# Patient Record
Sex: FEMALE | Race: WHITE | NOT HISPANIC OR LATINO | Employment: OTHER | URBAN - METROPOLITAN AREA
[De-identification: names, ages, dates, MRNs, and addresses within clinical notes are randomized per-mention and may not be internally consistent; named-entity substitution may affect disease eponyms.]

---

## 2017-05-24 PROBLEM — D49.2 NEOPLASM OF UNSPECIFIED BEHAVIOR OF BONE, SOFT TISSUE, AND SKIN: Status: RESOLVED | Noted: 2017-05-10 | Resolved: 2017-05-24

## 2017-07-25 DIAGNOSIS — Z01.812 PRE-OPERATIVE LABORATORY EXAMINATION: ICD-10-CM

## 2017-07-25 DIAGNOSIS — Z01.810 PRE-OPERATIVE CARDIOVASCULAR EXAMINATION: ICD-10-CM

## 2017-07-25 LAB
ANION GAP SERPL CALC-SCNC: 8 MMOL/L (ref 0–11.9)
APPEARANCE UR: CLEAR
BACTERIA #/AREA URNS HPF: NEGATIVE /HPF
BASOPHILS # BLD AUTO: 0.7 % (ref 0–1.8)
BASOPHILS # BLD: 0.05 K/UL (ref 0–0.12)
BILIRUB UR QL STRIP.AUTO: NEGATIVE
BUN SERPL-MCNC: 14 MG/DL (ref 8–22)
CALCIUM SERPL-MCNC: 9.5 MG/DL (ref 8.5–10.5)
CHLORIDE SERPL-SCNC: 101 MMOL/L (ref 96–112)
CO2 SERPL-SCNC: 28 MMOL/L (ref 20–33)
COLOR UR: ABNORMAL
CREAT SERPL-MCNC: 1.03 MG/DL (ref 0.5–1.4)
CULTURE IF INDICATED INDCX: YES UA CULTURE
EKG IMPRESSION: NORMAL
EOSINOPHIL # BLD AUTO: 0.1 K/UL (ref 0–0.51)
EOSINOPHIL NFR BLD: 1.4 % (ref 0–6.9)
EPI CELLS #/AREA URNS HPF: NEGATIVE /HPF
ERYTHROCYTE [DISTWIDTH] IN BLOOD BY AUTOMATED COUNT: 49.3 FL (ref 35.9–50)
EST. AVERAGE GLUCOSE BLD GHB EST-MCNC: 171 MG/DL
GFR SERPL CREATININE-BSD FRML MDRD: 52 ML/MIN/1.73 M 2
GLUCOSE SERPL-MCNC: 189 MG/DL (ref 65–99)
GLUCOSE UR STRIP.AUTO-MCNC: NEGATIVE MG/DL
HBA1C MFR BLD: 7.6 % (ref 0–5.6)
HCT VFR BLD AUTO: 43.6 % (ref 37–47)
HGB BLD-MCNC: 13.7 G/DL (ref 12–16)
HIV 1+2 AB+HIV1 P24 AG SERPL QL IA: NON REACTIVE
HYALINE CASTS #/AREA URNS LPF: NORMAL /LPF
IMM GRANULOCYTES # BLD AUTO: 0.01 K/UL (ref 0–0.11)
IMM GRANULOCYTES NFR BLD AUTO: 0.1 % (ref 0–0.9)
KETONES UR STRIP.AUTO-MCNC: NEGATIVE MG/DL
LEUKOCYTE ESTERASE UR QL STRIP.AUTO: ABNORMAL
LYMPHOCYTES # BLD AUTO: 1.32 K/UL (ref 1–4.8)
LYMPHOCYTES NFR BLD: 18.7 % (ref 22–41)
MCH RBC QN AUTO: 27.6 PG (ref 27–33)
MCHC RBC AUTO-ENTMCNC: 31.4 G/DL (ref 33.6–35)
MCV RBC AUTO: 87.7 FL (ref 81.4–97.8)
MICRO URNS: ABNORMAL
MONOCYTES # BLD AUTO: 0.46 K/UL (ref 0–0.85)
MONOCYTES NFR BLD AUTO: 6.5 % (ref 0–13.4)
NEUTROPHILS # BLD AUTO: 5.13 K/UL (ref 2–7.15)
NEUTROPHILS NFR BLD: 72.6 % (ref 44–72)
NITRITE UR QL STRIP.AUTO: NEGATIVE
NRBC # BLD AUTO: 0 K/UL
NRBC BLD AUTO-RTO: 0 /100 WBC
PH UR STRIP.AUTO: 7 [PH]
PLATELET # BLD AUTO: 309 K/UL (ref 164–446)
PMV BLD AUTO: 10.3 FL (ref 9–12.9)
POTASSIUM SERPL-SCNC: 4.3 MMOL/L (ref 3.6–5.5)
PROT UR QL STRIP: NEGATIVE MG/DL
RBC # BLD AUTO: 4.97 M/UL (ref 4.2–5.4)
RBC # URNS HPF: NORMAL /HPF
RBC UR QL AUTO: NEGATIVE
SCCMEC + MECA PNL NOSE NAA+PROBE: NEGATIVE
SCCMEC + MECA PNL NOSE NAA+PROBE: POSITIVE
SODIUM SERPL-SCNC: 137 MMOL/L (ref 135–145)
SP GR UR STRIP.AUTO: 1.01
UROBILINOGEN UR STRIP.AUTO-MCNC: 0.2 MG/DL
WBC # BLD AUTO: 7.1 K/UL (ref 4.8–10.8)
WBC #/AREA URNS HPF: NORMAL /HPF

## 2017-07-25 PROCEDURE — 83036 HEMOGLOBIN GLYCOSYLATED A1C: CPT

## 2017-07-25 PROCEDURE — 85025 COMPLETE CBC W/AUTO DIFF WBC: CPT

## 2017-07-25 PROCEDURE — 87389 HIV-1 AG W/HIV-1&-2 AB AG IA: CPT

## 2017-07-25 PROCEDURE — 87086 URINE CULTURE/COLONY COUNT: CPT

## 2017-07-25 PROCEDURE — 81001 URINALYSIS AUTO W/SCOPE: CPT

## 2017-07-25 PROCEDURE — 87640 STAPH A DNA AMP PROBE: CPT

## 2017-07-25 PROCEDURE — 87641 MR-STAPH DNA AMP PROBE: CPT

## 2017-07-25 PROCEDURE — 93005 ELECTROCARDIOGRAM TRACING: CPT

## 2017-07-25 PROCEDURE — 93010 ELECTROCARDIOGRAM REPORT: CPT | Performed by: INTERNAL MEDICINE

## 2017-07-25 PROCEDURE — 36415 COLL VENOUS BLD VENIPUNCTURE: CPT

## 2017-07-25 PROCEDURE — 80048 BASIC METABOLIC PNL TOTAL CA: CPT

## 2017-07-25 RX ORDER — LOSARTAN POTASSIUM 100 MG/1
100 TABLET ORAL DAILY
COMMUNITY

## 2017-07-25 RX ORDER — LEVOTHYROXINE SODIUM 0.03 MG/1
25 TABLET ORAL
COMMUNITY

## 2017-07-25 RX ORDER — AMLODIPINE BESYLATE 10 MG/1
10 TABLET ORAL DAILY
COMMUNITY

## 2017-07-25 RX ORDER — OXYCODONE HCL 10 MG/1
10 TABLET, FILM COATED, EXTENDED RELEASE ORAL 4 TIMES DAILY PRN
COMMUNITY

## 2017-07-25 RX ORDER — SIMVASTATIN 20 MG
20 TABLET ORAL NIGHTLY
COMMUNITY

## 2017-07-25 RX ORDER — CHLORAL HYDRATE 500 MG
1000 CAPSULE ORAL DAILY
Status: ON HOLD | COMMUNITY
End: 2017-07-27

## 2017-07-27 ENCOUNTER — APPOINTMENT (OUTPATIENT)
Dept: RADIOLOGY | Facility: MEDICAL CENTER | Age: 77
DRG: 468 | End: 2017-07-27
Attending: ORTHOPAEDIC SURGERY
Payer: MEDICARE

## 2017-07-27 ENCOUNTER — HOSPITAL ENCOUNTER (INPATIENT)
Facility: MEDICAL CENTER | Age: 77
LOS: 1 days | DRG: 468 | End: 2017-07-28
Attending: ORTHOPAEDIC SURGERY | Admitting: ORTHOPAEDIC SURGERY
Payer: MEDICARE

## 2017-07-27 PROBLEM — Z96.652 PRESENCE OF LEFT ARTIFICIAL KNEE JOINT: Status: ACTIVE | Noted: 2017-07-27

## 2017-07-27 LAB
BACTERIA UR CULT: NORMAL
GLUCOSE BLD-MCNC: 117 MG/DL (ref 65–99)
GLUCOSE BLD-MCNC: 195 MG/DL (ref 65–99)
GLUCOSE BLD-MCNC: 72 MG/DL (ref 65–99)
SIGNIFICANT IND 70042: NORMAL
SITE SITE: NORMAL
SOURCE SOURCE: NORMAL

## 2017-07-27 PROCEDURE — 82962 GLUCOSE BLOOD TEST: CPT

## 2017-07-27 PROCEDURE — 700111 HCHG RX REV CODE 636 W/ 250 OVERRIDE (IP)

## 2017-07-27 PROCEDURE — 700102 HCHG RX REV CODE 250 W/ 637 OVERRIDE(OP)

## 2017-07-27 PROCEDURE — 160036 HCHG PACU - EA ADDL 30 MINS PHASE I: Performed by: ORTHOPAEDIC SURGERY

## 2017-07-27 PROCEDURE — 700111 HCHG RX REV CODE 636 W/ 250 OVERRIDE (IP): Performed by: ORTHOPAEDIC SURGERY

## 2017-07-27 PROCEDURE — 700112 HCHG RX REV CODE 229: Performed by: ORTHOPAEDIC SURGERY

## 2017-07-27 PROCEDURE — 500002 HCHG ADHESIVE, DERMABOND: Performed by: ORTHOPAEDIC SURGERY

## 2017-07-27 PROCEDURE — 700101 HCHG RX REV CODE 250: Performed by: ORTHOPAEDIC SURGERY

## 2017-07-27 PROCEDURE — 73560 X-RAY EXAM OF KNEE 1 OR 2: CPT | Mod: LT

## 2017-07-27 PROCEDURE — 501486 HCHG STRYKER IRRIG SET HC W/TUBING: Performed by: ORTHOPAEDIC SURGERY

## 2017-07-27 PROCEDURE — A9270 NON-COVERED ITEM OR SERVICE: HCPCS | Performed by: ORTHOPAEDIC SURGERY

## 2017-07-27 PROCEDURE — A9270 NON-COVERED ITEM OR SERVICE: HCPCS

## 2017-07-27 PROCEDURE — 0QBF0ZZ EXCISION OF LEFT PATELLA, OPEN APPROACH: ICD-10-PCS | Performed by: ORTHOPAEDIC SURGERY

## 2017-07-27 PROCEDURE — 160022 HCHG BLOCK: Performed by: ORTHOPAEDIC SURGERY

## 2017-07-27 PROCEDURE — 700105 HCHG RX REV CODE 258: Performed by: ORTHOPAEDIC SURGERY

## 2017-07-27 PROCEDURE — 160002 HCHG RECOVERY MINUTES (STAT): Performed by: ORTHOPAEDIC SURGERY

## 2017-07-27 PROCEDURE — 0SPD0JZ REMOVAL OF SYNTHETIC SUBSTITUTE FROM LEFT KNEE JOINT, OPEN APPROACH: ICD-10-PCS | Performed by: ORTHOPAEDIC SURGERY

## 2017-07-27 PROCEDURE — 501838 HCHG SUTURE GENERAL: Performed by: ORTHOPAEDIC SURGERY

## 2017-07-27 PROCEDURE — 160035 HCHG PACU - 1ST 60 MINS PHASE I: Performed by: ORTHOPAEDIC SURGERY

## 2017-07-27 PROCEDURE — 500096 HCHG BONE CEMENT, SIMPLEX ANTIBIOTIC: Performed by: ORTHOPAEDIC SURGERY

## 2017-07-27 PROCEDURE — 700105 HCHG RX REV CODE 258

## 2017-07-27 PROCEDURE — 160009 HCHG ANES TIME/MIN: Performed by: ORTHOPAEDIC SURGERY

## 2017-07-27 PROCEDURE — 700102 HCHG RX REV CODE 250 W/ 637 OVERRIDE(OP): Performed by: ORTHOPAEDIC SURGERY

## 2017-07-27 PROCEDURE — 160048 HCHG OR STATISTICAL LEVEL 1-5: Performed by: ORTHOPAEDIC SURGERY

## 2017-07-27 PROCEDURE — 501480 HCHG STOCKINETTE: Performed by: ORTHOPAEDIC SURGERY

## 2017-07-27 PROCEDURE — 500088 HCHG BLADE, SAGITTAL: Performed by: ORTHOPAEDIC SURGERY

## 2017-07-27 PROCEDURE — 700101 HCHG RX REV CODE 250

## 2017-07-27 PROCEDURE — 502000 HCHG MISC OR IMPLANTS RC 0278: Performed by: ORTHOPAEDIC SURGERY

## 2017-07-27 PROCEDURE — 500054 HCHG BANDAGE, ELASTIC 6: Performed by: ORTHOPAEDIC SURGERY

## 2017-07-27 PROCEDURE — 500811 HCHG LENS/HOOD FOR SPACESUIT: Performed by: ORTHOPAEDIC SURGERY

## 2017-07-27 PROCEDURE — 0SRD0J9 REPLACEMENT OF LEFT KNEE JOINT WITH SYNTHETIC SUBSTITUTE, CEMENTED, OPEN APPROACH: ICD-10-PCS | Performed by: ORTHOPAEDIC SURGERY

## 2017-07-27 PROCEDURE — 502240 HCHG MISC OR SUPPLY RC 0272: Performed by: ORTHOPAEDIC SURGERY

## 2017-07-27 PROCEDURE — 502579 HCHG PACK, TOTAL KNEE: Performed by: ORTHOPAEDIC SURGERY

## 2017-07-27 PROCEDURE — 770006 HCHG ROOM/CARE - MED/SURG/GYN SEMI*

## 2017-07-27 PROCEDURE — 160041 HCHG SURGERY MINUTES - EA ADDL 1 MIN LEVEL 4: Performed by: ORTHOPAEDIC SURGERY

## 2017-07-27 PROCEDURE — 501487 HCHG STRYKER TIP: Performed by: ORTHOPAEDIC SURGERY

## 2017-07-27 PROCEDURE — 160029 HCHG SURGERY MINUTES - 1ST 30 MINS LEVEL 4: Performed by: ORTHOPAEDIC SURGERY

## 2017-07-27 PROCEDURE — 500093 HCHG BONE CEMENT MIXER: Performed by: ORTHOPAEDIC SURGERY

## 2017-07-27 DEVICE — IMPLANTABLE DEVICE: Type: IMPLANTABLE DEVICE | Status: FUNCTIONAL

## 2017-07-27 DEVICE — IMPLANT GII OVAL RESURFACING PAT 29MM (1EA): Type: IMPLANTABLE DEVICE | Status: FUNCTIONAL

## 2017-07-27 DEVICE — BONE CEMENT SIMPLEX ANTIBIO - (10/PK): Type: IMPLANTABLE DEVICE | Status: FUNCTIONAL

## 2017-07-27 RX ORDER — FUROSEMIDE 40 MG/1
40 TABLET ORAL
Status: DISCONTINUED | OUTPATIENT
Start: 2017-07-27 | End: 2017-07-28 | Stop reason: HOSPADM

## 2017-07-27 RX ORDER — SCOLOPAMINE TRANSDERMAL SYSTEM 1 MG/1
1 PATCH, EXTENDED RELEASE TRANSDERMAL
Status: DISCONTINUED | OUTPATIENT
Start: 2017-07-27 | End: 2017-07-28 | Stop reason: HOSPADM

## 2017-07-27 RX ORDER — OXYCODONE HYDROCHLORIDE 10 MG/1
10-15 TABLET ORAL
Status: DISCONTINUED | OUTPATIENT
Start: 2017-07-27 | End: 2017-07-27

## 2017-07-27 RX ORDER — INSULIN GLARGINE 100 [IU]/ML
58 INJECTION, SOLUTION SUBCUTANEOUS EVERY EVENING
COMMUNITY

## 2017-07-27 RX ORDER — ESOMEPRAZOLE MAGNESIUM 40 MG/1
40 CAPSULE, DELAYED RELEASE ORAL DAILY
Status: DISCONTINUED | OUTPATIENT
Start: 2017-07-27 | End: 2017-07-27

## 2017-07-27 RX ORDER — AMOXICILLIN 250 MG
1 CAPSULE ORAL NIGHTLY
Status: DISCONTINUED | OUTPATIENT
Start: 2017-07-27 | End: 2017-07-28 | Stop reason: HOSPADM

## 2017-07-27 RX ORDER — ACETAMINOPHEN 500 MG
1000 TABLET ORAL EVERY 6 HOURS
Status: DISCONTINUED | OUTPATIENT
Start: 2017-07-27 | End: 2017-07-28 | Stop reason: HOSPADM

## 2017-07-27 RX ORDER — ONDANSETRON 2 MG/ML
4 INJECTION INTRAMUSCULAR; INTRAVENOUS EVERY 4 HOURS PRN
Status: DISCONTINUED | OUTPATIENT
Start: 2017-07-27 | End: 2017-07-28 | Stop reason: HOSPADM

## 2017-07-27 RX ORDER — LOSARTAN POTASSIUM 50 MG/1
100 TABLET ORAL DAILY
Status: DISCONTINUED | OUTPATIENT
Start: 2017-07-27 | End: 2017-07-28 | Stop reason: HOSPADM

## 2017-07-27 RX ORDER — DEXAMETHASONE SODIUM PHOSPHATE 4 MG/ML
4 INJECTION, SOLUTION INTRA-ARTICULAR; INTRALESIONAL; INTRAMUSCULAR; INTRAVENOUS; SOFT TISSUE
Status: DISCONTINUED | OUTPATIENT
Start: 2017-07-27 | End: 2017-07-28 | Stop reason: HOSPADM

## 2017-07-27 RX ORDER — OXYBUTYNIN CHLORIDE 5 MG/1
5 TABLET, EXTENDED RELEASE ORAL EVERY EVENING
Status: DISCONTINUED | OUTPATIENT
Start: 2017-07-28 | End: 2017-07-28 | Stop reason: HOSPADM

## 2017-07-27 RX ORDER — GABAPENTIN 100 MG/1
100 CAPSULE ORAL 4 TIMES DAILY
Status: DISCONTINUED | OUTPATIENT
Start: 2017-07-27 | End: 2017-07-28 | Stop reason: HOSPADM

## 2017-07-27 RX ORDER — GUAIFENESIN 600 MG/1
600 TABLET, EXTENDED RELEASE ORAL 2 TIMES DAILY
Status: DISCONTINUED | OUTPATIENT
Start: 2017-07-27 | End: 2017-07-28 | Stop reason: HOSPADM

## 2017-07-27 RX ORDER — CELECOXIB 200 MG/1
200 CAPSULE ORAL DAILY
Status: DISCONTINUED | OUTPATIENT
Start: 2017-07-29 | End: 2017-07-28 | Stop reason: HOSPADM

## 2017-07-27 RX ORDER — AMOXICILLIN 250 MG
1 CAPSULE ORAL
Status: DISCONTINUED | OUTPATIENT
Start: 2017-07-27 | End: 2017-07-28 | Stop reason: HOSPADM

## 2017-07-27 RX ORDER — BISACODYL 10 MG
10 SUPPOSITORY, RECTAL RECTAL
Status: DISCONTINUED | OUTPATIENT
Start: 2017-07-27 | End: 2017-07-28 | Stop reason: HOSPADM

## 2017-07-27 RX ORDER — INSULIN GLARGINE 100 [IU]/ML
58 INJECTION, SOLUTION SUBCUTANEOUS EVERY EVENING
Status: DISCONTINUED | OUTPATIENT
Start: 2017-07-27 | End: 2017-07-28 | Stop reason: HOSPADM

## 2017-07-27 RX ORDER — OXYCODONE HCL 5 MG/5 ML
SOLUTION, ORAL ORAL
Status: COMPLETED
Start: 2017-07-27 | End: 2017-07-27

## 2017-07-27 RX ORDER — HALOPERIDOL 5 MG/ML
1 INJECTION INTRAMUSCULAR EVERY 6 HOURS PRN
Status: DISCONTINUED | OUTPATIENT
Start: 2017-07-27 | End: 2017-07-28 | Stop reason: HOSPADM

## 2017-07-27 RX ORDER — POLYETHYLENE GLYCOL 3350 17 G/17G
1 POWDER, FOR SOLUTION ORAL 2 TIMES DAILY PRN
Status: DISCONTINUED | OUTPATIENT
Start: 2017-07-27 | End: 2017-07-28 | Stop reason: HOSPADM

## 2017-07-27 RX ORDER — GABAPENTIN 100 MG/1
100 CAPSULE ORAL 4 TIMES DAILY
COMMUNITY

## 2017-07-27 RX ORDER — CEFAZOLIN SODIUM 2 G/100ML
2 INJECTION, SOLUTION INTRAVENOUS EVERY 8 HOURS
Status: COMPLETED | OUTPATIENT
Start: 2017-07-27 | End: 2017-07-28

## 2017-07-27 RX ORDER — OXYCODONE HCL 10 MG/1
TABLET, FILM COATED, EXTENDED RELEASE ORAL
Status: DISPENSED
Start: 2017-07-27 | End: 2017-07-28

## 2017-07-27 RX ORDER — SODIUM CHLORIDE 9 MG/ML
INJECTION, SOLUTION INTRAVENOUS
Status: COMPLETED
Start: 2017-07-27 | End: 2017-07-27

## 2017-07-27 RX ORDER — OXYCODONE HCL 10 MG/1
10 TABLET, FILM COATED, EXTENDED RELEASE ORAL 4 TIMES DAILY PRN
Status: DISCONTINUED | OUTPATIENT
Start: 2017-07-27 | End: 2017-07-28 | Stop reason: HOSPADM

## 2017-07-27 RX ORDER — ACETAMINOPHEN 500 MG
TABLET ORAL
Status: DISPENSED
Start: 2017-07-27 | End: 2017-07-28

## 2017-07-27 RX ORDER — SODIUM CHLORIDE 9 MG/ML
INJECTION, SOLUTION INTRAVENOUS CONTINUOUS
Status: DISCONTINUED | OUTPATIENT
Start: 2017-07-27 | End: 2017-07-28 | Stop reason: HOSPADM

## 2017-07-27 RX ORDER — TAMSULOSIN HYDROCHLORIDE 0.4 MG/1
0.4 CAPSULE ORAL
Status: DISCONTINUED | OUTPATIENT
Start: 2017-07-27 | End: 2017-07-28 | Stop reason: HOSPADM

## 2017-07-27 RX ORDER — OXYCODONE HYDROCHLORIDE 5 MG/1
5 TABLET ORAL
Status: DISCONTINUED | OUTPATIENT
Start: 2017-07-27 | End: 2017-07-28 | Stop reason: HOSPADM

## 2017-07-27 RX ORDER — ACETAMINOPHEN 650 MG
TABLET, EXTENDED RELEASE ORAL
Status: DISCONTINUED | OUTPATIENT
Start: 2017-07-27 | End: 2017-07-27 | Stop reason: HOSPADM

## 2017-07-27 RX ORDER — HYDROMORPHONE HYDROCHLORIDE 2 MG/ML
INJECTION, SOLUTION INTRAMUSCULAR; INTRAVENOUS; SUBCUTANEOUS
Status: COMPLETED
Start: 2017-07-27 | End: 2017-07-27

## 2017-07-27 RX ORDER — KETOROLAC TROMETHAMINE 30 MG/ML
15 INJECTION, SOLUTION INTRAMUSCULAR; INTRAVENOUS EVERY 8 HOURS
Status: DISCONTINUED | OUTPATIENT
Start: 2017-07-27 | End: 2017-07-28 | Stop reason: HOSPADM

## 2017-07-27 RX ORDER — GUAIFENESIN 600 MG/1
600 TABLET, EXTENDED RELEASE ORAL 2 TIMES DAILY
COMMUNITY

## 2017-07-27 RX ORDER — SIMVASTATIN 20 MG
20 TABLET ORAL NIGHTLY
Status: DISCONTINUED | OUTPATIENT
Start: 2017-07-27 | End: 2017-07-28 | Stop reason: HOSPADM

## 2017-07-27 RX ORDER — CEFAZOLIN SODIUM 2 G/100ML
2 INJECTION, SOLUTION INTRAVENOUS
Status: DISCONTINUED | OUTPATIENT
Start: 2017-07-27 | End: 2017-07-28 | Stop reason: HOSPADM

## 2017-07-27 RX ORDER — LIDOCAINE HYDROCHLORIDE 10 MG/ML
0.5 INJECTION, SOLUTION INFILTRATION; PERINEURAL
Status: COMPLETED | OUTPATIENT
Start: 2017-07-27 | End: 2017-07-27

## 2017-07-27 RX ORDER — DOCUSATE SODIUM 100 MG/1
100 CAPSULE, LIQUID FILLED ORAL 2 TIMES DAILY
Status: DISCONTINUED | OUTPATIENT
Start: 2017-07-27 | End: 2017-07-28 | Stop reason: HOSPADM

## 2017-07-27 RX ORDER — LORAZEPAM 2 MG/ML
INJECTION INTRAMUSCULAR
Status: COMPLETED
Start: 2017-07-27 | End: 2017-07-27

## 2017-07-27 RX ORDER — MAGNESIUM HYDROXIDE 1200 MG/15ML
LIQUID ORAL
Status: DISCONTINUED | OUTPATIENT
Start: 2017-07-27 | End: 2017-07-27 | Stop reason: HOSPADM

## 2017-07-27 RX ORDER — GABAPENTIN 300 MG/1
CAPSULE ORAL
Status: DISPENSED
Start: 2017-07-27 | End: 2017-07-28

## 2017-07-27 RX ORDER — EPINEPHRINE 1 MG/ML(1)
AMPUL (ML) INJECTION
Status: DISCONTINUED | OUTPATIENT
Start: 2017-07-27 | End: 2017-07-27 | Stop reason: HOSPADM

## 2017-07-27 RX ORDER — TRAMADOL HYDROCHLORIDE 50 MG/1
50 TABLET ORAL EVERY 4 HOURS PRN
Status: DISCONTINUED | OUTPATIENT
Start: 2017-07-27 | End: 2017-07-28 | Stop reason: HOSPADM

## 2017-07-27 RX ORDER — AMLODIPINE BESYLATE 10 MG/1
10 TABLET ORAL DAILY
Status: DISCONTINUED | OUTPATIENT
Start: 2017-07-27 | End: 2017-07-28 | Stop reason: HOSPADM

## 2017-07-27 RX ORDER — OXYCODONE HYDROCHLORIDE 5 MG/1
15 TABLET ORAL
Status: DISCONTINUED | OUTPATIENT
Start: 2017-07-27 | End: 2017-07-28 | Stop reason: HOSPADM

## 2017-07-27 RX ORDER — DIPHENHYDRAMINE HCL 25 MG
25 TABLET ORAL EVERY 6 HOURS PRN
Status: DISCONTINUED | OUTPATIENT
Start: 2017-07-27 | End: 2017-07-28 | Stop reason: HOSPADM

## 2017-07-27 RX ORDER — SODIUM CHLORIDE 9 MG/ML
INJECTION, SOLUTION INTRAVENOUS CONTINUOUS
Status: DISCONTINUED | OUTPATIENT
Start: 2017-07-27 | End: 2017-07-27

## 2017-07-27 RX ORDER — OXYCODONE HYDROCHLORIDE 10 MG/1
10 TABLET ORAL
Status: DISCONTINUED | OUTPATIENT
Start: 2017-07-27 | End: 2017-07-28 | Stop reason: HOSPADM

## 2017-07-27 RX ORDER — DIPHENHYDRAMINE HYDROCHLORIDE 50 MG/ML
25 INJECTION INTRAMUSCULAR; INTRAVENOUS EVERY 6 HOURS PRN
Status: DISCONTINUED | OUTPATIENT
Start: 2017-07-27 | End: 2017-07-28 | Stop reason: HOSPADM

## 2017-07-27 RX ORDER — SOLIFENACIN SUCCINATE 5 MG/1
5 TABLET, FILM COATED ORAL DAILY
Status: DISCONTINUED | OUTPATIENT
Start: 2017-07-27 | End: 2017-07-27

## 2017-07-27 RX ORDER — LEVOTHYROXINE SODIUM 0.03 MG/1
25 TABLET ORAL
Status: DISCONTINUED | OUTPATIENT
Start: 2017-07-28 | End: 2017-07-28 | Stop reason: HOSPADM

## 2017-07-27 RX ORDER — LIDOCAINE HYDROCHLORIDE 10 MG/ML
INJECTION, SOLUTION INFILTRATION; PERINEURAL
Status: COMPLETED
Start: 2017-07-27 | End: 2017-07-27

## 2017-07-27 RX ORDER — OMEPRAZOLE 20 MG/1
20 CAPSULE, DELAYED RELEASE ORAL DAILY
Status: DISCONTINUED | OUTPATIENT
Start: 2017-07-28 | End: 2017-07-28 | Stop reason: HOSPADM

## 2017-07-27 RX ORDER — ENEMA 19; 7 G/133ML; G/133ML
1 ENEMA RECTAL
Status: DISCONTINUED | OUTPATIENT
Start: 2017-07-27 | End: 2017-07-28 | Stop reason: HOSPADM

## 2017-07-27 RX ORDER — ZOLPIDEM TARTRATE 5 MG/1
10 TABLET ORAL NIGHTLY PRN
Status: DISCONTINUED | OUTPATIENT
Start: 2017-07-27 | End: 2017-07-28 | Stop reason: HOSPADM

## 2017-07-27 RX ORDER — DEXTROSE MONOHYDRATE 25 G/50ML
25 INJECTION, SOLUTION INTRAVENOUS
Status: DISCONTINUED | OUTPATIENT
Start: 2017-07-27 | End: 2017-07-28 | Stop reason: HOSPADM

## 2017-07-27 RX ORDER — LIDOCAINE AND PRILOCAINE 25; 25 MG/G; MG/G
1 CREAM TOPICAL
Status: COMPLETED | OUTPATIENT
Start: 2017-07-27 | End: 2017-07-27

## 2017-07-27 RX ADMIN — SODIUM CHLORIDE: 9 INJECTION, SOLUTION INTRAVENOUS at 14:15

## 2017-07-27 RX ADMIN — FENTANYL CITRATE 50 MCG: 50 INJECTION, SOLUTION INTRAMUSCULAR; INTRAVENOUS at 18:40

## 2017-07-27 RX ADMIN — FENTANYL CITRATE 50 MCG: 50 INJECTION, SOLUTION INTRAMUSCULAR; INTRAVENOUS at 17:45

## 2017-07-27 RX ADMIN — OXYCODONE HYDROCHLORIDE 10 MG: 5 SOLUTION ORAL at 17:45

## 2017-07-27 RX ADMIN — LIDOCAINE HYDROCHLORIDE 0.5 ML: 10 INJECTION, SOLUTION INFILTRATION; PERINEURAL at 14:30

## 2017-07-27 RX ADMIN — GUAIFENESIN 600 MG: 600 TABLET, EXTENDED RELEASE ORAL at 21:59

## 2017-07-27 RX ADMIN — AMLODIPINE BESYLATE 10 MG: 10 TABLET ORAL at 21:59

## 2017-07-27 RX ADMIN — CEFAZOLIN SODIUM 2 G: 2 INJECTION, SOLUTION INTRAVENOUS at 22:01

## 2017-07-27 RX ADMIN — SODIUM CHLORIDE 500 ML: 9 INJECTION, SOLUTION INTRAVENOUS at 19:15

## 2017-07-27 RX ADMIN — OXYCODONE HYDROCHLORIDE 10 MG: 10 TABLET ORAL at 21:55

## 2017-07-27 RX ADMIN — LORAZEPAM 0.5 MG: 2 INJECTION INTRAMUSCULAR; INTRAVENOUS at 18:45

## 2017-07-27 RX ADMIN — SODIUM CHLORIDE 1000 MG: 9 INJECTION, SOLUTION INTRAVENOUS at 18:45

## 2017-07-27 RX ADMIN — ALBUTEROL SULFATE 2.5 MG: 2.5 SOLUTION RESPIRATORY (INHALATION) at 18:30

## 2017-07-27 RX ADMIN — LOSARTAN POTASSIUM 100 MG: 50 TABLET, FILM COATED ORAL at 21:59

## 2017-07-27 RX ADMIN — FENTANYL CITRATE 50 MCG: 50 INJECTION, SOLUTION INTRAMUSCULAR; INTRAVENOUS at 17:50

## 2017-07-27 RX ADMIN — SIMVASTATIN 20 MG: 20 TABLET, FILM COATED ORAL at 21:59

## 2017-07-27 RX ADMIN — ALBUTEROL SULFATE 2.5 MG: 2.5 SOLUTION RESPIRATORY (INHALATION) at 17:45

## 2017-07-27 RX ADMIN — HYDROMORPHONE HYDROCHLORIDE 0.5 MG: 2 INJECTION INTRAMUSCULAR; INTRAVENOUS; SUBCUTANEOUS at 18:30

## 2017-07-27 RX ADMIN — TAMSULOSIN HYDROCHLORIDE 0.4 MG: 0.4 CAPSULE ORAL at 21:59

## 2017-07-27 RX ADMIN — FENTANYL CITRATE 50 MCG: 50 INJECTION, SOLUTION INTRAMUSCULAR; INTRAVENOUS at 18:00

## 2017-07-27 RX ADMIN — ACETAMINOPHEN 1000 MG: 500 TABLET ORAL at 22:00

## 2017-07-27 RX ADMIN — SODIUM CHLORIDE: 9 INJECTION, SOLUTION INTRAVENOUS at 22:01

## 2017-07-27 RX ADMIN — KETOROLAC TROMETHAMINE 15 MG: 30 INJECTION, SOLUTION INTRAMUSCULAR at 22:00

## 2017-07-27 RX ADMIN — INSULIN GLARGINE 58 UNITS: 100 INJECTION, SOLUTION SUBCUTANEOUS at 22:24

## 2017-07-27 RX ADMIN — DOCUSATE SODIUM 100 MG: 100 CAPSULE ORAL at 21:59

## 2017-07-27 RX ADMIN — GABAPENTIN 100 MG: 100 CAPSULE ORAL at 21:59

## 2017-07-27 ASSESSMENT — LIFESTYLE VARIABLES
HOW MANY TIMES IN THE PAST YEAR HAVE YOU HAD 5 OR MORE DRINKS IN A DAY: 0
AVERAGE NUMBER OF DAYS PER WEEK YOU HAVE A DRINK CONTAINING ALCOHOL: 0
TOTAL SCORE: 0
EVER FELT BAD OR GUILTY ABOUT YOUR DRINKING: NO
ON A TYPICAL DAY WHEN YOU DRINK ALCOHOL HOW MANY DRINKS DO YOU HAVE: 1
TOTAL SCORE: 0
HAVE YOU EVER FELT YOU SHOULD CUT DOWN ON YOUR DRINKING: NO
ALCOHOL_USE: YES
TOTAL SCORE: 0
EVER HAD A DRINK FIRST THING IN THE MORNING TO STEADY YOUR NERVES TO GET RID OF A HANGOVER: NO
EVER_SMOKED: NEVER
CONSUMPTION TOTAL: NEGATIVE
HAVE PEOPLE ANNOYED YOU BY CRITICIZING YOUR DRINKING: NO

## 2017-07-27 ASSESSMENT — PAIN SCALES - GENERAL
PAINLEVEL_OUTOF10: 7
PAINLEVEL_OUTOF10: 6
PAINLEVEL_OUTOF10: 10
PAINLEVEL_OUTOF10: 8
PAINLEVEL_OUTOF10: 4
PAINLEVEL_OUTOF10: 7
PAINLEVEL_OUTOF10: 10
PAINLEVEL_OUTOF10: 5
PAINLEVEL_OUTOF10: 2

## 2017-07-27 ASSESSMENT — PATIENT HEALTH QUESTIONNAIRE - PHQ9
2. FEELING DOWN, DEPRESSED, IRRITABLE, OR HOPELESS: NOT AT ALL
SUM OF ALL RESPONSES TO PHQ QUESTIONS 1-9: 0
SUM OF ALL RESPONSES TO PHQ9 QUESTIONS 1 AND 2: 0
1. LITTLE INTEREST OR PLEASURE IN DOING THINGS: NOT AT ALL

## 2017-07-27 NOTE — IP AVS SNAPSHOT
" Home Care Instructions                                                                                                                  Name:Bobbi Rousseau  Medical Record Number:[id number]  CSN: [id number]    YOB: 1940   Age: 76 y.o.  Sex: female  HT:1.753 m (5' 9\") WT: 96.8 kg (213 lb 6.5 oz)          Admit Date: 7/27/2017     Discharge Date:   Today's Date: 7/28/2017  Attending Doctor:  Isidro Skinner M.D.                  Allergies:  Review of patient's allergies indicates no active allergies.            Discharge Instructions       Discharge Instructions    Discharged to home by car with relative. Discharged via wheelchair, hospital escort: Yes.  Special equipment needed: Not Applicable    Be sure to schedule a follow-up appointment with your primary care doctor or any specialists as instructed.     Discharge Plan:     *Follow up with Dr. Skinner at scheduled appointment  *Weight bearing as tolerated                     *Activity as tolerated  *Use assistive device for all activity  *Continue exercises provided by physical therapy  *Elevate leg as needed; Ok to have pillow under the ankle NO pillow under knee  *Ice as needed (20 minutes every 1-2 hours)  *Keep dressing in place until 08/1/17 postoperative day #5  *Starting 08/1/17 remove dressing and shower. Do not soak or scrub incision, after shower pat dry and leave open to air.  *No soaking of the incision; no baths, hot tubs, or swimming until cleared by doctor  Asprin 81mg twice a day for blood clot prevention        *Take medications as prescribed by doctor  *Call doctor’s office with any questions or concerns *Follow up with Dr. Skinner at scheduled appointment    Influenza Vaccine Indication: Not indicated: Previously immunized this influenza season and > 8 years of age    I understand that a diet low in cholesterol, fat, and sodium is recommended for good health. Unless I have been given specific instructions below for another " diet, I accept this instruction as my diet prescription.   Other diet: Diabetic diet    Special Instructions: Discharge instructions for the Orthopedic Patient    Follow up with Primary Care Physician within 2 weeks of discharge to home, regarding:  Review of medications and diagnostic testing.  Surveillance for medical complications.  Workup and treatment of osteoporosis, if appropriate.     -Is this a Joint Replacement patient? Yes Total Joint Knee Replacement Discharge Instructions    Pain  - The goal is to slowly wean off the prescription pain medicine.  - Ice can be used for pain control.  20 minutes at a time is recommended, and never directly against your skin or incision.  - Most patients are off the pain pills by 3 weeks; others may require a low level of pain medications for many months.  If your pain continues to be severe, follow up with your physician.  Infection    Knee joint infections; occur in fewer than 2% of patients. The most common causes of infection following total knee replacement surgery are from bacteria that enter the bloodstream during dental procedures, urinary tract infections, or skin infections. These bacteria can lodge around your knee replacement and cause an infection.  - Keep the incision as clean and dry as possible.  - Always wash your hands before touching your incision.  - Skin infections tend to develop around 7-10 days after surgery; most can be treated with oral antibiotics.  - Dental Care should be delayed for 3 months after surgery, your surgeon recommends taking a dose of antibiotics 1 hour prior to any dental procedure. After 2 years, most surgeons recommend antibiotics only before an extensive procedure.  Ask your surgeon what he recommends.  - Signs and symptoms of infection can include:  low grade fever, redness, pain, swelling and drainage from your incision.  Notify your surgeon immediately if you develop any of these symptoms.  Other instructions  - Bowel habits -  constipation is extremely common and is caused by a combination of anesthesia, lack of mobility and pain medicine.  Use stool softeners or laxatives if necessary. It is important not to ignore this problem, as bowel obstructions can be a serious complication after joint replacement surgery.  - Mood/Energy Level - Many patients experience a lack of energy and endurance for up to 2-3 months after surgery.  Some may also feel down and can even become depressed.  This is likely due to the postoperative anemia, change in activity level, lack of sleep, pain medicine and just the emotional reaction to the surgery itself that is a big disruption in a person’s life.  This usually passes.  If symptoms persist, follow up with your primary physician.  - Returning to work - Your surgeon will give you more specific instructions. Depending on the type of activities you perform, it may be 6 to 8 weeks before you return to work.   Generally, if you work a sedentary job requiring little standing or walking, most patients may return within 2-6 weeks.  Manual labor jobs involving walking, lifting and standing may take longer. Your surgeon’s office can provide a release to part-time or light duty work early on in your recovery and progress you to full duty as able.    - Driving - If your left knee was replaced and you have an automatic transmission, you may be able to begin driving in a week or so, provided you are no longer taking narcotic pain medication. If your right knee was replaced, avoid driving for 6 to 8 weeks. Remember that your reflexes may not be as sharp as before your surgery. Ask your surgeon for specific instructions.   - Avoiding falls - A fall during the first few weeks after surgery can damage your new knee and may result in a need for further surgery.   throw rugs and tack down loose carpeting.  Be aware of floor hazards such as pets, small objects or uneven surfaces.    - [employer] Metal Detectors - The  sensitivity of metal detectors varies and it is likely that your prosthesis will cause an alarm.  Inform the  of your artificial joint.  Diet  - Resume your normal diet as tolerated.  - It is important to achieve a healthy nutritional status by eating a well balanced diet on a regular basis.  - Your physician may recommend that you take iron and vitamin supplements.   - Continue to drink plenty of fluids.  Shower/Bathing  - You may shower as soon as you get home from the hospital unless otherwise instructed.  - Keep your incision out of water.  To keep the incision dry when showering, cover it with a plastic bag or plastic wrap.  - Pat incision dry if it gets wet.  Don’t rub.  - Do not submerge in a bath until staples are out and the incision is completely healed. (Approximately 6-8 weeks)  Dressing Change:  Procedure (if recommended by your physician)  - Wash hands.  - Open all new dressing change materials.  - Remove old dressing and discard.  - Inspect incision for redness, increase in clear drainage, yellow/green drainage, odor and surrounding skin hot to touch.  -  ABD (large gauze) pad or “island dressing” by one corner and lay over the incision.  Be careful not to touch the inside of the dressing that will lay over the incision.  - Secure in place as instructed (Ace wrap or tape).    Swelling/Bruising    - Swelling can last from 3-6 months.  - Elevate your leg higher than your heart while reclining.   The first week you are home you should elevate your leg an equal amount of time, as you are active.    - Anti-inflammatory pills can be taken once you have stopped the blood thinners.  - The swelling is usually worse after you go home since you are upright for longer periods of time.  - Bruising is common and can involve the entire leg including the thigh, calf and even foot. Bruising often does not appear until after you arrive home and it can be quite dramatic- purple, black, and green.   The bruising you can see is not usually concerning and will subside without any treatment.      Blood Clot Prevention  Blood clots in the legs and the less common, but frightening, clots that travel to the lungs are a real focus of our preventative. Most patients are at standard risk for them, but those patients who are at higher risk include people who have had previous clots, a family history of clotting, smoking, diabetes, obesity, advanced age, use of estrogen and a sedentary lifestyle.    - Signs of blood clots in legs - Swelling in thigh, calf or ankle that does not go down with elevation.  Pain, heat and tenderness in calf, back of calf or groin area.  NOTE: blood clots can occur in either leg.  - You have been receiving anticoagulant therapy (blood thinners) in the hospital and you may be instructed to continue at home depending on your risk factors.  - Your risk for developing a clot continues for up to 2-3 months after surgery.  You should avoid prolonged sitting and dehydration during that time (long air trips and car trips).  If you do take a trip during this time, please get up and move around every 1- 1.5 hours.  - If you are prescribed blood-thinning medication for home, follow instructions as directed. (Handouts provided if applicable).      Activity  Once home, you should continue to stay active. The key is to remember not to overdo it! While you can expect some good days and some bad days, you should notice a gradual improvement and a gradual increase in your endurance over the next 6 to 12 months. Exercise is a critical component of home care, particularly during the first few weeks after surgery.     - Normal activities of daily living You should be able to resume most within 3 to 6 weeks following surgery. Some pain with activity and at night is common for several weeks after surgery  -  Physical Therapy Exercises - Continue to do the exercises prescribed for at least two months after surgery.  Riding a stationary bicycle can help maintain muscle tone and keep your knee flexible. Try to achieve the maximum degree of bending and extension possible. (handout provided by Therapist).  - Sexual Activity -. Your surgeon can tell you when it safe to resume sexual activity.    - Sleeping Positions - You can safely sleep on your back, on either side, or on your stomach.   - Other Activities - Walk as much as you like, but remember that walking is no substitute for the exercises your doctor and physical therapist will prescribe. Lower impact activities are preferred.  If you have specific questions, consult your Surgeon.    When to Call the Doctor   Call the physician if:   - Fever over 100.5? F  - Increased pain, drainage, redness, odor or heat around the incision area  - Shaking chills  - Increased knee pain with activity and rest  - Increased pain in calf, tenderness or redness above or below the knee  - Increased swelling of calf, ankle, foot  - Sudden increased shortness of breath, sudden onset of chest pain, localized chest pain with coughing  - Incision opening  Or, if there are any questions or concerns about medications or care.       -Is this patient being discharged with medication to prevent blood clots?  Yes, Aspirin Aspirin, ASA oral tablets  What is this medicine?  ASPIRIN (AS pir in) is a pain reliever. It is used to treat mild pain and fever. This medicine is also used as directed by a doctor to prevent and to treat heart attacks, to prevent strokes, and to treat arthritis or inflammation.  This medicine may be used for other purposes; ask your health care provider or pharmacist if you have questions.  COMMON BRAND NAME(S): Aspir-Low, Aspir-Brittanie , Aspirtab , Juan Advanced Aspirin, Juan Aspirin Extra Strength, SplitSecnd Aspirin Plus, Juan Aspirin, Juan Genuine Aspirin, Juan Womens Aspirin , Bufferin Extra Strength, Bufferin Low Dose, Bufferin  What should I tell my health care provider before I take  this medicine?  They need to know if you have any of these conditions:  -anemia  -asthma  -bleeding problems  -child with chickenpox, the flu, or other viral infection  -diabetes  -gout  -if you frequently drink alcohol containing drinks  -kidney disease  -liver disease  -low level of vitamin K  -lupus  -smoke tobacco  -stomach ulcers or other problems  -an unusual or allergic reaction to aspirin, tartrazine dye, other medicines, dyes, or preservatives  -pregnant or trying to get pregnant  -breast-feeding  How should I use this medicine?  Take this medicine by mouth with a glass of water. Follow the directions on the package or prescription label. You can take this medicine with or without food. If it upsets your stomach, take it with food. Do not take your medicine more often than directed.  Talk to your pediatrician regarding the use of this medicine in children. While this drug may be prescribed for children as young as 12 years of age for selected conditions, precautions do apply. Children and teenagers should not use this medicine to treat chicken pox or flu symptoms unless directed by a doctor.  Patients over 65 years old may have a stronger reaction and need a smaller dose.  Overdosage: If you think you have taken too much of this medicine contact a poison control center or emergency room at once.  NOTE: This medicine is only for you. Do not share this medicine with others.  What if I miss a dose?  If you are taking this medicine on a regular schedule and miss a dose, take it as soon as you can. If it is almost time for your next dose, take only that dose. Do not take double or extra doses.  What may interact with this medicine?  Do not take this medicine with any of the following medications:  -cidofovir  -ketorolac  -probenecid  This medicine may also interact with the following medications:  -alcohol  -alendronate  -bismuth subsalicylate  -flavocoxid  -herbal supplements like feverfew, garlic, luciana,  ginkgo biloba, horse chestnut  -medicines for diabetes or glaucoma like acetazolamide, methazolamide  -medicines for gout  -medicines that treat or prevent blood clots like enoxaparin, heparin, ticlopidine, warfarin  -other aspirin and aspirin-like medicines  -NSAIDs, medicines for pain and inflammation, like ibuprofen or naproxen  -pemetrexed  -sulfinpyrazone  -varicella live vaccine  This list may not describe all possible interactions. Give your health care provider a list of all the medicines, herbs, non-prescription drugs, or dietary supplements you use. Also tell them if you smoke, drink alcohol, or use illegal drugs. Some items may interact with your medicine.  What should I watch for while using this medicine?  If you are treating yourself for pain, tell your doctor or health care professional if the pain lasts more than 10 days, if it gets worse, or if there is a new or different kind of pain. Tell your doctor if you see redness or swelling. Also, check with your doctor if you have a fever that lasts for more than 3 days. Only take this medicine to prevent heart attacks or blood clotting if prescribed by your doctor or health care professional.  Do not take aspirin or aspirin-like medicines with this medicine. Too much aspirin can be dangerous. Always read the labels carefully.  This medicine can irritate your stomach or cause bleeding problems. Do not smoke cigarettes or drink alcohol while taking this medicine. Do not lie down for 30 minutes after taking this medicine to prevent irritation to your throat.  If you are scheduled for any medical or dental procedure, tell your healthcare provider that you are taking this medicine. You may need to stop taking this medicine before the procedure.  What side effects may I notice from receiving this medicine?  Side effects that you should report to your doctor or health care professional as soon as possible:  -allergic reactions like skin rash, itching or hives,  swelling of the face, lips, or tongue  -black, tarry stools  -bloody, coffee ground-like vomit  -breathing problems  -changes in hearing, ringing in the ears  -confusion  -general ill feeling or flu-like symptoms  -pain on swallowing  -redness, blistering, peeling or loosening of the skin, including inside the mouth or nose  -trouble passing urine or change in the amount of urine  -unusual bleeding or bruising  -unusually weak or tired  -yellowing of the eyes or skin  Side effects that usually do not require medical attention (report to your doctor or health care professional if they continue or are bothersome):  -diarrhea or constipation  -nausea, vomiting  -stomach gas, heartburn  This list may not describe all possible side effects. Call your doctor for medical advice about side effects. You may report side effects to FDA at 6-633-FDA-7970.  Where should I keep my medicine?  Keep out of the reach of children.  Store at room temperature between 15 and 30 degrees C (59 and 86 degrees F). Protect from heat and moisture. Do not use this medicine if it has a strong vinegar smell. Throw away any unused medicine after the expiration date.  NOTE: This sheet is a summary. It may not cover all possible information. If you have questions about this medicine, talk to your doctor, pharmacist, or health care provider.  © 2014, [employer]. (3/10/2009 10:44:17 AM)      · Is patient discharged on Warfarin / Coumadin?   No     · Is patient Post Blood Transfusion?  No    Incision Care  An incision is when a surgeon cuts into your body. After surgery, the incision needs to be cared for properly to prevent infection.   HOW TO CARE FOR YOUR INCISION  · Take medicines only as directed by your health care provider.  · There are many different ways to close and cover an incision, including stitches, skin glue, and adhesive strips. Follow your health care provider's instructions on:  ¨ Incision care.  ¨ Bandage (dressing)  changes and removal.  ¨ Incision closure removal.  · Do not take baths, swim, or use a hot tub until your health care provider approves. You may shower as directed by your health care provider.  · Resume your normal diet and activities as directed.  · Use anti-itch medicine (such as an antihistamine) as directed by your health care provider. The incision may itch while it is healing. Do not pick or scratch at the incision.  · Drink enough fluid to keep your urine clear or pale yellow.  SEEK MEDICAL CARE IF:   · You have drainage, redness, swelling, or pain at your incision site.  · You have muscle aches, chills, or a general ill feeling.  · You notice a bad smell coming from the incision or dressing.  · Your incision edges separate after the sutures, staples, or skin adhesive strips have been removed.  · You have persistent nausea or vomiting.  · You have a fever.  · You are dizzy.  SEEK IMMEDIATE MEDICAL CARE IF:   · You have a rash.  · You faint.  · You have difficulty breathing.  MAKE SURE YOU:   · Understand these instructions.  · Will watch your condition.  · Will get help right away if you are not doing well or get worse.     This information is not intended to replace advice given to you by your health care provider. Make sure you discuss any questions you have with your health care provider.     Document Released: 07/07/2006 Document Revised: 01/08/2016 Document Reviewed: 02/11/2015  [employer] Interactive Patient Education ©2016 [employer]    Depression / Suicide Risk    As you are discharged from this [facility] facility, it is important to learn how to keep safe from harming yourself.    Recognize the warning signs:  · Abrupt changes in personality, positive or negative- including increase in energy   · Giving away possessions  · Change in eating patterns- significant weight changes-  positive or negative  · Change in sleeping patterns- unable to sleep or sleeping all the time   · Unwillingness or  inability to communicate  · Depression  · Unusual sadness, discouragement and loneliness  · Talk of wanting to die  · Neglect of personal appearance   · Rebelliousness- reckless behavior  · Withdrawal from people/activities they love  · Confusion- inability to concentrate     If you or a loved one observes any of these behaviors or has concerns about self-harm, here's what you can do:  · Talk about it- your feelings and reasons for harming yourself  · Remove any means that you might use to hurt yourself (examples: pills, rope, extension cords, firearm)  · Get professional help from the community (Mental Health, Substance Abuse, psychological counseling)  · Do not be alone:Call your Safe Contact- someone whom you trust who will be there for you.  · Call your local CRISIS HOTLINE 338-8980 or 711-371-7063  · Call your local Children's Mobile Crisis Response Team [place] (941) 741-7362 or www.[employer]and  · Call the toll free National Suicide Prevention Hotlines   · National Suicide Prevention Lifeline 469-108-MJUZ (6052)  · [employer] Line Network 800-SUICIDE (485-7680)        Follow-up Information     1. Follow up with Isidro Skinner M.D.. Schedule an appointment as soon as possible for a visit in 2 weeks.    Specialty:  Orthopaedics    Why:  Follow up    Contact information    [address]  [address]  [place] [address]  [phone]           Discharge Medication Instructions:    Below are the medications your physician expects you to take upon discharge:    Review all your home medications and newly ordered medications with your doctor and/or pharmacist. Follow medication instructions as directed by your doctor and/or pharmacist.    Please keep your medication list with you and share with your physician.               Medication List      CHANGE how you take these medications        Instructions    Morning Afternoon Evening Bedtime    aspirin EC 81 MG Tbec   What changed:    - how much to take  - when  to take this   Last time this was given:  81 mg on 7/28/2017  5:49 AM   Commonly known as:  ECOTRIN        Take 1 Tab by mouth 2 Times a Day.   Dose:  81 mg                          CONTINUE taking these medications        Instructions    Morning Afternoon Evening Bedtime    oxyCODONE CR 10 MG T12a   Last time this was given:  10 mg on 7/28/2017  8:22 AM   Commonly known as:  OXYCONTIN        Take 10 mg by mouth 4 times a day as needed.   Dose:  10 mg                          ASK your doctor about these medications        Instructions    Morning Afternoon Evening Bedtime    amlodipine 10 MG Tabs   Last time this was given:  10 mg on 7/27/2017  9:59 PM   Commonly known as:  NORVASC        Take 10 mg by mouth every day.   Dose:  10 mg                        CALCIUM PO        Take 1 Tab by mouth every day.   Dose:  1 Tab                        furosemide 40 MG Tabs   Commonly known as:  LASIX        Take 40 mg by mouth 1 time daily as needed.   Dose:  40 mg                        gabapentin 100 MG Caps   Last time this was given:  100 mg on 7/28/2017  8:23 AM   Commonly known as:  NEURONTIN        Take 100 mg by mouth 4 times a day.   Dose:  100 mg                        guaifenesin  MG Tb12   Last time this was given:  600 mg on 7/28/2017  8:23 AM   Commonly known as:  MUCINEX        Take 600 mg by mouth 2 Times a Day.   Dose:  600 mg                        insulin glargine 100 UNIT/ML Soln   Last time this was given:  58 Units on 7/27/2017 10:24 PM   Commonly known as:  LANTUS        Inject 58 Units as instructed every evening.   Dose:  58 Units                        losartan 100 MG Tabs   Last time this was given:  100 mg on 7/27/2017  9:59 PM   Commonly known as:  COZAAR        Take 100 mg by mouth every day.   Dose:  100 mg                        metformin 1000 MG tablet   Last time this was given:  1,000 mg on 7/28/2017  5:49 AM   Commonly known as:  GLUCOPHAGE        Take 1,000 mg by mouth 2 times daily,  before breakfast and dinner.   Dose:  1000 mg                        MULTIPLE VITAMIN PO        Take 1 Tab by mouth every day.   Dose:  1 Tab                        NEXIUM 40 MG delayed-release capsule   Generic drug:  esomeprazole        Take 40 mg by mouth every day.   Dose:  40 mg                        NOVOLIN R 100 Unit/mL Soln   Last time this was given:  12 Units on 7/28/2017  6:07 AM   Generic drug:  insulin regular        Inject 12 Units as instructed every day. morning   Dose:  12 Units                        SYNTHROID 25 MCG Tabs   Last time this was given:  25 mcg on 7/28/2017  5:49 AM   Generic drug:  levothyroxine        Take 25 mcg by mouth Every morning on an empty stomach.   Dose:  25 mcg                        VESICARE 5 MG tablet   Generic drug:  solifenacin        Take 5 mg by mouth every day.   Dose:  5 mg                        ZOCOR 20 MG Tabs   Last time this was given:  20 mg on 7/27/2017  9:59 PM   Generic drug:  simvastatin        Take 20 mg by mouth every evening.   Dose:  20 mg                        zolpidem 12.5 MG CR tablet   Commonly known as:  AMBIEN CR        Take 12.5 mg by mouth at bedtime as needed.   Dose:  12.5 mg                             Where to Get Your Medications      Information about where to get these medications is not yet available     ! Ask your nurse or doctor about these medications    - aspirin EC 81 MG Tbec            Instructions           Diet / Nutrition:    Follow any diet instructions given to you by your doctor or the dietician, including how much salt (sodium) you are allowed each day.    If you are overweight, talk to your doctor about a weight reduction plan.    Activity:    Remain physically active following your doctor's instructions about exercise and activity.    Rest often.     Any time you become even a little tired or short of breath, SIT DOWN and rest.    Worsening Symptoms:    Report any of the following signs and symptoms to the doctor's  office immediately:    *Pain of jaw, arm, or neck  *Chest pain not relieved by medication                               *Dizziness or loss of consciousness  *Difficulty breathing even when at rest   *More tired than usual                                       *Bleeding drainage or swelling of surgical site  *Swelling of feet, ankles, legs or stomach                 *Fever (>100ºF)  *Pink or blood tinged sputum  *Weight gain (3lbs/day or 5lbs /week)           *Shock from internal defibrillator (if applicable)  *Palpitations or irregular heartbeats                *Cool and/or numb extremities    Stroke Awareness    Common Risk Factors for Stroke include:    Age  Atrial Fibrillation  Carotid Artery Stenosis  Diabetes Mellitus  Excessive alcohol consumption  High blood pressure  Overweight   Physical inactivity  Smoking    Warning signs and symptoms of a stroke include:    *Sudden numbness or weakness of the face, arm or leg (especially on one side of the body).  *Sudden confusion, trouble speaking or understanding.  *Sudden trouble seeing in one or both eyes.  *Sudden trouble walking, dizziness, loss of balance or coordination.Sudden severe headache with no known cause.    It is very important to get treatment quickly when a stroke occurs. If you experience any of the above warning signs, call 911 immediately.                   Disclaimer         Quit Smoking / Tobacco Use:    I understand the use of any tobacco products increases my chance of suffering from future heart disease or stroke and could cause other illnesses which may shorten my life. Quitting the use of tobacco products is the single most important thing I can do to improve my health. For further information on smoking / tobacco cessation call a Toll Free Quit Line at 1-590.695.2337 (*[employer] [place]) or 1-856.644.6935 (American Lung Association) or you can access the web based program at www.lungusa.org.    [place] Tobacco Users Help Line:  (399)  872-5674       Toll Free: 0-198-938-1771  Quit Tobacco Program [facility] ([phone]    Crisis Hotline:    [place] Crisis Hotline:  3-399-LXXVAVL or 1-214.252.2354    [place] Crisis Hotline:    1-733.347.9217 or 640-127-6549    Discharge Survey:   Thank you for choosing [facility]. We hope we did everything we could to make your hospital stay a pleasant one. You may be receiving a phone survey and we would appreciate your time and participation in answering the questions. Your input is very valuable to us in our efforts to improve our service to our patients and their families.        My signature on this form indicates that:    1. I have reviewed and understand the above information.  2. My questions regarding this information have been answered to my satisfaction.  3. I have formulated a plan with my discharge nurse to obtain my prescribed medications for home.                  Disclaimer         __________________________________                     __________       ________                       Patient Signature                                                 Date                    Time

## 2017-07-27 NOTE — OR NURSING
Upon arrival room air sats were 84%.  Pt states she uses home oxygen at night only at 3L.  Pt placed on oxygen at 3L with sats now 92%  Pt states she uses [facility] for her home oxygen needs.

## 2017-07-27 NOTE — IP AVS SNAPSHOT
7/28/2017    Bobbi Otto [address] [place] [address]    Dear Bobbi:    [facility] wants to ensure your discharge home is safe and you or your loved ones have had all of your questions answered regarding your care after you leave the hospital.    Below is a list of resources and contact information should you have any questions regarding your hospital stay, follow-up instructions, or active medical symptoms.    Questions or Concerns Regarding… Contact   Medical Questions Related to Your Discharge  (7 days a week, 8am-5pm) Contact a Nurse Care Coordinator   [phone]   Medical Questions Not Related to Your Discharge  (24 hours a day / 7 days a week)  Contact the Nurse Health Line   [phone]    Medications or Discharge Instructions Refer to your discharge packet   or contact your [facility] Primary Care Provider   [phone]   Follow-up Appointment(s) Schedule your appointment via Glooko   or contact Scheduling [phone]   Billing Review your statement via Glooko  or contact Billing [phone]   Medical Records Review your records via Glooko   or contact Medical Records [phone]     You may receive a telephone call within two days of discharge. This call is to make certain you understand your discharge instructions and have the opportunity to have any questions answered. You can also easily access your medical information, test results and upcoming appointments via the Glooko free online health management tool. You can learn more and sign up at Exit41/Glooko. For assistance setting up your Glooko account, please call [phone].    Once again, we want to ensure your discharge home is safe and that you have a clear understanding of any next steps in your care. If you have any questions or concerns, please do not hesitate to contact us, we are here for you. Thank you for choosing [facility] for your healthcare needs.    Sincerely,    Your [facility]  Team

## 2017-07-27 NOTE — IP AVS SNAPSHOT
AlphaBoost Access Code: [email or web address]  Expires: 8/27/2017 12:19 PM    Your email address is not on file at [facility].  Email Addresses are required for you to sign up for AlphaBoost, please contact [phone] to verify your personal information and to provide your email address prior to attempting to register for AlphaBoost.    Bobbi Otto [address] [place] [address]    AlphaBoost  A secure, online tool to manage your health information     [facility]’s AlphaBoost® is a secure, online tool that connects you to your personalized health information from the privacy of your home -- day or night - making it very easy for you to manage your healthcare. Once the activation process is completed, you can even access your medical information using the AlphaBoost makenzie, which is available for free in the Apple Makenzie store or Google Play store.     To learn more about AlphaBoost, visit [email or web address]    There are two levels of access available (as shown below):   My Chart Features  [facility] Primary Care Doctor [facility]  Specialists [facility]  Urgent  Care Non-[facility] Primary Care Doctor   Email your healthcare team securely and privately 24/7 X X X    Manage appointments: schedule your next appointment; view details of past/upcoming appointments X      Request prescription refills. X      View recent personal medical records, including lab and immunizations X X X X   View health record, including health history, allergies, medications X X X X   Read reports about your outpatient visits, procedures, consult and ER notes X X X X   See your discharge summary, which is a recap of your hospital and/or ER visit that includes your diagnosis, lab results, and care plan X X  X     How to register for [employer]:  Once your e-mail address has been verified, follow the following steps to sign up for [employer].     1. Go to  [email or web address]  2. Click on the Sign Up Now box, which takes you to the New Member Sign Up  page. You will need to provide the following information:  a. Enter your Transbiomed Access Code exactly as it appears at the top of this page. (You will not need to use this code after you’ve completed the sign-up process. If you do not sign up before the expiration date, you must request a new code.)   b. Enter your date of birth.   c. Enter your home email address.   d. Click Submit, and follow the next screen’s instructions.  3. Create a Transbiomed ID. This will be your Transbiomed login ID and cannot be changed, so think of one that is secure and easy to remember.  4. Create a Transbiomed password. You can change your password at any time.  5. Enter your Password Reset Question and Answer. This can be used at a later time if you forget your password.   6. Enter your e-mail address. This allows you to receive e-mail notifications when new information is available in Transbiomed.  7. Click Sign Up. You can now view your health information.    For assistance activating your Transbiomed account, call [phone]

## 2017-07-27 NOTE — ED NOTES
The Medication Reconciliation process has been completed by interviewing the patient    Allergies have been reviewed  Antibiotic use in 30 days - none    [facility] Pharm[facility]:  [facility]

## 2017-07-27 NOTE — IP AVS SNAPSHOT
" <p align=\"LEFT\"><IMG SRC=\"//[email or web address]\" alt=\"Image\" WIDTH=\"50%\" HEIGHT=\"200\" BORDER=\"\"></p>                   Name:Bobbi Rousseau  Medical Record Number:[id number]  CSN: [id number]    YOB: 1940   Age: 76 y.o.  Sex: female  HT:1.753 m (5' 9\") WT: 96.8 kg (213 lb 6.5 oz)          Admit Date: 7/27/2017     Discharge Date:   Today's Date: 7/28/2017  Attending Doctor:  Isidro Skinner M.D.                  Allergies:  Review of patient's allergies indicates no active allergies.          Follow-up Information     1. Follow up with Isidro Skinner M.D.. Schedule an appointment as soon as possible for a visit in 2 weeks.    Specialty:  Orthopaedics    Why:  Follow up    Contact information    [address]  [address]  [facility] [address]  [phone]           Medication List      Take these Medications        Instructions    aspirin EC 81 MG Tbec   What changed:    - how much to take  - when to take this   Commonly known as:  ECOTRIN    Take 1 Tab by mouth 2 Times a Day.   Dose:  81 mg       oxyCODONE CR 10 MG T12a   Commonly known as:  OXYCONTIN    Take 10 mg by mouth 4 times a day as needed.   Dose:  10 mg         Ask your Physician about these medications        Instructions    amlodipine 10 MG Tabs   Commonly known as:  NORVASC    Take 10 mg by mouth every day.   Dose:  10 mg       CALCIUM PO    Take 1 Tab by mouth every day.   Dose:  1 Tab       furosemide 40 MG Tabs   Commonly known as:  LASIX    Take 40 mg by mouth 1 time daily as needed.   Dose:  40 mg       gabapentin 100 MG Caps   Commonly known as:  NEURONTIN    Take 100 mg by mouth 4 times a day.   Dose:  100 mg       guaifenesin  MG Tb12   Commonly known as:  MUCINEX    Take 600 mg by mouth 2 Times a Day.   Dose:  600 mg       insulin glargine 100 UNIT/ML Soln   Commonly known as:  LANTUS    Inject 58 Units as instructed every evening.   Dose:  58 Units       losartan 100 MG Tabs   Commonly known as:  COZAAR    Take " 100 mg by mouth every day.   Dose:  100 mg       metformin 1000 MG tablet   Commonly known as:  GLUCOPHAGE    Take 1,000 mg by mouth 2 times daily, before breakfast and dinner.   Dose:  1000 mg       MULTIPLE VITAMIN PO    Take 1 Tab by mouth every day.   Dose:  1 Tab       NEXIUM 40 MG delayed-release capsule   Generic drug:  esomeprazole    Take 40 mg by mouth every day.   Dose:  40 mg       NOVOLIN R 100 Unit/mL Soln   Generic drug:  insulin regular    Inject 12 Units as instructed every day. morning   Dose:  12 Units       SYNTHROID 25 MCG Tabs   Generic drug:  levothyroxine    Take 25 mcg by mouth Every morning on an empty stomach.   Dose:  25 mcg       VESICARE 5 MG tablet   Generic drug:  solifenacin    Take 5 mg by mouth every day.   Dose:  5 mg       ZOCOR 20 MG Tabs   Generic drug:  simvastatin    Take 20 mg by mouth every evening.   Dose:  20 mg       zolpidem 12.5 MG CR tablet   Commonly known as:  AMBIEN CR    Take 12.5 mg by mouth at bedtime as needed.   Dose:  12.5 mg

## 2017-07-28 VITALS
WEIGHT: 213.41 LBS | OXYGEN SATURATION: 91 % | RESPIRATION RATE: 16 BRPM | BODY MASS INDEX: 31.61 KG/M2 | HEIGHT: 69 IN | TEMPERATURE: 97.6 F | HEART RATE: 81 BPM | SYSTOLIC BLOOD PRESSURE: 105 MMHG | DIASTOLIC BLOOD PRESSURE: 65 MMHG

## 2017-07-28 LAB
GLUCOSE BLD-MCNC: 193 MG/DL (ref 65–99)
GLUCOSE BLD-MCNC: 287 MG/DL (ref 65–99)
HCT VFR BLD AUTO: 38.2 % (ref 37–47)
HGB BLD-MCNC: 11.8 G/DL (ref 12–16)

## 2017-07-28 PROCEDURE — G8989 SELF CARE D/C STATUS: HCPCS | Mod: CI

## 2017-07-28 PROCEDURE — 82962 GLUCOSE BLOOD TEST: CPT | Mod: 91

## 2017-07-28 PROCEDURE — 306481 GARMENT,FOOT IMPAD VASO: Performed by: ORTHOPAEDIC SURGERY

## 2017-07-28 PROCEDURE — 700102 HCHG RX REV CODE 250 W/ 637 OVERRIDE(OP): Performed by: ORTHOPAEDIC SURGERY

## 2017-07-28 PROCEDURE — 85018 HEMOGLOBIN: CPT

## 2017-07-28 PROCEDURE — 700102 HCHG RX REV CODE 250 W/ 637 OVERRIDE(OP)

## 2017-07-28 PROCEDURE — 700112 HCHG RX REV CODE 229: Performed by: ORTHOPAEDIC SURGERY

## 2017-07-28 PROCEDURE — G8978 MOBILITY CURRENT STATUS: HCPCS | Mod: CI

## 2017-07-28 PROCEDURE — 97161 PT EVAL LOW COMPLEX 20 MIN: CPT

## 2017-07-28 PROCEDURE — G8988 SELF CARE GOAL STATUS: HCPCS | Mod: CI

## 2017-07-28 PROCEDURE — 36415 COLL VENOUS BLD VENIPUNCTURE: CPT

## 2017-07-28 PROCEDURE — A9270 NON-COVERED ITEM OR SERVICE: HCPCS | Performed by: ORTHOPAEDIC SURGERY

## 2017-07-28 PROCEDURE — G8987 SELF CARE CURRENT STATUS: HCPCS | Mod: CI

## 2017-07-28 PROCEDURE — A9270 NON-COVERED ITEM OR SERVICE: HCPCS

## 2017-07-28 PROCEDURE — G8979 MOBILITY GOAL STATUS: HCPCS | Mod: CI

## 2017-07-28 PROCEDURE — 700111 HCHG RX REV CODE 636 W/ 250 OVERRIDE (IP): Performed by: ORTHOPAEDIC SURGERY

## 2017-07-28 PROCEDURE — 97165 OT EVAL LOW COMPLEX 30 MIN: CPT

## 2017-07-28 PROCEDURE — 85014 HEMATOCRIT: CPT

## 2017-07-28 RX ADMIN — INSULIN LISPRO 5 UNITS: 100 INJECTION, SOLUTION INTRAVENOUS; SUBCUTANEOUS at 06:05

## 2017-07-28 RX ADMIN — OXYCODONE HYDROCHLORIDE 10 MG: 10 TABLET ORAL at 05:48

## 2017-07-28 RX ADMIN — DOCUSATE SODIUM 100 MG: 100 CAPSULE ORAL at 08:23

## 2017-07-28 RX ADMIN — GABAPENTIN 100 MG: 100 CAPSULE ORAL at 08:23

## 2017-07-28 RX ADMIN — METFORMIN HYDROCHLORIDE 1000 MG: 500 TABLET, FILM COATED ORAL at 05:49

## 2017-07-28 RX ADMIN — OMEPRAZOLE 20 MG: 20 CAPSULE, DELAYED RELEASE ORAL at 08:22

## 2017-07-28 RX ADMIN — ASPIRIN 81 MG: 81 TABLET ORAL at 05:49

## 2017-07-28 RX ADMIN — KETOROLAC TROMETHAMINE 15 MG: 30 INJECTION, SOLUTION INTRAMUSCULAR at 05:49

## 2017-07-28 RX ADMIN — OXYCODONE HYDROCHLORIDE 10 MG: 10 TABLET ORAL at 02:38

## 2017-07-28 RX ADMIN — LEVOTHYROXINE SODIUM 25 MCG: 25 TABLET ORAL at 05:49

## 2017-07-28 RX ADMIN — INSULIN HUMAN 12 UNITS: 100 INJECTION, SOLUTION PARENTERAL at 06:07

## 2017-07-28 RX ADMIN — GUAIFENESIN 600 MG: 600 TABLET, EXTENDED RELEASE ORAL at 08:23

## 2017-07-28 RX ADMIN — CEFAZOLIN SODIUM 2 G: 2 INJECTION, SOLUTION INTRAVENOUS at 05:51

## 2017-07-28 RX ADMIN — ACETAMINOPHEN 1000 MG: 500 TABLET ORAL at 05:48

## 2017-07-28 RX ADMIN — OXYCODONE HYDROCHLORIDE 10 MG: 10 TABLET, FILM COATED, EXTENDED RELEASE ORAL at 08:22

## 2017-07-28 ASSESSMENT — COGNITIVE AND FUNCTIONAL STATUS - GENERAL
SUGGESTED CMS G CODE MODIFIER DAILY ACTIVITY: CH
TURNING FROM BACK TO SIDE WHILE IN FLAT BAD: A LITTLE
SUGGESTED CMS G CODE MODIFIER MOBILITY: CJ
MOVING TO AND FROM BED TO CHAIR: A LITTLE
MOBILITY SCORE: 21
DAILY ACTIVITIY SCORE: 24
CLIMB 3 TO 5 STEPS WITH RAILING: A LITTLE

## 2017-07-28 ASSESSMENT — PAIN SCALES - GENERAL
PAINLEVEL_OUTOF10: 7
PAINLEVEL_OUTOF10: 6
PAINLEVEL_OUTOF10: 5
PAINLEVEL_OUTOF10: 5
PAINLEVEL_OUTOF10: ASSUMED PAIN PRESENT

## 2017-07-28 ASSESSMENT — GAIT ASSESSMENTS
DISTANCE (FEET): 150
GAIT LEVEL OF ASSIST: STAND BY ASSIST
DEVIATION: ANTALGIC;STEP TO
ASSISTIVE DEVICE: FRONT WHEEL WALKER

## 2017-07-28 ASSESSMENT — ACTIVITIES OF DAILY LIVING (ADL): TOILETING: INDEPENDENT

## 2017-07-28 NOTE — PROGRESS NOTES
Report received. Assumed care of pt.  A/O x4. VSS. Responds appropriately. C/o of pain, medicated per MAR. Assessment complete dressing to L knee cdi, pt. Up to bathroom [id number] with FWW. Discussed POC,safety, mobility, pain control, d/c planning, pt verbalizes understanding. Explained importance of calling before getting OOB. Call light and belongings within reach. Bed in the lowest position. Treaded socks in place. Hourly rounding in progress. Will continue to monitor .

## 2017-07-28 NOTE — PROGRESS NOTES
"Patient seen and examined  No complaints    Blood pressure 106/53, pulse 90, temperature 36.9 °C (98.5 °F), resp. rate 16, height 1.753 m (5' 9\"), weight 96.8 kg (213 lb 6.5 oz), SpO2 95 %, not currently breastfeeding.    Recent Labs      07/25/17   1355  07/28/17   0249   WBC  7.1   --    RBC  4.97   --    HEMOGLOBIN  13.7  11.8*   HEMATOCRIT  43.6  38.2   MCV  87.7   --    MCH  27.6   --    MCHC  31.4*   --    RDW  49.3   --    PLATELETCT  309   --    MPV  10.3   --        No acute distress  Dressing clean dry and intact  Neurovascularly intact    Plan:  Doing well  Discharge home.            "

## 2017-07-28 NOTE — CARE PLAN
Problem: Communication  Goal: The ability to communicate needs accurately and effectively will improve  Intervention: Educate patient and significant other/support system about the plan of care, procedures, treatments, medications and allow for questions  Pt oriented to unit and unit routine, pt understands to use call light when needing assistance and uses appropriately. Medications and POC discussed with pt, all questions answered at this time,      Problem: Mobility  Goal: Risk for activity intolerance will decrease  Intervention: Encourage patient to increase activity level in collaboration with Interdisciplinary Team  Pt encouraged to get up and walk to restroom rather than bedpan, pt compliant and was able to walk with FWW to restroom.

## 2017-07-28 NOTE — PROGRESS NOTES
Pt doing well post op, VSS, A&Ox4. Pt oriented to unit and night shift routine. Pt has no complaints of nausea or vomiting and has been able to eat a sandwich without any complaints. Pt able to ambulate to restroom twice and has been able to void. Pain medicated per MAR. Pt has no complaints at this time.

## 2017-07-28 NOTE — DISCHARGE SUMMARY
Patient was admitted for a Total Knee Arthroplasty Revision.  Had no complications during the surgery. Did well post-operatively.     Recent Labs      07/25/17   1355   SODIUM  137   POTASSIUM  4.3   CHLORIDE  101   CO2  28   GLUCOSE  189*   BUN  14   CREATININE  1.03   CALCIUM  9.5     Recent Labs      07/25/17   1355  07/28/17   0249   WBC  7.1   --    RBC  4.97   --    HEMOGLOBIN  13.7  11.8*   HEMATOCRIT  43.6  38.2   MCV  87.7   --    MCH  27.6   --    MCHC  31.4*   --    RDW  49.3   --    PLATELETCT  309   --    MPV  10.3   --        Active Hospital Problems    Diagnosis   • Presence of left artificial knee joint [Z96.652]       Uneventful hospital course.     Medication List      CHANGE how you take these medications       Instructions    aspirin EC 81 MG Tbec   What changed:    - how much to take  - when to take this   Last time this was given:  81 mg on 7/28/2017  5:49 AM   Commonly known as:  ECOTRIN    Take 1 Tab by mouth 2 Times a Day.   Dose:  81 mg         CONTINUE taking these medications       Instructions    oxyCODONE CR 10 MG T12a   Commonly known as:  OXYCONTIN    Take 10 mg by mouth 4 times a day as needed.   Dose:  10 mg         ASK your doctor about these medications       Instructions    amlodipine 10 MG Tabs   Last time this was given:  10 mg on 7/27/2017  9:59 PM   Commonly known as:  NORVASC    Take 10 mg by mouth every day.   Dose:  10 mg       CALCIUM PO    Take 1 Tab by mouth every day.   Dose:  1 Tab       furosemide 40 MG Tabs   Commonly known as:  LASIX    Take 40 mg by mouth 1 time daily as needed.   Dose:  40 mg       gabapentin 100 MG Caps   Last time this was given:  100 mg on 7/27/2017  9:59 PM   Commonly known as:  NEURONTIN    Take 100 mg by mouth 4 times a day.   Dose:  100 mg       guaifenesin  MG Tb12   Last time this was given:  600 mg on 7/27/2017  9:59 PM   Commonly known as:  MUCINEX    Take 600 mg by mouth 2 Times a Day.   Dose:  600 mg       insulin glargine 100  UNIT/ML Soln   Last time this was given:  58 Units on 7/27/2017 10:24 PM   Commonly known as:  LANTUS    Inject 58 Units as instructed every evening.   Dose:  58 Units       losartan 100 MG Tabs   Last time this was given:  100 mg on 7/27/2017  9:59 PM   Commonly known as:  COZAAR    Take 100 mg by mouth every day.   Dose:  100 mg       metformin 1000 MG tablet   Last time this was given:  1,000 mg on 7/28/2017  5:49 AM   Commonly known as:  GLUCOPHAGE    Take 1,000 mg by mouth 2 times daily, before breakfast and dinner.   Dose:  1000 mg       MULTIPLE VITAMIN PO    Take 1 Tab by mouth every day.   Dose:  1 Tab       NEXIUM 40 MG delayed-release capsule   Generic drug:  esomeprazole    Take 40 mg by mouth every day.   Dose:  40 mg       NOVOLIN R 100 Unit/mL Soln   Last time this was given:  12 Units on 7/28/2017  6:07 AM   Generic drug:  insulin regular    Inject 12 Units as instructed every day. morning   Dose:  12 Units       SYNTHROID 25 MCG Tabs   Last time this was given:  25 mcg on 7/28/2017  5:49 AM   Generic drug:  levothyroxine    Take 25 mcg by mouth Every morning on an empty stomach.   Dose:  25 mcg       VESICARE 5 MG tablet   Generic drug:  solifenacin    Take 5 mg by mouth every day.   Dose:  5 mg       ZOCOR 20 MG Tabs   Last time this was given:  20 mg on 7/27/2017  9:59 PM   Generic drug:  simvastatin    Take 20 mg by mouth every evening.   Dose:  20 mg       zolpidem 12.5 MG CR tablet   Commonly known as:  AMBIEN CR    Take 12.5 mg by mouth at bedtime as needed.   Dose:  12.5 mg               Patient will be discharged home and follow up with Dr. Skinner clinic in 2 weeks, for which the patient already has scheduled. Patient to begin Physical Therapy.  Encourage ROM of the knee.

## 2017-07-28 NOTE — DISCHARGE INSTRUCTIONS
Discharge Instructions    Discharged to home by car with relative. Discharged via wheelchair, hospital escort: Yes.  Special equipment needed: Not Applicable    Be sure to schedule a follow-up appointment with your primary care doctor or any specialists as instructed.     Discharge Plan:     *Follow up with Dr. Skinner at scheduled appointment  *Weight bearing as tolerated                     *Activity as tolerated  *Use assistive device for all activity  *Continue exercises provided by physical therapy  *Elevate leg as needed; Ok to have pillow under the ankle NO pillow under knee  *Ice as needed (20 minutes every 1-2 hours)  *Keep dressing in place until 08/1/17 postoperative day #5  *Starting 08/1/17 remove dressing and shower. Do not soak or scrub incision, after shower pat dry and leave open to air.  *No soaking of the incision; no baths, hot tubs, or swimming until cleared by doctor  Asprin 81mg twice a day for blood clot prevention        *Take medications as prescribed by doctor  *Call doctor’s office with any questions or concerns *Follow up with Dr. Skinner at scheduled appointment    Influenza Vaccine Indication: Not indicated: Previously immunized this influenza season and > 8 years of age    I understand that a diet low in cholesterol, fat, and sodium is recommended for good health. Unless I have been given specific instructions below for another diet, I accept this instruction as my diet prescription.   Other diet: Diabetic diet    Special Instructions: Discharge instructions for the Orthopedic Patient    Follow up with Primary Care Physician within 2 weeks of discharge to home, regarding:  Review of medications and diagnostic testing.  Surveillance for medical complications.  Workup and treatment of osteoporosis, if appropriate.     -Is this a Joint Replacement patient? Yes Total Joint Knee Replacement Discharge Instructions    Pain  - The goal is to slowly wean off the prescription pain medicine.  - Ice  can be used for pain control.  20 minutes at a time is recommended, and never directly against your skin or incision.  - Most patients are off the pain pills by 3 weeks; others may require a low level of pain medications for many months.  If your pain continues to be severe, follow up with your physician.  Infection    Knee joint infections; occur in fewer than 2% of patients. The most common causes of infection following total knee replacement surgery are from bacteria that enter the bloodstream during dental procedures, urinary tract infections, or skin infections. These bacteria can lodge around your knee replacement and cause an infection.  - Keep the incision as clean and dry as possible.  - Always wash your hands before touching your incision.  - Skin infections tend to develop around 7-10 days after surgery; most can be treated with oral antibiotics.  - Dental Care should be delayed for 3 months after surgery, your surgeon recommends taking a dose of antibiotics 1 hour prior to any dental procedure. After 2 years, most surgeons recommend antibiotics only before an extensive procedure.  Ask your surgeon what he recommends.  - Signs and symptoms of infection can include:  low grade fever, redness, pain, swelling and drainage from your incision.  Notify your surgeon immediately if you develop any of these symptoms.  Other instructions  - Bowel habits - constipation is extremely common and is caused by a combination of anesthesia, lack of mobility and pain medicine.  Use stool softeners or laxatives if necessary. It is important not to ignore this problem, as bowel obstructions can be a serious complication after joint replacement surgery.  - Mood/Energy Level - Many patients experience a lack of energy and endurance for up to 2-3 months after surgery.  Some may also feel down and can even become depressed.  This is likely due to the postoperative anemia, change in activity level, lack of sleep, pain medicine and  just the emotional reaction to the surgery itself that is a big disruption in a person’s life.  This usually passes.  If symptoms persist, follow up with your primary physician.  - Returning to work - Your surgeon will give you more specific instructions. Depending on the type of activities you perform, it may be 6 to 8 weeks before you return to work.   Generally, if you work a sedentary job requiring little standing or walking, most patients may return within 2-6 weeks.  Manual labor jobs involving walking, lifting and standing may take longer. Your surgeon’s office can provide a release to part-time or light duty work early on in your recovery and progress you to full duty as able.    - Driving - If your left knee was replaced and you have an automatic transmission, you may be able to begin driving in a week or so, provided you are no longer taking narcotic pain medication. If your right knee was replaced, avoid driving for 6 to 8 weeks. Remember that your reflexes may not be as sharp as before your surgery. Ask your surgeon for specific instructions.   - Avoiding falls - A fall during the first few weeks after surgery can damage your new knee and may result in a need for further surgery.   throw rugs and tack down loose carpeting.  Be aware of floor hazards such as pets, small objects or uneven surfaces.    - [employer] Metal Detectors - The sensitivity of metal detectors varies and it is likely that your prosthesis will cause an alarm.  Inform the  of your artificial joint.  Diet  - Resume your normal diet as tolerated.  - It is important to achieve a healthy nutritional status by eating a well balanced diet on a regular basis.  - Your physician may recommend that you take iron and vitamin supplements.   - Continue to drink plenty of fluids.  Shower/Bathing  - You may shower as soon as you get home from the hospital unless otherwise instructed.  - Keep your incision out of water.  To keep  the incision dry when showering, cover it with a plastic bag or plastic wrap.  - Pat incision dry if it gets wet.  Don’t rub.  - Do not submerge in a bath until staples are out and the incision is completely healed. (Approximately 6-8 weeks)  Dressing Change:  Procedure (if recommended by your physician)  - Wash hands.  - Open all new dressing change materials.  - Remove old dressing and discard.  - Inspect incision for redness, increase in clear drainage, yellow/green drainage, odor and surrounding skin hot to touch.  -  ABD (large gauze) pad or “island dressing” by one corner and lay over the incision.  Be careful not to touch the inside of the dressing that will lay over the incision.  - Secure in place as instructed (Ace wrap or tape).    Swelling/Bruising    - Swelling can last from 3-6 months.  - Elevate your leg higher than your heart while reclining.   The first week you are home you should elevate your leg an equal amount of time, as you are active.    - Anti-inflammatory pills can be taken once you have stopped the blood thinners.  - The swelling is usually worse after you go home since you are upright for longer periods of time.  - Bruising is common and can involve the entire leg including the thigh, calf and even foot. Bruising often does not appear until after you arrive home and it can be quite dramatic- purple, black, and green.  The bruising you can see is not usually concerning and will subside without any treatment.      Blood Clot Prevention  Blood clots in the legs and the less common, but frightening, clots that travel to the lungs are a real focus of our preventative. Most patients are at standard risk for them, but those patients who are at higher risk include people who have had previous clots, a family history of clotting, smoking, diabetes, obesity, advanced age, use of estrogen and a sedentary lifestyle.    - Signs of blood clots in legs - Swelling in thigh, calf or ankle that does  not go down with elevation.  Pain, heat and tenderness in calf, back of calf or groin area.  NOTE: blood clots can occur in either leg.  - You have been receiving anticoagulant therapy (blood thinners) in the hospital and you may be instructed to continue at home depending on your risk factors.  - Your risk for developing a clot continues for up to 2-3 months after surgery.  You should avoid prolonged sitting and dehydration during that time (long air trips and car trips).  If you do take a trip during this time, please get up and move around every 1- 1.5 hours.  - If you are prescribed blood-thinning medication for home, follow instructions as directed. (Handouts provided if applicable).      Activity  Once home, you should continue to stay active. The key is to remember not to overdo it! While you can expect some good days and some bad days, you should notice a gradual improvement and a gradual increase in your endurance over the next 6 to 12 months. Exercise is a critical component of home care, particularly during the first few weeks after surgery.     - Normal activities of daily living You should be able to resume most within 3 to 6 weeks following surgery. Some pain with activity and at night is common for several weeks after surgery  -  Physical Therapy Exercises - Continue to do the exercises prescribed for at least two months after surgery. Riding a stationary bicycle can help maintain muscle tone and keep your knee flexible. Try to achieve the maximum degree of bending and extension possible. (handout provided by Therapist).  - Sexual Activity -. Your surgeon can tell you when it safe to resume sexual activity.    - Sleeping Positions - You can safely sleep on your back, on either side, or on your stomach.   - Other Activities - Walk as much as you like, but remember that walking is no substitute for the exercises your doctor and physical therapist will prescribe. Lower impact activities are preferred.   If you have specific questions, consult your Surgeon.    When to Call the Doctor   Call the physician if:   - Fever over 100.5? F  - Increased pain, drainage, redness, odor or heat around the incision area  - Shaking chills  - Increased knee pain with activity and rest  - Increased pain in calf, tenderness or redness above or below the knee  - Increased swelling of calf, ankle, foot  - Sudden increased shortness of breath, sudden onset of chest pain, localized chest pain with coughing  - Incision opening  Or, if there are any questions or concerns about medications or care.       -Is this patient being discharged with medication to prevent blood clots?  Yes, Aspirin Aspirin, ASA oral tablets  What is this medicine?  ASPIRIN (AS pir in) is a pain reliever. It is used to treat mild pain and fever. This medicine is also used as directed by a doctor to prevent and to treat heart attacks, to prevent strokes, and to treat arthritis or inflammation.  This medicine may be used for other purposes; ask your health care provider or pharmacist if you have questions.  COMMON BRAND NAME(S): Aspir-Low, Aspir-Brittanie , Aspirtab , [employer] Advanced Aspirin, [employer] Aspirin Extra Strength, [employer] Aspirin Plus, [employer] Aspirin, [employer] Genuine Aspirin, [employer] Womens Aspirin , Bufferin Extra Strength, Bufferin Low Dose, Bufferin  What should I tell my health care provider before I take this medicine?  They need to know if you have any of these conditions:  -anemia  -asthma  -bleeding problems  -child with chickenpox, the flu, or other viral infection  -diabetes  -gout  -if you frequently drink alcohol containing drinks  -kidney disease  -liver disease  -low level of vitamin K  -lupus  -smoke tobacco  -stomach ulcers or other problems  -an unusual or allergic reaction to aspirin, tartrazine dye, other medicines, dyes, or preservatives  -pregnant or trying to get pregnant  -breast-feeding  How should I use this medicine?  Take this medicine by mouth  with a glass of water. Follow the directions on the package or prescription label. You can take this medicine with or without food. If it upsets your stomach, take it with food. Do not take your medicine more often than directed.  Talk to your pediatrician regarding the use of this medicine in children. While this drug may be prescribed for children as young as 12 years of age for selected conditions, precautions do apply. Children and teenagers should not use this medicine to treat chicken pox or flu symptoms unless directed by a doctor.  Patients over 65 years old may have a stronger reaction and need a smaller dose.  Overdosage: If you think you have taken too much of this medicine contact a poison control center or emergency room at once.  NOTE: This medicine is only for you. Do not share this medicine with others.  What if I miss a dose?  If you are taking this medicine on a regular schedule and miss a dose, take it as soon as you can. If it is almost time for your next dose, take only that dose. Do not take double or extra doses.  What may interact with this medicine?  Do not take this medicine with any of the following medications:  -cidofovir  -ketorolac  -probenecid  This medicine may also interact with the following medications:  -alcohol  -alendronate  -bismuth subsalicylate  -flavocoxid  -herbal supplements like feverfew, garlic, luciana, ginkgo biloba, horse chestnut  -medicines for diabetes or glaucoma like acetazolamide, methazolamide  -medicines for gout  -medicines that treat or prevent blood clots like enoxaparin, heparin, ticlopidine, warfarin  -other aspirin and aspirin-like medicines  -NSAIDs, medicines for pain and inflammation, like ibuprofen or naproxen  -pemetrexed  -sulfinpyrazone  -varicella live vaccine  This list may not describe all possible interactions. Give your health care provider a list of all the medicines, herbs, non-prescription drugs, or dietary supplements you use. Also tell  them if you smoke, drink alcohol, or use illegal drugs. Some items may interact with your medicine.  What should I watch for while using this medicine?  If you are treating yourself for pain, tell your doctor or health care professional if the pain lasts more than 10 days, if it gets worse, or if there is a new or different kind of pain. Tell your doctor if you see redness or swelling. Also, check with your doctor if you have a fever that lasts for more than 3 days. Only take this medicine to prevent heart attacks or blood clotting if prescribed by your doctor or health care professional.  Do not take aspirin or aspirin-like medicines with this medicine. Too much aspirin can be dangerous. Always read the labels carefully.  This medicine can irritate your stomach or cause bleeding problems. Do not smoke cigarettes or drink alcohol while taking this medicine. Do not lie down for 30 minutes after taking this medicine to prevent irritation to your throat.  If you are scheduled for any medical or dental procedure, tell your healthcare provider that you are taking this medicine. You may need to stop taking this medicine before the procedure.  What side effects may I notice from receiving this medicine?  Side effects that you should report to your doctor or health care professional as soon as possible:  -allergic reactions like skin rash, itching or hives, swelling of the face, lips, or tongue  -black, tarry stools  -bloody, coffee ground-like vomit  -breathing problems  -changes in hearing, ringing in the ears  -confusion  -general ill feeling or flu-like symptoms  -pain on swallowing  -redness, blistering, peeling or loosening of the skin, including inside the mouth or nose  -trouble passing urine or change in the amount of urine  -unusual bleeding or bruising  -unusually weak or tired  -yellowing of the eyes or skin  Side effects that usually do not require medical attention (report to your doctor or health care  professional if they continue or are bothersome):  -diarrhea or constipation  -nausea, vomiting  -stomach gas, heartburn  This list may not describe all possible side effects. Call your doctor for medical advice about side effects. You may report side effects to FDA at 5-394-HRM-1238.  Where should I keep my medicine?  Keep out of the reach of children.  Store at room temperature between 15 and 30 degrees C (59 and 86 degrees F). Protect from heat and moisture. Do not use this medicine if it has a strong vinegar smell. Throw away any unused medicine after the expiration date.  NOTE: This sheet is a summary. It may not cover all possible information. If you have questions about this medicine, talk to your doctor, pharmacist, or health care provider.  © 2014, [employer]/Gold Standard. (3/10/2009 10:44:17 AM)      · Is patient discharged on Warfarin / Coumadin?   No     · Is patient Post Blood Transfusion?  No    Incision Care  An incision is when a surgeon cuts into your body. After surgery, the incision needs to be cared for properly to prevent infection.   HOW TO CARE FOR YOUR INCISION  · Take medicines only as directed by your health care provider.  · There are many different ways to close and cover an incision, including stitches, skin glue, and adhesive strips. Follow your health care provider's instructions on:  ¨ Incision care.  ¨ Bandage (dressing) changes and removal.  ¨ Incision closure removal.  · Do not take baths, swim, or use a hot tub until your health care provider approves. You may shower as directed by your health care provider.  · Resume your normal diet and activities as directed.  · Use anti-itch medicine (such as an antihistamine) as directed by your health care provider. The incision may itch while it is healing. Do not pick or scratch at the incision.  · Drink enough fluid to keep your urine clear or pale yellow.  SEEK MEDICAL CARE IF:   · You have drainage, redness, swelling, or pain at your  incision site.  · You have muscle aches, chills, or a general ill feeling.  · You notice a bad smell coming from the incision or dressing.  · Your incision edges separate after the sutures, staples, or skin adhesive strips have been removed.  · You have persistent nausea or vomiting.  · You have a fever.  · You are dizzy.  SEEK IMMEDIATE MEDICAL CARE IF:   · You have a rash.  · You faint.  · You have difficulty breathing.  MAKE SURE YOU:   · Understand these instructions.  · Will watch your condition.  · Will get help right away if you are not doing well or get worse.     This information is not intended to replace advice given to you by your health care provider. Make sure you discuss any questions you have with your health care provider.     Document Released: 07/07/2006 Document Revised: 01/08/2016 Document Reviewed: 02/11/2015  [employer] Interactive Patient Education ©2016 [employer]    Depression / Suicide Risk    As you are discharged from this [facility] facility, it is important to learn how to keep safe from harming yourself.    Recognize the warning signs:  · Abrupt changes in personality, positive or negative- including increase in energy   · Giving away possessions  · Change in eating patterns- significant weight changes-  positive or negative  · Change in sleeping patterns- unable to sleep or sleeping all the time   · Unwillingness or inability to communicate  · Depression  · Unusual sadness, discouragement and loneliness  · Talk of wanting to die  · Neglect of personal appearance   · Rebelliousness- reckless behavior  · Withdrawal from people/activities they love  · Confusion- inability to concentrate     If you or a loved one observes any of these behaviors or has concerns about self-harm, here's what you can do:  · Talk about it- your feelings and reasons for harming yourself  · Remove any means that you might use to hurt yourself (examples: pills, rope, extension cords, firearm)  · Get  professional help from the community (Mental Health, Substance Abuse, psychological counseling)  · Do not be alone:Call your Safe Contact- someone whom you trust who will be there for you.  · Call your local CRISIS HOTLINE 987-1570 or 530-566-0387  · Call your local Children's Mobile Crisis Response Team [place] (184) 513-0543 or www.[employer]  · Call the toll free National Suicide Prevention Hotlines   · National Suicide Prevention Lifeline 406-391-NIXH (0810)  · National Hope Line Network 800-SUICIDE (825-2724)

## 2017-07-28 NOTE — THERAPY
"Physical Therapy Evaluation completed.   Bed Mobility:  Supine to Sit: Stand by Assist  Transfers: Sit to Stand: Stand by Assist  Gait: Level Of Assist: Stand by Assist with Front-Wheel Walker       Plan of Care: Will benefit from Physical Therapy 4 times per week  Discharge Recommendations: Equipment: No Equipment Needed. Post-acute therapy Discharge to home with outpatient or home health for additional skilled therapy services.    Pt presents with mild pain to operative LE and demonstrates flat foot strike at initial contact as well as step-to pattern during gait. She did not require physical assist for gait or stairs, but did need assist for lifting L LE onto bed against gravity. Pt reports spouse can assist prn. Should she remain in hospital, pt will be seen to progress mobility. If not, will be appropriate for OP PT follow up.     See \"Rehab Therapy-Acute\" Patient Summary Report for complete documentation.     "

## 2017-07-28 NOTE — OR SURGEON
Operative Report    PreOp Diagnosis: failed left TKA    PostOp Diagnosis: same    Procedure(s):  KNEE REVISION TOTAL - Wound Class: Clean    Surgeon(s):  Isidro Skinner M.D.    Anesthesiologist/Type of Anesthesia:  Anesthesiologist: Judah Howell M.D.; Tobey B Gansert, M.D./General    Surgical Staff:  Circulator: Juliana Lowery R.N.  Limb Chandler: Minnie Kennedy  Relief Circulator: Genie Castro R.N.  Scrub Person: Carol Loyola    Specimens:  * No specimens in log *    Estimated Blood Loss: 200 mL    Findings: see op note    Complications: none        7/27/2017 5:43 PM Víctor Alvares

## 2017-07-28 NOTE — THERAPY
"Occupational Therapy Evaluation completed.   Functional Status:  Up EOB, spv w/LB dressing educated pt and spouse on use of AE for dressing and home activities, spv w/sit>stand walking in room w/fww no overt c/o pain or fatigue remained EOB for meal provided w/ice   Plan of Care: Patient with no further skilled OT needs in the acute care setting at this time  Discharge Recommendations:  Equipment: No Equipment Needed. Post-acute therapy Currently anticipate no further skilled therapy needs once patient is discharged from the inpatient setting.    See \"Rehab Therapy-Acute\" Patient Summary Report for complete documentation.    "

## 2017-07-28 NOTE — PROGRESS NOTES
Pt. D/C'd home with  [facility].  Discharge instructions provided to pt.  Pt states understanding.  Pt states all questions have been answered.  Copy of discharge provided to pt.  Signed copy in chart.  Prescriptions provided to pt, copy in chart. Pt states that all personal belongings are in possession. Pt escorted off unit with assistance from CNA without incident.

## 2020-03-03 ENCOUNTER — APPOINTMENT (RX ONLY)
Dept: URBAN - NONMETROPOLITAN AREA CLINIC 15 | Facility: CLINIC | Age: 80
Setting detail: DERMATOLOGY
End: 2020-03-03

## 2020-03-03 DIAGNOSIS — D18.0 HEMANGIOMA: ICD-10-CM

## 2020-03-03 DIAGNOSIS — L57.0 ACTINIC KERATOSIS: ICD-10-CM

## 2020-03-03 DIAGNOSIS — D22 MELANOCYTIC NEVI: ICD-10-CM

## 2020-03-03 DIAGNOSIS — L81.4 OTHER MELANIN HYPERPIGMENTATION: ICD-10-CM

## 2020-03-03 DIAGNOSIS — L82.1 OTHER SEBORRHEIC KERATOSIS: ICD-10-CM

## 2020-03-03 PROBLEM — D18.01 HEMANGIOMA OF SKIN AND SUBCUTANEOUS TISSUE: Status: ACTIVE | Noted: 2020-03-03

## 2020-03-03 PROBLEM — D22.62 MELANOCYTIC NEVI OF LEFT UPPER LIMB, INCLUDING SHOULDER: Status: ACTIVE | Noted: 2020-03-03

## 2020-03-03 PROBLEM — D22.5 MELANOCYTIC NEVI OF TRUNK: Status: ACTIVE | Noted: 2020-03-03

## 2020-03-03 PROBLEM — D22.72 MELANOCYTIC NEVI OF LEFT LOWER LIMB, INCLUDING HIP: Status: ACTIVE | Noted: 2020-03-03

## 2020-03-03 PROBLEM — D22.61 MELANOCYTIC NEVI OF RIGHT UPPER LIMB, INCLUDING SHOULDER: Status: ACTIVE | Noted: 2020-03-03

## 2020-03-03 PROBLEM — D22.71 MELANOCYTIC NEVI OF RIGHT LOWER LIMB, INCLUDING HIP: Status: ACTIVE | Noted: 2020-03-03

## 2020-03-03 PROCEDURE — ? COUNSELING

## 2020-03-03 PROCEDURE — 17003 DESTRUCT PREMALG LES 2-14: CPT

## 2020-03-03 PROCEDURE — 99213 OFFICE O/P EST LOW 20 MIN: CPT | Mod: 25

## 2020-03-03 PROCEDURE — 17000 DESTRUCT PREMALG LESION: CPT

## 2020-03-03 PROCEDURE — ? LIQUID NITROGEN

## 2020-03-03 ASSESSMENT — LOCATION SIMPLE DESCRIPTION DERM
LOCATION SIMPLE: ABDOMEN
LOCATION SIMPLE: RIGHT UPPER BACK
LOCATION SIMPLE: RIGHT EYEBROW
LOCATION SIMPLE: LEFT FOREHEAD
LOCATION SIMPLE: LEFT FOREARM
LOCATION SIMPLE: RIGHT THIGH
LOCATION SIMPLE: LEFT CHEEK
LOCATION SIMPLE: LEFT PRETIBIAL REGION
LOCATION SIMPLE: NOSE
LOCATION SIMPLE: LEFT POSTERIOR UPPER ARM
LOCATION SIMPLE: RIGHT POSTERIOR THIGH
LOCATION SIMPLE: LEFT POPLITEAL SKIN
LOCATION SIMPLE: LEFT NOSE
LOCATION SIMPLE: RIGHT FOREARM
LOCATION SIMPLE: RIGHT PRETIBIAL REGION
LOCATION SIMPLE: UPPER BACK
LOCATION SIMPLE: RIGHT POPLITEAL SKIN
LOCATION SIMPLE: RIGHT POSTERIOR UPPER ARM
LOCATION SIMPLE: CHEST
LOCATION SIMPLE: LEFT POSTERIOR THIGH
LOCATION SIMPLE: LEFT ELBOW

## 2020-03-03 ASSESSMENT — LOCATION DETAILED DESCRIPTION DERM
LOCATION DETAILED: RIGHT POPLITEAL SKIN
LOCATION DETAILED: LEFT LATERAL ELBOW
LOCATION DETAILED: RIGHT VENTRAL PROXIMAL FOREARM
LOCATION DETAILED: LEFT INFERIOR CENTRAL MALAR CHEEK
LOCATION DETAILED: RIGHT ANTERIOR DISTAL THIGH
LOCATION DETAILED: RIGHT DISTAL POSTERIOR UPPER ARM
LOCATION DETAILED: SUBXIPHOID
LOCATION DETAILED: LEFT DISTAL POSTERIOR THIGH
LOCATION DETAILED: MIDDLE STERNUM
LOCATION DETAILED: RIGHT PROXIMAL DORSAL FOREARM
LOCATION DETAILED: LEFT INFERIOR FOREHEAD
LOCATION DETAILED: RIGHT LATERAL SUPERIOR CHEST
LOCATION DETAILED: LEFT DISTAL DORSAL FOREARM
LOCATION DETAILED: LEFT VENTRAL LATERAL PROXIMAL FOREARM
LOCATION DETAILED: LEFT NASAL SIDEWALL
LOCATION DETAILED: RIGHT DISTAL POSTERIOR THIGH
LOCATION DETAILED: RIGHT PROXIMAL PRETIBIAL REGION
LOCATION DETAILED: LEFT POPLITEAL SKIN
LOCATION DETAILED: NASAL DORSUM
LOCATION DETAILED: RIGHT PROXIMAL POSTERIOR UPPER ARM
LOCATION DETAILED: LOWER STERNUM
LOCATION DETAILED: RIGHT CENTRAL EYEBROW
LOCATION DETAILED: INFERIOR THORACIC SPINE
LOCATION DETAILED: RIGHT SUPERIOR UPPER BACK
LOCATION DETAILED: LEFT PROXIMAL PRETIBIAL REGION
LOCATION DETAILED: LEFT DISTAL POSTERIOR UPPER ARM

## 2020-03-03 ASSESSMENT — LOCATION ZONE DERM
LOCATION ZONE: FACE
LOCATION ZONE: LEG
LOCATION ZONE: ARM
LOCATION ZONE: TRUNK
LOCATION ZONE: NOSE

## 2021-11-30 ENCOUNTER — APPOINTMENT (RX ONLY)
Dept: URBAN - NONMETROPOLITAN AREA CLINIC 15 | Facility: CLINIC | Age: 81
Setting detail: DERMATOLOGY
End: 2021-11-30

## 2021-11-30 DIAGNOSIS — L30.4 ERYTHEMA INTERTRIGO: ICD-10-CM

## 2021-11-30 DIAGNOSIS — L82.1 OTHER SEBORRHEIC KERATOSIS: ICD-10-CM

## 2021-11-30 PROCEDURE — ? COUNSELING

## 2021-11-30 PROCEDURE — 99213 OFFICE O/P EST LOW 20 MIN: CPT

## 2021-11-30 PROCEDURE — ? PRESCRIPTION

## 2021-11-30 RX ORDER — CLOTRIMAZOLE AND BETAMETHASONE DIPROPIONATE 10; .5 MG/G; MG/G
1 CREAM TOPICAL BID
Qty: 45 | Refills: 1 | Status: ERX | COMMUNITY
Start: 2021-11-30

## 2021-11-30 RX ADMIN — CLOTRIMAZOLE AND BETAMETHASONE DIPROPIONATE 1: 10; .5 CREAM TOPICAL at 00:00

## 2021-11-30 ASSESSMENT — LOCATION SIMPLE DESCRIPTION DERM
LOCATION SIMPLE: RIGHT UPPER BACK
LOCATION SIMPLE: RIGHT BREAST
LOCATION SIMPLE: LEFT BREAST
LOCATION SIMPLE: CHEST

## 2021-11-30 ASSESSMENT — LOCATION DETAILED DESCRIPTION DERM
LOCATION DETAILED: LEFT MEDIAL BREAST 7-8:00 REGION
LOCATION DETAILED: MIDDLE STERNUM
LOCATION DETAILED: RIGHT INFERIOR UPPER BACK
LOCATION DETAILED: RIGHT MEDIAL BREAST 5-6:00 REGION

## 2021-11-30 ASSESSMENT — LOCATION ZONE DERM: LOCATION ZONE: TRUNK

## 2022-01-26 ENCOUNTER — APPOINTMENT (RX ONLY)
Dept: URBAN - NONMETROPOLITAN AREA CLINIC 15 | Facility: CLINIC | Age: 82
Setting detail: DERMATOLOGY
End: 2022-01-26

## 2022-01-26 DIAGNOSIS — L30.4 ERYTHEMA INTERTRIGO: ICD-10-CM

## 2022-01-26 DIAGNOSIS — L82.1 OTHER SEBORRHEIC KERATOSIS: ICD-10-CM

## 2022-01-26 PROCEDURE — 99213 OFFICE O/P EST LOW 20 MIN: CPT

## 2022-01-26 PROCEDURE — ? PRESCRIPTION

## 2022-01-26 PROCEDURE — ? COUNSELING

## 2022-01-26 RX ORDER — KETOCONAZOLE 20 MG/G
CREAM TOPICAL BID
Qty: 60 | Refills: 12 | Status: ERX | COMMUNITY
Start: 2022-01-26

## 2022-01-26 RX ADMIN — KETOCONAZOLE: 20 CREAM TOPICAL at 00:00

## 2022-01-26 ASSESSMENT — LOCATION DETAILED DESCRIPTION DERM
LOCATION DETAILED: LEFT MEDIAL BREAST 6-7:00 REGION
LOCATION DETAILED: RIGHT LATERAL BREAST 6-7:00 REGION
LOCATION DETAILED: RIGHT MEDIAL BREAST 5-6:00 REGION
LOCATION DETAILED: LEFT MEDIAL BREAST 7-8:00 REGION

## 2022-01-26 ASSESSMENT — LOCATION SIMPLE DESCRIPTION DERM
LOCATION SIMPLE: LEFT BREAST
LOCATION SIMPLE: RIGHT BREAST

## 2022-01-26 ASSESSMENT — LOCATION ZONE DERM: LOCATION ZONE: TRUNK

## 2024-11-12 ENCOUNTER — HOSPITAL ENCOUNTER (OUTPATIENT)
Dept: RADIOLOGY | Facility: MEDICAL CENTER | Age: 84
End: 2024-11-12

## 2024-11-12 ENCOUNTER — APPOINTMENT (OUTPATIENT)
Dept: RADIOLOGY | Facility: MEDICAL CENTER | Age: 84
DRG: 418 | End: 2024-11-12
Attending: EMERGENCY MEDICINE
Payer: MEDICARE

## 2024-11-12 ENCOUNTER — HOSPITAL ENCOUNTER (INPATIENT)
Facility: MEDICAL CENTER | Age: 84
LOS: 8 days | DRG: 418 | End: 2024-11-20
Attending: EMERGENCY MEDICINE | Admitting: STUDENT IN AN ORGANIZED HEALTH CARE EDUCATION/TRAINING PROGRAM
Payer: MEDICARE

## 2024-11-12 DIAGNOSIS — R33.9 URINARY RETENTION: ICD-10-CM

## 2024-11-12 DIAGNOSIS — Z79.4 TYPE 2 DIABETES MELLITUS WITH HYPERGLYCEMIA, WITH LONG-TERM CURRENT USE OF INSULIN (HCC): ICD-10-CM

## 2024-11-12 DIAGNOSIS — E11.65 TYPE 2 DIABETES MELLITUS WITH HYPERGLYCEMIA, WITH LONG-TERM CURRENT USE OF INSULIN (HCC): ICD-10-CM

## 2024-11-12 DIAGNOSIS — S32.001A CLOSED BURST FRACTURE OF LUMBAR VERTEBRA, INITIAL ENCOUNTER (HCC): ICD-10-CM

## 2024-11-12 DIAGNOSIS — I48.91 ATRIAL FIBRILLATION WITH RVR (HCC): ICD-10-CM

## 2024-11-12 PROBLEM — E87.1 HYPONATREMIA: Status: ACTIVE | Noted: 2024-11-12

## 2024-11-12 PROBLEM — I10 PRIMARY HYPERTENSION: Status: ACTIVE | Noted: 2024-11-12

## 2024-11-12 PROBLEM — S32.011A CLOSED STABLE BURST FRACTURE OF FIRST LUMBAR VERTEBRA (HCC): Status: ACTIVE | Noted: 2024-11-12

## 2024-11-12 PROBLEM — E03.9 ACQUIRED HYPOTHYROIDISM: Status: ACTIVE | Noted: 2024-11-12

## 2024-11-12 PROBLEM — N30.00 ACUTE CYSTITIS WITHOUT HEMATURIA: Status: ACTIVE | Noted: 2024-11-12

## 2024-11-12 PROBLEM — K59.00 CONSTIPATION: Status: ACTIVE | Noted: 2024-11-12

## 2024-11-12 PROBLEM — E78.5 DYSLIPIDEMIA: Status: ACTIVE | Noted: 2024-11-12

## 2024-11-12 LAB
ALBUMIN SERPL BCP-MCNC: 2.7 G/DL (ref 3.2–4.9)
ALBUMIN/GLOB SERPL: 0.7 G/DL
ALP SERPL-CCNC: 158 U/L (ref 30–99)
ALT SERPL-CCNC: 12 U/L (ref 2–50)
ANION GAP SERPL CALC-SCNC: 12 MMOL/L (ref 7–16)
ANION GAP SERPL CALC-SCNC: 15 MMOL/L (ref 7–16)
ANION GAP SERPL CALC-SCNC: 15 MMOL/L (ref 7–16)
APPEARANCE UR: CLEAR
AST SERPL-CCNC: 17 U/L (ref 12–45)
BACTERIA #/AREA URNS HPF: ABNORMAL /HPF
BASOPHILS # BLD AUTO: 0.1 % (ref 0–1.8)
BASOPHILS # BLD: 0.02 K/UL (ref 0–0.12)
BILIRUB SERPL-MCNC: 0.8 MG/DL (ref 0.1–1.5)
BILIRUB UR QL STRIP.AUTO: NEGATIVE
BUN SERPL-MCNC: 15 MG/DL (ref 8–22)
BUN SERPL-MCNC: 17 MG/DL (ref 8–22)
BUN SERPL-MCNC: 19 MG/DL (ref 8–22)
CALCIUM ALBUM COR SERPL-MCNC: 10.3 MG/DL (ref 8.5–10.5)
CALCIUM SERPL-MCNC: 9.1 MG/DL (ref 8.5–10.5)
CALCIUM SERPL-MCNC: 9.3 MG/DL (ref 8.5–10.5)
CALCIUM SERPL-MCNC: 9.3 MG/DL (ref 8.5–10.5)
CASTS URNS QL MICRO: ABNORMAL /LPF (ref 0–2)
CHLORIDE SERPL-SCNC: 83 MMOL/L (ref 96–112)
CHLORIDE SERPL-SCNC: 86 MMOL/L (ref 96–112)
CHLORIDE SERPL-SCNC: 86 MMOL/L (ref 96–112)
CO2 SERPL-SCNC: 26 MMOL/L (ref 20–33)
CO2 SERPL-SCNC: 27 MMOL/L (ref 20–33)
CO2 SERPL-SCNC: 29 MMOL/L (ref 20–33)
COLOR UR: YELLOW
CREAT SERPL-MCNC: 0.72 MG/DL (ref 0.5–1.4)
CREAT SERPL-MCNC: 0.73 MG/DL (ref 0.5–1.4)
CREAT SERPL-MCNC: 0.86 MG/DL (ref 0.5–1.4)
CREAT UR-MCNC: 53.1 MG/DL
EOSINOPHIL # BLD AUTO: 0.03 K/UL (ref 0–0.51)
EOSINOPHIL NFR BLD: 0.2 % (ref 0–6.9)
EPITHELIAL CELLS 1715: ABNORMAL /HPF (ref 0–5)
ERYTHROCYTE [DISTWIDTH] IN BLOOD BY AUTOMATED COUNT: 42.5 FL (ref 35.9–50)
GFR SERPLBLD CREATININE-BSD FMLA CKD-EPI: 67 ML/MIN/1.73 M 2
GFR SERPLBLD CREATININE-BSD FMLA CKD-EPI: 82 ML/MIN/1.73 M 2
GFR SERPLBLD CREATININE-BSD FMLA CKD-EPI: 83 ML/MIN/1.73 M 2
GLOBULIN SER CALC-MCNC: 3.9 G/DL (ref 1.9–3.5)
GLUCOSE BLD STRIP.AUTO-MCNC: 181 MG/DL (ref 65–99)
GLUCOSE BLD STRIP.AUTO-MCNC: 200 MG/DL (ref 65–99)
GLUCOSE BLD STRIP.AUTO-MCNC: 200 MG/DL (ref 65–99)
GLUCOSE SERPL-MCNC: 197 MG/DL (ref 65–99)
GLUCOSE SERPL-MCNC: 201 MG/DL (ref 65–99)
GLUCOSE SERPL-MCNC: 238 MG/DL (ref 65–99)
GLUCOSE UR STRIP.AUTO-MCNC: NEGATIVE MG/DL
HCT VFR BLD AUTO: 42.7 % (ref 37–47)
HGB BLD-MCNC: 14.4 G/DL (ref 12–16)
IMM GRANULOCYTES # BLD AUTO: 0.07 K/UL (ref 0–0.11)
IMM GRANULOCYTES NFR BLD AUTO: 0.5 % (ref 0–0.9)
KETONES UR STRIP.AUTO-MCNC: 15 MG/DL
LEUKOCYTE ESTERASE UR QL STRIP.AUTO: ABNORMAL
LYMPHOCYTES # BLD AUTO: 0.81 K/UL (ref 1–4.8)
LYMPHOCYTES NFR BLD: 5.4 % (ref 22–41)
MAGNESIUM SERPL-MCNC: 2 MG/DL (ref 1.5–2.5)
MCH RBC QN AUTO: 29.3 PG (ref 27–33)
MCHC RBC AUTO-ENTMCNC: 33.7 G/DL (ref 32.2–35.5)
MCV RBC AUTO: 87 FL (ref 81.4–97.8)
MICRO URNS: ABNORMAL
MONOCYTES # BLD AUTO: 0.73 K/UL (ref 0–0.85)
MONOCYTES NFR BLD AUTO: 4.9 % (ref 0–13.4)
NEUTROPHILS # BLD AUTO: 13.38 K/UL (ref 1.82–7.42)
NEUTROPHILS NFR BLD: 88.9 % (ref 44–72)
NITRITE UR QL STRIP.AUTO: NEGATIVE
NRBC # BLD AUTO: 0 K/UL
NRBC BLD-RTO: 0 /100 WBC (ref 0–0.2)
OSMOLALITY SERPL: 273 MOSM/KG H2O (ref 278–298)
OSMOLALITY UR: 377 MOSM/KG H2O (ref 300–900)
PH UR STRIP.AUTO: 6 [PH] (ref 5–8)
PHOSPHATE SERPL-MCNC: 2.5 MG/DL (ref 2.5–4.5)
PLATELET # BLD AUTO: 400 K/UL (ref 164–446)
PMV BLD AUTO: 9.7 FL (ref 9–12.9)
POTASSIUM SERPL-SCNC: 3.5 MMOL/L (ref 3.6–5.5)
POTASSIUM SERPL-SCNC: 3.5 MMOL/L (ref 3.6–5.5)
POTASSIUM SERPL-SCNC: 3.6 MMOL/L (ref 3.6–5.5)
PROT SERPL-MCNC: 6.6 G/DL (ref 6–8.2)
PROT UR QL STRIP: 30 MG/DL
RBC # BLD AUTO: 4.91 M/UL (ref 4.2–5.4)
RBC # URNS HPF: ABNORMAL /HPF (ref 0–2)
RBC UR QL AUTO: ABNORMAL
SODIUM SERPL-SCNC: 125 MMOL/L (ref 135–145)
SODIUM SERPL-SCNC: 127 MMOL/L (ref 135–145)
SODIUM SERPL-SCNC: 127 MMOL/L (ref 135–145)
SODIUM UR-SCNC: <20 MMOL/L
SP GR UR STRIP.AUTO: 1.03
UROBILINOGEN UR STRIP.AUTO-MCNC: 0.2 EU/DL
WBC # BLD AUTO: 15 K/UL (ref 4.8–10.8)
WBC #/AREA URNS HPF: ABNORMAL /HPF
YEAST BUDDING URNS QL: PRESENT /HPF

## 2024-11-12 PROCEDURE — 770001 HCHG ROOM/CARE - MED/SURG/GYN PRIV*

## 2024-11-12 PROCEDURE — 85025 COMPLETE CBC W/AUTO DIFF WBC: CPT

## 2024-11-12 PROCEDURE — A9270 NON-COVERED ITEM OR SERVICE: HCPCS

## 2024-11-12 PROCEDURE — 97602 WOUND(S) CARE NON-SELECTIVE: CPT

## 2024-11-12 PROCEDURE — 99285 EMERGENCY DEPT VISIT HI MDM: CPT

## 2024-11-12 PROCEDURE — 700102 HCHG RX REV CODE 250 W/ 637 OVERRIDE(OP): Mod: JZ | Performed by: STUDENT IN AN ORGANIZED HEALTH CARE EDUCATION/TRAINING PROGRAM

## 2024-11-12 PROCEDURE — 303105 HCHG CATHETER EXTRA

## 2024-11-12 PROCEDURE — 36415 COLL VENOUS BLD VENIPUNCTURE: CPT

## 2024-11-12 PROCEDURE — 700102 HCHG RX REV CODE 250 W/ 637 OVERRIDE(OP): Performed by: GENERAL PRACTICE

## 2024-11-12 PROCEDURE — 83930 ASSAY OF BLOOD OSMOLALITY: CPT

## 2024-11-12 PROCEDURE — 99223 1ST HOSP IP/OBS HIGH 75: CPT | Performed by: STUDENT IN AN ORGANIZED HEALTH CARE EDUCATION/TRAINING PROGRAM

## 2024-11-12 PROCEDURE — A9270 NON-COVERED ITEM OR SERVICE: HCPCS | Performed by: GENERAL PRACTICE

## 2024-11-12 PROCEDURE — A9270 NON-COVERED ITEM OR SERVICE: HCPCS | Mod: JZ | Performed by: STUDENT IN AN ORGANIZED HEALTH CARE EDUCATION/TRAINING PROGRAM

## 2024-11-12 PROCEDURE — 80048 BASIC METABOLIC PNL TOTAL CA: CPT

## 2024-11-12 PROCEDURE — 700102 HCHG RX REV CODE 250 W/ 637 OVERRIDE(OP)

## 2024-11-12 PROCEDURE — 80053 COMPREHEN METABOLIC PANEL: CPT

## 2024-11-12 PROCEDURE — 51702 INSERT TEMP BLADDER CATH: CPT

## 2024-11-12 PROCEDURE — 84100 ASSAY OF PHOSPHORUS: CPT

## 2024-11-12 PROCEDURE — 700111 HCHG RX REV CODE 636 W/ 250 OVERRIDE (IP): Performed by: STUDENT IN AN ORGANIZED HEALTH CARE EDUCATION/TRAINING PROGRAM

## 2024-11-12 PROCEDURE — 82962 GLUCOSE BLOOD TEST: CPT | Mod: 91

## 2024-11-12 PROCEDURE — 72148 MRI LUMBAR SPINE W/O DYE: CPT

## 2024-11-12 PROCEDURE — 82570 ASSAY OF URINE CREATININE: CPT

## 2024-11-12 PROCEDURE — 700105 HCHG RX REV CODE 258: Performed by: STUDENT IN AN ORGANIZED HEALTH CARE EDUCATION/TRAINING PROGRAM

## 2024-11-12 PROCEDURE — 84300 ASSAY OF URINE SODIUM: CPT

## 2024-11-12 PROCEDURE — 76705 ECHO EXAM OF ABDOMEN: CPT

## 2024-11-12 PROCEDURE — 83935 ASSAY OF URINE OSMOLALITY: CPT

## 2024-11-12 PROCEDURE — 81001 URINALYSIS AUTO W/SCOPE: CPT

## 2024-11-12 PROCEDURE — 83735 ASSAY OF MAGNESIUM: CPT

## 2024-11-12 RX ORDER — LEVOTHYROXINE SODIUM 50 UG/1
25 TABLET ORAL
Status: DISCONTINUED | OUTPATIENT
Start: 2024-11-12 | End: 2024-11-12

## 2024-11-12 RX ORDER — SIMVASTATIN 20 MG
20 TABLET ORAL NIGHTLY
Status: DISCONTINUED | OUTPATIENT
Start: 2024-11-12 | End: 2024-11-20 | Stop reason: HOSPADM

## 2024-11-12 RX ORDER — LABETALOL HYDROCHLORIDE 5 MG/ML
10 INJECTION, SOLUTION INTRAVENOUS EVERY 4 HOURS PRN
Status: DISCONTINUED | OUTPATIENT
Start: 2024-11-12 | End: 2024-11-13

## 2024-11-12 RX ORDER — POLYETHYLENE GLYCOL 3350 17 G/17G
1 POWDER, FOR SOLUTION ORAL
Status: DISCONTINUED | OUTPATIENT
Start: 2024-11-12 | End: 2024-11-20 | Stop reason: HOSPADM

## 2024-11-12 RX ORDER — HYDROMORPHONE HYDROCHLORIDE 1 MG/ML
0.25 INJECTION, SOLUTION INTRAMUSCULAR; INTRAVENOUS; SUBCUTANEOUS
Status: DISCONTINUED | OUTPATIENT
Start: 2024-11-12 | End: 2024-11-20 | Stop reason: HOSPADM

## 2024-11-12 RX ORDER — HYDROCODONE BITARTRATE AND ACETAMINOPHEN 5; 325 MG/1; MG/1
1 TABLET ORAL EVERY 4 HOURS PRN
Status: ON HOLD | COMMUNITY
End: 2024-11-20

## 2024-11-12 RX ORDER — MICONAZOLE NITRATE 20 MG/G
CREAM TOPICAL 2 TIMES DAILY
Status: COMPLETED | OUTPATIENT
Start: 2024-11-12 | End: 2024-11-19

## 2024-11-12 RX ORDER — ANASTROZOLE 1 MG/1
1 TABLET ORAL DAILY
COMMUNITY
Start: 2024-06-11 | End: 2025-06-04

## 2024-11-12 RX ORDER — POLYETHYLENE GLYCOL 3350 17 G/17G
1 POWDER, FOR SOLUTION ORAL
Status: DISCONTINUED | OUTPATIENT
Start: 2024-11-12 | End: 2024-11-12

## 2024-11-12 RX ORDER — ENOXAPARIN SODIUM 100 MG/ML
40 INJECTION SUBCUTANEOUS DAILY
Status: DISCONTINUED | OUTPATIENT
Start: 2024-11-12 | End: 2024-11-19

## 2024-11-12 RX ORDER — POTASSIUM CHLORIDE 1500 MG/1
40 TABLET, EXTENDED RELEASE ORAL ONCE
Status: COMPLETED | OUTPATIENT
Start: 2024-11-12 | End: 2024-11-12

## 2024-11-12 RX ORDER — AMOXICILLIN 250 MG
2 CAPSULE ORAL 2 TIMES DAILY
Status: DISCONTINUED | OUTPATIENT
Start: 2024-11-12 | End: 2024-11-20 | Stop reason: HOSPADM

## 2024-11-12 RX ORDER — AMLODIPINE BESYLATE 10 MG/1
10 TABLET ORAL EVERY EVENING
Status: DISCONTINUED | OUTPATIENT
Start: 2024-11-12 | End: 2024-11-20 | Stop reason: HOSPADM

## 2024-11-12 RX ORDER — ACETAMINOPHEN 325 MG/1
650 TABLET ORAL EVERY 6 HOURS PRN
COMMUNITY

## 2024-11-12 RX ORDER — BISACODYL 10 MG
10 SUPPOSITORY, RECTAL RECTAL
Status: DISCONTINUED | OUTPATIENT
Start: 2024-11-12 | End: 2024-11-20 | Stop reason: HOSPADM

## 2024-11-12 RX ORDER — SODIUM PHOSPHATE,MONO-DIBASIC 19G-7G/118
1 ENEMA (ML) RECTAL
Status: DISCONTINUED | OUTPATIENT
Start: 2024-11-12 | End: 2024-11-20 | Stop reason: HOSPADM

## 2024-11-12 RX ORDER — INSULIN LISPRO 100 [IU]/ML
3-14 INJECTION, SOLUTION INTRAVENOUS; SUBCUTANEOUS
Status: DISCONTINUED | OUTPATIENT
Start: 2024-11-12 | End: 2024-11-12

## 2024-11-12 RX ORDER — AMOXICILLIN 250 MG
2 CAPSULE ORAL EVERY EVENING
Status: DISCONTINUED | OUTPATIENT
Start: 2024-11-12 | End: 2024-11-12

## 2024-11-12 RX ORDER — INSULIN LISPRO 100 [IU]/ML
3-14 INJECTION, SOLUTION INTRAVENOUS; SUBCUTANEOUS
Status: DISCONTINUED | OUTPATIENT
Start: 2024-11-12 | End: 2024-11-20 | Stop reason: HOSPADM

## 2024-11-12 RX ORDER — DEXTROSE MONOHYDRATE 25 G/50ML
25 INJECTION, SOLUTION INTRAVENOUS
Status: DISCONTINUED | OUTPATIENT
Start: 2024-11-12 | End: 2024-11-20 | Stop reason: HOSPADM

## 2024-11-12 RX ORDER — OXYCODONE HYDROCHLORIDE 5 MG/1
5 TABLET ORAL
Status: DISCONTINUED | OUTPATIENT
Start: 2024-11-12 | End: 2024-11-20 | Stop reason: HOSPADM

## 2024-11-12 RX ORDER — SODIUM CHLORIDE 9 MG/ML
INJECTION, SOLUTION INTRAVENOUS CONTINUOUS
Status: DISCONTINUED | OUTPATIENT
Start: 2024-11-12 | End: 2024-11-13

## 2024-11-12 RX ORDER — MELOXICAM 15 MG/1
15 TABLET ORAL DAILY
COMMUNITY

## 2024-11-12 RX ORDER — POLYETHYLENE GLYCOL 3350 17 G/17G
1 POWDER, FOR SOLUTION ORAL DAILY
Status: DISCONTINUED | OUTPATIENT
Start: 2024-11-12 | End: 2024-11-20 | Stop reason: HOSPADM

## 2024-11-12 RX ORDER — INSULIN GLARGINE 100 [IU]/ML
30 INJECTION, SOLUTION SUBCUTANEOUS EVERY EVENING
Status: DISCONTINUED | OUTPATIENT
Start: 2024-11-12 | End: 2024-11-12

## 2024-11-12 RX ORDER — LOSARTAN POTASSIUM 50 MG/1
100 TABLET ORAL DAILY
Status: DISCONTINUED | OUTPATIENT
Start: 2024-11-13 | End: 2024-11-20 | Stop reason: HOSPADM

## 2024-11-12 RX ORDER — LORAZEPAM 2 MG/ML
1 INJECTION INTRAMUSCULAR
Status: DISCONTINUED | OUTPATIENT
Start: 2024-11-12 | End: 2024-11-20 | Stop reason: HOSPADM

## 2024-11-12 RX ORDER — DEXTROSE MONOHYDRATE 25 G/50ML
25 INJECTION, SOLUTION INTRAVENOUS
Status: DISCONTINUED | OUTPATIENT
Start: 2024-11-12 | End: 2024-11-12

## 2024-11-12 RX ORDER — TRAMADOL HYDROCHLORIDE 50 MG/1
50 TABLET ORAL EVERY 4 HOURS PRN
Status: DISCONTINUED | OUTPATIENT
Start: 2024-11-12 | End: 2024-11-12

## 2024-11-12 RX ORDER — ONDANSETRON 4 MG/1
4 TABLET, ORALLY DISINTEGRATING ORAL EVERY 6 HOURS PRN
COMMUNITY

## 2024-11-12 RX ORDER — OXYCODONE HYDROCHLORIDE 5 MG/1
2.5 TABLET ORAL
Status: DISCONTINUED | OUTPATIENT
Start: 2024-11-12 | End: 2024-11-20 | Stop reason: HOSPADM

## 2024-11-12 RX ORDER — ACETAMINOPHEN 325 MG/1
650 TABLET ORAL EVERY 6 HOURS PRN
Status: DISCONTINUED | OUTPATIENT
Start: 2024-11-12 | End: 2024-11-20 | Stop reason: HOSPADM

## 2024-11-12 RX ORDER — LIDOCAINE 4 G/G
1-3 PATCH TOPICAL EVERY 24 HOURS
COMMUNITY

## 2024-11-12 RX ORDER — ONDANSETRON 2 MG/ML
4 INJECTION INTRAMUSCULAR; INTRAVENOUS EVERY 4 HOURS PRN
Status: DISCONTINUED | OUTPATIENT
Start: 2024-11-12 | End: 2024-11-20 | Stop reason: HOSPADM

## 2024-11-12 RX ORDER — OXYCODONE HCL 10 MG/1
10 TABLET, FILM COATED, EXTENDED RELEASE ORAL 4 TIMES DAILY PRN
Status: DISCONTINUED | OUTPATIENT
Start: 2024-11-12 | End: 2024-11-12

## 2024-11-12 RX ADMIN — OXYCODONE 5 MG: 5 TABLET ORAL at 21:52

## 2024-11-12 RX ADMIN — INSULIN LISPRO 3 UNITS: 100 INJECTION, SOLUTION INTRAVENOUS; SUBCUTANEOUS at 22:04

## 2024-11-12 RX ADMIN — POTASSIUM CHLORIDE 40 MEQ: 1500 TABLET, EXTENDED RELEASE ORAL at 13:26

## 2024-11-12 RX ADMIN — SENNOSIDES AND DOCUSATE SODIUM 2 TABLET: 50; 8.6 TABLET ORAL at 16:59

## 2024-11-12 RX ADMIN — MICONAZOLE NITRATE: 2 CREAM TOPICAL at 21:52

## 2024-11-12 RX ADMIN — CEFAZOLIN 2 G: 10 INJECTION, POWDER, FOR SOLUTION INTRAVENOUS at 13:26

## 2024-11-12 RX ADMIN — POLYETHYLENE GLYCOL 3350 1 PACKET: 17 POWDER, FOR SOLUTION ORAL at 16:58

## 2024-11-12 RX ADMIN — ENOXAPARIN SODIUM 40 MG: 100 INJECTION SUBCUTANEOUS at 17:59

## 2024-11-12 RX ADMIN — CEFAZOLIN 2 G: 10 INJECTION, POWDER, FOR SOLUTION INTRAVENOUS at 09:14

## 2024-11-12 RX ADMIN — SODIUM CHLORIDE: 9 INJECTION, SOLUTION INTRAVENOUS at 13:33

## 2024-11-12 RX ADMIN — SIMVASTATIN 20 MG: 40 TABLET, FILM COATED ORAL at 21:52

## 2024-11-12 RX ADMIN — CEFAZOLIN 2 G: 10 INJECTION, POWDER, FOR SOLUTION INTRAVENOUS at 21:58

## 2024-11-12 RX ADMIN — INSULIN LISPRO 3 UNITS: 100 INJECTION, SOLUTION INTRAVENOUS; SUBCUTANEOUS at 17:56

## 2024-11-12 RX ADMIN — AMLODIPINE BESYLATE 10 MG: 10 TABLET ORAL at 17:59

## 2024-11-12 RX ADMIN — INSULIN LISPRO 3 UNITS: 100 INJECTION, SOLUTION INTRAVENOUS; SUBCUTANEOUS at 13:21

## 2024-11-12 SDOH — ECONOMIC STABILITY: TRANSPORTATION INSECURITY
IN THE PAST 12 MONTHS, HAS THE LACK OF TRANSPORTATION KEPT YOU FROM MEDICAL APPOINTMENTS OR FROM GETTING MEDICATIONS?: NO

## 2024-11-12 SDOH — ECONOMIC STABILITY: TRANSPORTATION INSECURITY
IN THE PAST 12 MONTHS, HAS LACK OF RELIABLE TRANSPORTATION KEPT YOU FROM MEDICAL APPOINTMENTS, MEETINGS, WORK OR FROM GETTING THINGS NEEDED FOR DAILY LIVING?: NO

## 2024-11-12 ASSESSMENT — ENCOUNTER SYMPTOMS
VOMITING: 0
DIZZINESS: 0
COUGH: 0
SHORTNESS OF BREATH: 0
CHILLS: 0
DIARRHEA: 0
ABDOMINAL PAIN: 0
BACK PAIN: 1
HEARTBURN: 0
PALPITATIONS: 0
FEVER: 0
NAUSEA: 0
HEADACHES: 0

## 2024-11-12 ASSESSMENT — PATIENT HEALTH QUESTIONNAIRE - PHQ9
2. FEELING DOWN, DEPRESSED, IRRITABLE, OR HOPELESS: NOT AT ALL
SUM OF ALL RESPONSES TO PHQ9 QUESTIONS 1 AND 2: 0
1. LITTLE INTEREST OR PLEASURE IN DOING THINGS: NOT AT ALL

## 2024-11-12 ASSESSMENT — PAIN DESCRIPTION - PAIN TYPE: TYPE: ACUTE PAIN

## 2024-11-12 ASSESSMENT — SOCIAL DETERMINANTS OF HEALTH (SDOH)
IN THE PAST 12 MONTHS, HAS THE ELECTRIC, GAS, OIL, OR WATER COMPANY THREATENED TO SHUT OFF SERVICE IN YOUR HOME?: NO
WITHIN THE PAST 12 MONTHS, YOU WORRIED THAT YOUR FOOD WOULD RUN OUT BEFORE YOU GOT THE MONEY TO BUY MORE: NEVER TRUE
WITHIN THE PAST 12 MONTHS, THE FOOD YOU BOUGHT JUST DIDN'T LAST AND YOU DIDN'T HAVE MONEY TO GET MORE: NEVER TRUE

## 2024-11-12 NOTE — ASSESSMENT & PLAN NOTE
Navarrete catheter placed prior to transfer from [address] with 2 L of urine immediately drained  She has been on narcotics for recent fracture  Due to urinary retention as well as constipation there is concern for cauda equina syndrome or other neurologic abnormality following recent L1 burst fracture - RULED OUT

## 2024-11-12 NOTE — PROGRESS NOTES
4 Eyes Skin Assessment Completed by MÓNICA Garcia and MÓNICA Lopez.    Head WDL  Ears WDL  Nose WDL  Mouth WDL  Neck WDL  Breast/Chest WDL  Shoulder Blades WDL  Spine WDL  (R) Arm/Elbow/Hand Redness and Blanching  (L) Arm/Elbow/Hand WDL  Abdomen Bruising  Groin WDL  Scrotum/Coccyx/Buttocks Redness, Blanching, and Excoriation  (R) Leg Redness and Blanching to right hip  (L) Leg WDL  (R) Heel/Foot/Toe Redness and Blanching  (L) Heel/Foot/Toe Redness and Blanching          Devices In Places Blood Pressure Cuff, Pulse Ox, and Nasal Cannula      Interventions In Place Gray Ear Foams, TAP System, and Pillows; mepilex applied to bilateral elbows, bilateral heels, and right hip    Possible Skin Injury yes      Pictures Uploaded Into Epic Yes  Wound Consult Placed Yes  RN Wound Prevention Protocol Ordered No

## 2024-11-12 NOTE — ED PROVIDER NOTES
ED Provider Note    CHIEF COMPLAINT  Chief Complaint   Patient presents with    Back Pain     BIB from [place], reports lower back pain since patient had a fall last 10/24. Patient has L1 compression fracture. Patient also states having abd distention and constipation for 1 week. Patient sodium is 119 and CT result showed cholecystitis, here for further work-up. On 02 at 3 LPM baseline, with garcia cath. Sats: 91%        EXTERNAL RECORDS REVIEWED  Other I reviewed the transfer notes from [facility].    HPI/ROS    Bobbi Apolonia Rousseau is a 84 y.o. female who presents with low back pain.  The patient states she had a fall on 24 October suffering a compression fracture in her back.  She went to a skilled nursing care facility.  This was after she was admitted to the hospital for about a week.  At the skilled nursing facility she start developing abdominal distention as well as constipation.  She presented back to the emergency department today with increased pain in the back as well as throughout the abdomen.  The patient states she has not had any vomiting.  She has not recognize any change in urinary habits but she was found to have some urinary tension and a Garcia catheter was placed.  She states she is continue of severe low back pain from her compression fracture with no radicular component.  She does not have any paresthesias nor functional loss of her lower extremities.  As for the abdominal pain she has not had any vomiting or fevers.  She has had some diffuse abdominal discomfort as well as suspected constipation.    PAST MEDICAL HISTORY   has a past medical history of Arthritis (7-), Breath shortness (7-), CATARACT, Diabetes, Heart burn, High cholesterol, Hypertension, Pain, and Sleep apnea.    SURGICAL HISTORY   has a past surgical history that includes component removal (5/19/2009); other (3/06); other (9/03); knee revision total (8/14/08); knee revision total (8/10/2009); hip  "arthroplasty mis total (10/2016); and knee revision total (Left, 7/27/2017).    FAMILY HISTORY  History reviewed. No pertinent family history.    SOCIAL HISTORY  Social History     Tobacco Use    Smoking status: Never    Smokeless tobacco: Never   Substance and Sexual Activity    Alcohol use: Yes     Comment: rarely; 1 per month    Drug use: No    Sexual activity: Not on file       CURRENT MEDICATIONS  Home Medications       Reviewed by Josiane Martinez R.N. (Registered Nurse) on 11/12/24 at 0531  Med List Status: Not Addressed     Medication Last Dose Status   amlodipine (NORVASC) 10 MG Tab  Active   aspirin EC (ECOTRIN) 81 MG Tablet Delayed Response  Active   CALCIUM PO  Active   FUROSEMIDE 40 MG TABS  Active   gabapentin (NEURONTIN) 100 MG Cap  Active   guaifenesin LA (MUCINEX) 600 MG TABLET SR 12 HR  Active   insulin glargine (LANTUS) 100 UNIT/ML Solution  Active   insulin regular (NOVOLIN R) 100 Unit/mL Solution  Active   levothyroxine (SYNTHROID) 25 MCG Tab  Active   losartan (COZAAR) 100 MG Tab  Active   METFORMIN 1000 MG tablet  Active   MULTIPLE VITAMIN PO  Active   NEXIUM 40 MG CPDR  Active   oxyCODONE CR (OXYCONTIN) 10 MG Tablet Extended Release 12 hour Abuse-Deterrent  Active   simvastatin (ZOCOR) 20 MG Tab  Active   VESICARE 5 MG TABS  Active   ZOLPIDEM 12.5 MG CR tablet  Active                    ALLERGIES  No Known Allergies    PHYSICAL EXAM  VITAL SIGNS: BP (!) 148/75   Pulse (!) 104   Resp 20   Ht 1.753 m (5' 9\")   Wt 87.5 kg (193 lb)   SpO2 91%   BMI 28.50 kg/m²    In general the patient appears chronically ill    HEENT unremarkable    Pulmonary the patient's lungs are clear to auscultation bilaterally    Cardiovascular S1-S2 with a slightly tachycardic rate    GI the patient has diffuse tenderness with some distention    Back examination the patient has lower lumbar discomfort in the midline    Extremities atraumatic    Neurologic examination the patient has good motor strength " throughout and sensations in tact.  Overall no notable deficits      COURSE & MEDICAL DECISION MAKING    This is an 84-year-old female who presents as a transfer after workup at the transferring facility found potential acute cholecystitis based on CT imaging.  The patient also had significant urinary bladder distention and a Navarrete catheter was placed.  The patient had hyponatremia and secondary to the urinary tension there concern for potential neurologic etiology with the recent lumbar compression fracture.  She was noted to have a normal lactate as well as a normal white blood cell count.  She was treated with Rocephin and Flagyl and a Navarrete catheter was placed and had 2 L of output.  She does have evidence of urinary tract infection which should be adequately treated with the Rocephin.  She was transferred for higher level of care due to the urinary retention as well as the L1 burst fracture to make sure there is no evidence of neurologic compromise with an MRI and possible neurosurgical consultation.  The patient clinically does not appear septic at this time.  I will repeat the metabolic panel secondary to the hyponatremia and an MRI will be ordered of the lumbar spine.  The patient will be admitted to the hospitalist for further workup and treatment and neurosurgery will be consulted as needed based on the MRI.  As for the possible cholecystitis her abdomen is nonsurgical at this time.  She will be treated medically until she can be cleared at which time general surgery will will need to be consulted if she does not have significant improvement.    In further review of the patient's transfer records it was noted she had a white blood cell count of 17,700 but does not have a transaminitis nor any elevation of the alkaline phosphatase or bilirubin.  Therefore I will order an ultrasound to further characterize the potential acute cholecystitis.    FINAL DIAGNOSIS  1.  Subacute L1 burst fracture  2.  Urinary  retention  3.  Urinary tract infection  4.  Questionable acute cholecystitis    Disposition  The patient will be admitted in stable condition     Electronically signed by: Daniele De Leon M.D., 11/12/2024 5:57 AM

## 2024-11-12 NOTE — ASSESSMENT & PLAN NOTE
Continue losartan  Vitals:    11/15/24 1623   BP: 130/61   Pulse: 80   Resp: 20   Temp: 36.5 °C (97.7 °F)   SpO2: 97%     Stable

## 2024-11-12 NOTE — THERAPY
Occupational Therapy Contact Note    Patient Name: Bobbi Rousseau  Age:  84 y.o., Sex:  female  Medical Record #: [id number]  Today's Date: 11/12/2024    Discussed missed therapy with RN       11/12/24 0230   Interdisciplinary Plan of Care Collaboration   Collaboration Comments OT referral received. Pt is awaiting spine MRI and definitive POC. Hold OT and continue to monitor.

## 2024-11-12 NOTE — ED NOTES
Report rec'd from MÓNICA Kc.  Pt assisted to reposition in [facility], transport at bedside for transfer of pt to [facility].  All belongings with pt.

## 2024-11-12 NOTE — ASSESSMENT & PLAN NOTE
Patient fell and admitted at [facility] from 10/24 - 10/29 following CT scan showing L1 burst fracture  She was discharged to rehab and has been having persistent constipation and also found to have urinary retention  Lumbar MRI noted moderate canal narrowing at L4-L5 with moderate left lateral recess stenosis.  Superior endplate compression fracture at L1 with extensive bone marrow edema and a fluid filled vertebral body collapse subjacent to the superior endplate.  Approximately about 30% loss of height with minimal posterior superior cortical retropulsion without cauda equina compression.  Continue narcotic pain management  PT, OT

## 2024-11-12 NOTE — PROGRESS NOTES
"Hospital Medicine Daily Progress Note    Date of Service  11/12/2024    Chief Complaint  Bobbi Rousseau is a 84 y.o. female admitted 11/12/2024 with constipation, urinary retention.     Hospital Course  Bobbi Rousseau is a 84 y.o. female who was transferred from [facility] 11/12/2024 with constipation, urinary retention.  PMH of insulin-dependent diabetes, hypertension, hypothyroid, chronic hypoxemic respiratory failure on 2-3 L O2 baseline.  She was recently admitted at [facility] from 10/24 - 10/29 following a ground-level fall.  CT scan at the time showed an L1 burst fracture, she was discharged to rehab.  She was sent to [facility] ED from the rehab facility following constipation for 6 days despite laxatives and stool softeners.  She has been taking narcotics this entire time for her L1 fracture.     CT abdomen/pelvis with contrast was done which showed \"L1 burst fracture with 4 mm retropulsion... Diffuse gallbladder wall thickening with a dilated gallbladder measuring 5.4 cm in diameter and 0.1 cm in length.  Prominent adjacent inflammatory changes noted.  Findings are favored to relate to cholecystitis.  No evidence of gallbladder perforation or abscess.... Urinary bladder distention. \"     A Navarrete catheter was placed with 2 L of urine immediately drained.  UA was done which was infectious appearing.  Due to CT findings and UA findings she was started on ceftriaxone and metronidazole, transferred for MRI.     On my evaluation afebrile, hemodynamically stable, on baseline oxygen requirements.  Labs show WC count of 15, sodium 125, potassium 3.5, negative LFTs except for alk phos of 158.       Interval Problem Update  11/12 afebrile, vitals stable and on 4 L nasal cannula.  White count is slightly elevated at 15 with a left shift-continue IV antibiotics.  Patient is complaining of nausea, added Zofran.  MRI pending.  PT/OT pending.    I have discussed this patient's plan of care and discharge " plan at IDT rounds today with Case Management, Nursing, Nursing leadership, and other members of the IDT team.    Consultants/Specialty  None    Code Status  DNAR/DNI    Disposition  The patient is not medically cleared for discharge to home or a post-acute facility.  Anticipate discharge to: skilled nursing facility    I have placed the appropriate orders for post-discharge needs.    Review of Systems  Review of Systems   Constitutional:  Negative for chills, fever and malaise/fatigue.   Respiratory:  Negative for cough and shortness of breath.    Cardiovascular:  Negative for chest pain, palpitations and leg swelling.   Gastrointestinal:  Negative for abdominal pain, diarrhea, heartburn, nausea and vomiting.   Genitourinary:  Negative for dysuria, frequency and urgency.   Musculoskeletal:  Positive for back pain.   Neurological:  Negative for dizziness and headaches.   All other systems reviewed and are negative.       Physical Exam  Temp:  [36.4 °C (97.5 °F)] 36.4 °C (97.5 °F)  Pulse:  [100-104] 101  Resp:  [18-20] 18  BP: (144-148)/() 144/109  SpO2:  [89 %-93 %] 93 %    Physical Exam  Vitals and nursing note reviewed.   Constitutional:       Appearance: Normal appearance. She is not ill-appearing.   HENT:      Head: Normocephalic and atraumatic.      Jaw: There is normal jaw occlusion.      Right Ear: Hearing normal.      Left Ear: Hearing normal.      Nose: Nose normal.      Mouth/Throat:      Lips: Pink.      Mouth: Mucous membranes are moist.   Eyes:      Extraocular Movements: Extraocular movements intact.      Conjunctiva/sclera: Conjunctivae normal.      Pupils: Pupils are equal, round, and reactive to light.   Neck:      Vascular: No carotid bruit.   Cardiovascular:      Rate and Rhythm: Normal rate and regular rhythm.      Pulses: Normal pulses.      Heart sounds: Normal heart sounds, S1 normal and S2 normal.   Pulmonary:      Effort: Pulmonary effort is normal.      Breath sounds: Normal breath  sounds and air entry. No stridor.   Abdominal:      General: Bowel sounds are normal.      Palpations: Abdomen is soft.      Tenderness: There is no abdominal tenderness.   Musculoskeletal:      Cervical back: Normal range of motion and neck supple.      Right lower leg: No edema.      Left lower leg: No edema.   Skin:     General: Skin is warm and dry.      Capillary Refill: Capillary refill takes less than 2 seconds.   Neurological:      General: No focal deficit present.      Mental Status: She is alert and oriented to person, place, and time. Mental status is at baseline.      Sensory: Sensation is intact.      Motor: Motor function is intact.   Psychiatric:         Attention and Perception: Attention and perception normal.         Mood and Affect: Mood and affect normal.         Speech: Speech normal.         Behavior: Behavior normal. Behavior is cooperative.         Fluids    Intake/Output Summary (Last 24 hours) at 11/12/2024 0835  Last data filed at 11/12/2024 0729  Gross per 24 hour   Intake --   Output 700 ml   Net -700 ml        Laboratory  Recent Labs     11/12/24  0536   WBC 15.0*   RBC 4.91   HEMOGLOBIN 14.4   HEMATOCRIT 42.7   MCV 87.0   MCH 29.3   MCHC 33.7   RDW 42.5   PLATELETCT 400   MPV 9.7     Recent Labs     11/12/24  0536   SODIUM 125*   POTASSIUM 3.5*   CHLORIDE 83*   CO2 27   GLUCOSE 201*   BUN 19   CREATININE 0.73   CALCIUM 9.3                   Imaging  US-RUQ   Final Result         1.  Cholelithiasis and gallbladder sludge with thickened gallbladder wall, sonographically compatible with cholecystitis.   2.  Hepatomegaly   3.  Echogenic liver, compatible with fatty change versus fibrosis.   4.  Echogenic right kidney, nonspecific but can be associated with medical renal disease.      CT-OUTSIDE IMAGES-CT ABDOMEN/PELVIS   Final Result      CT-OUTSIDE IMAGES-CT LUMBAR SPINE   Final Result      CT-OUTSIDE IMAGES-CT CHEST/ABDOMEN/PELVIS   Final Result      MR-LUMBAR SPINE-W/O    (Results  Pending)        Assessment/Plan  * Urinary retention- (present on admission)  Assessment & Plan  Navarrete catheter placed prior to transfer from [place] with 2 L of urine immediately drained  She has been on narcotics for recent fracture  Due to urinary retention as well as constipation there is concern for cauda equina syndrome or other neurologic abnormality following recent L1 burst fracture  MRI ordered    Closed stable burst fracture of first lumbar vertebra (HCC)- (present on admission)  Assessment & Plan  Patient fell and admitted at [facility] from 10/24 - 10/29 following CT scan showing L1 burst fracture  She was discharged to rehab and has been having persistent constipation and also found to have urinary retention  MRI ordered for further evaluation  Continue narcotic pain management  PT, OT    Constipation- (present on admission)  Assessment & Plan  She has been on narcotics for recent L1 fracture.  Bowel protocol  MRI ordered for further evaluation of lumbar spine    Acute cystitis without hematuria- (present on admission)  Assessment & Plan  UA outside facility showed moderate bacteria started on ceftriaxone and metronidazole prior to transfer  Day team to follow-up with UA cultures from outside facility.  Repeat UA here ordered  We will continue with IV cefazolin    Hyponatremia- (present on admission)  Assessment & Plan  Unclear etiology, she does appear euvolemic.  Slow rate IV fluids  Urine studies ordered  BMP ordered every 8 to continue monitoring and avoid overcorrection    Primary hypertension- (present on admission)  Assessment & Plan  Continue losartan    Type 2 diabetes mellitus with hyperglycemia, with long-term current use of insulin ([facility])- (present on admission)  Assessment & Plan  Continue glargine, sliding scale added while in the hospital.  Hold home orals    Dyslipidemia- (present on admission)  Assessment & Plan  Continue statin    Acquired hypothyroidism- (present on admission)  Assessment  & Plan  Continue levothyroxine         VTE prophylaxis:   SCDs/TEDs   enoxaparin ppx      I have performed a physical exam and reviewed and updated ROS and Plan today (11/12/2024). In review of yesterday's note (11/11/2024), there are no changes except as documented above.

## 2024-11-12 NOTE — CARE PLAN
Problem: Pain - Standard  Goal: Alleviation of pain or a reduction in pain to the patient’s comfort goal  Description: Target End Date:  Prior to discharge or change in level of care    Document on Vitals flowsheet    1.  Document pain using the appropriate pain scale per order or unit policy  2.  Educate and implement non-pharmacologic comfort measures (i.e. relaxation, distraction, massage, cold/heat therapy, etc.)  3.  Pain management medications as ordered  4.  Reassess pain after pain med administration per policy  5.  If opiods administered assess patient's response to pain medication is appropriate per POSS sedation scale  6.  Follow pain management plan developed in collaboration with patient and interdisciplinary team (including palliative care or pain specialists if applicable)  Outcome: Progressing     Problem: Knowledge Deficit - Standard  Goal: Patient and family/care givers will demonstrate understanding of plan of care, disease process/condition, diagnostic tests and medications  Description: Target End Date:  1-3 days or as soon as patient condition allows    Document in Patient Education    1.  Patient and family/caregiver oriented to unit, equipment, visitation policy and means for communicating concern  2.  Complete/review Learning Assessment  3.  Assess knowledge level of disease process/condition, treatment plan, diagnostic tests and medications  4.  Explain disease process/condition, treatment plan, diagnostic tests and medications  Outcome: Progressing     Problem: Fall Risk  Goal: Patient will remain free from falls  Description: Target End Date:  Prior to discharge or change in level of care    Document interventions on the Lily Teixeira Fall Risk Assessment    1.  Assess for fall risk factors  2.  Implement fall precautions  Outcome: Progressing   The patient is Stable - Low risk of patient condition declining or worsening    Shift Goals  Clinical Goals: MRI, NPO, antibiotics  Patient  Goals: sleep, pain control  Family Goals: n/a

## 2024-11-12 NOTE — ASSESSMENT & PLAN NOTE
UA outside facility showed moderate bacteria started on ceftriaxone and metronidazole prior to transfer  Day team to follow-up with UA cultures from outside facility.  Repeat UA here ordered  Treated with IV abx

## 2024-11-12 NOTE — HOSPITAL COURSE
"This is an 84-year-old female with past medical history of hypertension, dyslipidemia, type 2 diabetes, hypothyroidism, chronic hypoxemic respiratory failure on 2 to 3 L nocturnal, constipation and history of urinary retention who was admitted on 11/12/2024 with worsening back pain.    She was recently admitted at [facility] from 10/24 - 10/29 following a ground-level fall.  CT scan at the time showed an L1 burst fracture, she was discharged to rehab.    CT abdomen/pelvis with contrast was done which showed \"L1 burst fracture with 4 mm retropulsion... Diffuse gallbladder wall thickening with a dilated gallbladder measuring 5.4 cm in diameter and 0.1 cm in length.  Prominent adjacent inflammatory changes noted.  Findings are favored to relate to cholecystitis.  No evidence of gallbladder perforation or abscess.... Urinary bladder distention. \"  RUQ US -noted cholelithiasis, gallbladder sludge, thickened gallbladder, sonographic signs compatible with cholecystitis.  Patient started on Rocephin and Flagyl.     Urinalysis significant for UTI, patient with significan urinary retention requiring Navarrete catheter placement. Started on Rocephin.    Lumbar MRI noted moderate canal narrowing at L4-L5 with moderate left lateral recess stenosis.  Superior endplate compression fracture at L1 with extensive bone marrow edema and a fluid filled vertebral body collapse subjacent to the superior endplate.  Approximately about 30% loss of height with minimal posterior superior cortical retropulsion without cauda equina compression.    On 11/13 AM, patient noted to have new onset atrial fibrillation with RVR.  Started on metoprolol, echocardiogram pending.  "

## 2024-11-12 NOTE — H&P
"Hospital Medicine History & Physical Note    Date of Service  11/12/2024    Primary Care Physician  Gabriel Liu M.D.    Code Status  DNAR/DNI    Chief Complaint  Chief Complaint   Patient presents with    Back Pain     BIB from [place], reports lower back pain since patient had a fall last 10/24. Patient has L1 compression fracture. Patient also states having abd distention and constipation for 1 week. Patient sodium is 119 and CT result showed cholecystitis, here for further work-up. On 02 at 3 LPM baseline, with garcia cath. Sats: 91%        History of Presenting Illness  Bobbi Rousseau is a 84 y.o. female who was transferred from [place] 11/12/2024 with constipation, urinary retention.  PMH of insulin-dependent diabetes, hypertension, hypothyroid, chronic hypoxemic respiratory failure on 2-3 L O2 baseline.  She was recently admitted at [facility] from 10/24 - 10/29 following a ground-level fall.  CT scan at the time showed an L1 burst fracture, she was discharged to rehab.  She was sent to [facility] ED from the rehab facility following constipation for 6 days despite laxatives and stool softeners.  She has been taking narcotics this entire time for her L1 fracture.    CT abdomen/pelvis with contrast was done which showed \"L1 burst fracture with 4 mm retropulsion... Diffuse gallbladder wall thickening with a dilated gallbladder measuring 5.4 cm in diameter and 0.1 cm in length.  Prominent adjacent inflammatory changes noted.  Findings are favored to relate to cholecystitis.  No evidence of gallbladder perforation or abscess.... Urinary bladder distention. \"    A Garcia catheter was placed with 2 L of urine immediately drained.  UA was done which was infectious appearing.  Due to CT findings and UA findings she was started on ceftriaxone and metronidazole, transferred for MRI.    On my evaluation afebrile, hemodynamically stable, on baseline oxygen requirements.  Labs show WC count of 15, sodium 125, " potassium 3.5, negative LFTs except for alk phos of 158.    I discussed the plan of care with patient, bedside RN, and edp .    Review of Systems  Review of Systems   Constitutional:  Negative for chills and fever.   Respiratory:  Negative for cough and shortness of breath.    Cardiovascular:  Negative for chest pain.   Gastrointestinal:  Negative for abdominal pain, diarrhea, nausea and vomiting.   Genitourinary:  Negative for dysuria and urgency.       Past Medical History   has a past medical history of Arthritis (7-), Breath shortness (7-), CATARACT, Diabetes, Heart burn, High cholesterol, Hypertension, Pain, and Sleep apnea.    Surgical History   has a past surgical history that includes component removal (5/19/2009); other (3/06); other (9/03); knee revision total (8/14/08); knee revision total (8/10/2009); hip arthroplasty mis total (10/2016); and knee revision total (Left, 7/27/2017).     Family History  Family history reviewed with patient. There is no family history that is pertinent to the chief complaint.     Social History   reports that she has never smoked. She has never used smokeless tobacco. She reports current alcohol use. She reports that she does not use drugs.    Allergies  No Known Allergies    Medications  Prior to Admission Medications   Prescriptions Last Dose Informant Patient Reported? Taking?   CALCIUM PO  Patient Yes No   Sig: Take 1 Tab by mouth every day.   FUROSEMIDE 40 MG TABS  Patient Yes No   Sig: Take 40 mg by mouth 1 time daily as needed.   METFORMIN 1000 MG tablet  Patient Yes No   Sig: Take 1,000 mg by mouth 2 times daily, before breakfast and dinner.   MULTIPLE VITAMIN PO  Patient Yes No   Sig: Take 1 Tab by mouth every day.   NEXIUM 40 MG CPDR  Patient Yes No   Sig: Take 40 mg by mouth every day.   VESICARE 5 MG TABS  Patient Yes No   Sig: Take 5 mg by mouth every day.   ZOLPIDEM 12.5 MG CR tablet  Patient Yes No   Sig: Take 12.5 mg by mouth at bedtime as  needed.   amlodipine (NORVASC) 10 MG Tab  Patient Yes No   Sig: Take 10 mg by mouth every day.   aspirin EC (ECOTRIN) 81 MG Tablet Delayed Response   No No   Sig: Take 1 Tab by mouth 2 Times a Day.   gabapentin (NEURONTIN) 100 MG Cap  Patient Yes No   Sig: Take 100 mg by mouth 4 times a day.   guaifenesin LA (MUCINEX) 600 MG TABLET SR 12 HR  Patient Yes No   Sig: Take 600 mg by mouth 2 Times a Day.   insulin glargine (LANTUS) 100 UNIT/ML Solution  Patient Yes No   Sig: Inject 58 Units as instructed every evening.   insulin regular (NOVOLIN R) 100 Unit/mL Solution  Patient Yes No   Sig: Inject 12 Units as instructed every day. morning   levothyroxine (SYNTHROID) 25 MCG Tab  Patient Yes No   Sig: Take 25 mcg by mouth Every morning on an empty stomach.   losartan (COZAAR) 100 MG Tab  Patient Yes No   Sig: Take 100 mg by mouth every day.   oxyCODONE CR (OXYCONTIN) 10 MG Tablet Extended Release 12 hour Abuse-Deterrent  Patient Yes No   Sig: Take 10 mg by mouth 4 times a day as needed.   simvastatin (ZOCOR) 20 MG Tab  Patient Yes No   Sig: Take 20 mg by mouth every evening.      Facility-Administered Medications: None       Physical Exam  Temp:  [36.4 °C (97.5 °F)] 36.4 °C (97.5 °F)  Pulse:  [100-104] 100  Resp:  [18-20] 20  BP: (148)/(75) 148/75  SpO2:  [89 %-93 %] 93 %  Blood Pressure : (!) 148/75   Temperature: 36.4 °C (97.5 °F)   Pulse: (!) 104   Respiration: 18   Pulse Oximetry: 89 %       Physical Exam  HENT:      Head: Normocephalic and atraumatic.      Mouth/Throat:      Mouth: Mucous membranes are moist.      Pharynx: No oropharyngeal exudate or posterior oropharyngeal erythema.   Cardiovascular:      Rate and Rhythm: Normal rate and regular rhythm.      Pulses: Normal pulses.      Heart sounds: Normal heart sounds. No murmur heard.  Pulmonary:      Effort: Pulmonary effort is normal. No respiratory distress.      Breath sounds: Normal breath sounds.   Abdominal:      General: There is distension.       "Tenderness: There is no abdominal tenderness.   Musculoskeletal:         General: No swelling or tenderness. Normal range of motion.   Skin:     General: Skin is warm and dry.   Neurological:      Mental Status: She is alert and oriented to person, place, and time.         Laboratory:  Recent Labs     11/12/24  0536   WBC 15.0*   RBC 4.91   HEMOGLOBIN 14.4   HEMATOCRIT 42.7   MCV 87.0   MCH 29.3   MCHC 33.7   RDW 42.5   PLATELETCT 400   MPV 9.7     Recent Labs     11/12/24  0536   SODIUM 125*   POTASSIUM 3.5*   CHLORIDE 83*   CO2 27   GLUCOSE 201*   BUN 19   CREATININE 0.73   CALCIUM 9.3     Recent Labs     11/12/24  0536   ALTSGPT 12   ASTSGOT 17   ALKPHOSPHAT 158*   TBILIRUBIN 0.8   GLUCOSE 201*         No results for input(s): \"NTPROBNP\" in the last 72 hours.      No results for input(s): \"TROPONINT\" in the last 72 hours.    Imaging:  CT-OUTSIDE IMAGES-CT ABDOMEN/PELVIS   Final Result      CT-OUTSIDE IMAGES-CT LUMBAR SPINE   Final Result      CT-OUTSIDE IMAGES-CT CHEST/ABDOMEN/PELVIS   Final Result      MR-LUMBAR SPINE-W/O    (Results Pending)   US-RUQ    (Results Pending)     Assessment/Plan:  Justification for Admission Status  I anticipate this patient will require at least two midnights for appropriate medical management, necessitating inpatient admission because urinary retention, possible from recent fall and needs MRI    * Urinary retention- (present on admission)  Assessment & Plan  Navarrete catheter placed prior to transfer from [place] with 2 L of urine immediately drained  She has been on narcotics for recent fracture  Due to urinary retention as well as constipation there is concern for cauda equina syndrome or other neurologic abnormality following recent L1 burst fracture  MRI ordered    Hyponatremia- (present on admission)  Assessment & Plan  Unclear etiology, she does appear euvolemic.  Slow rate IV fluids  Urine studies ordered  BMP ordered every 8 to continue monitoring and avoid " overcorrection    Acute cystitis without hematuria- (present on admission)  Assessment & Plan  UA outside facility showed moderate bacteria started on ceftriaxone and metronidazole prior to transfer  Day team to follow-up with UA cultures from outside facility.  Repeat UA here ordered  We will continue with IV cefazolin    Constipation- (present on admission)  Assessment & Plan  She has been on narcotics for recent L1 fracture.  Bowel protocol  MRI ordered for further evaluation of lumbar spine    Type 2 diabetes mellitus with hyperglycemia, with long-term current use of insulin (HCC)- (present on admission)  Assessment & Plan  Continue glargine, sliding scale added while in the hospital.  Hold home orals    Closed stable burst fracture of first lumbar vertebra (HCC)- (present on admission)  Assessment & Plan  Patient fell and admitted at [facility] from 10/24 - 10/29 following CT scan showing L1 burst fracture  She was discharged to rehab and has been having persistent constipation and also found to have urinary retention  MRI ordered for further evaluation  Continue narcotic pain management  PT, OT    Dyslipidemia- (present on admission)  Assessment & Plan  Continue statin    Primary hypertension- (present on admission)  Assessment & Plan  Continue losartan    Acquired hypothyroidism- (present on admission)  Assessment & Plan  Continue levothyroxine        VTE prophylaxis: enoxaparin ppx

## 2024-11-12 NOTE — ASSESSMENT & PLAN NOTE
Continue glargine, sliding scale added while in the hospital.  Hold home orals  A1c 8.1 slioghtly worse than prior  On insulin

## 2024-11-12 NOTE — ASSESSMENT & PLAN NOTE
119 at outside facility  Unclear etiology, she does appear euvolemic.  Slow rate IV fluids  Urine studies ordered  BMP ordered every 8 to continue monitoring and avoid overcorrection    Recent Labs     11/13/24  1543 11/14/24  0137 11/15/24  1233   SODIUM 128* 129* 130*   POTASSIUM 3.6 3.4* 3.5*   CHLORIDE 91* 93* 93*   CO2 25 25 26   GLUCOSE 208* 169* 183*   BUN 12 11 9   CREATININE 0.65 0.59 0.66     Not sure re: rate of rise of sodium levels at the OSH, stable here.

## 2024-11-12 NOTE — DISCHARGE PLANNING
Care Transition Team Assessment    RNCM met with patient at the bedside.  Patient A&Ox4 and able to verify the information on the face sheet.  Pt lives in a single story home with 3 steps to enter.  Pt has good support through her spouse, Pietro.  Pietro is able to provide transportation.  Pt has an AD at home.  Pt does not have any financial concerns.  Pt has medicare and .  Pt's PCP is Gabriel Liu MD.  Pt uses the pharmacy at the hospitals in [place].  Pt was a [facility] prior to hospitalization.  Pt states she has been there for the past week.  If patient needs to return to SNF, she prefers to go back there.  Pt declined having this RNCM send referrals to [place] SNFs at this time.  Pt uses 2-3L oxygen at night, baseline.  Pt thinks her DME O2 provider is [facility].  Pt denies substance abuse or mental health concerns.  [facility]# [id number]    Information Source  Orientation Level: Oriented X4  Information Given By: Patient  Who is responsible for making decisions for patient? : Patient    Readmission Evaluation  Is this a readmission?: No    Elopement Risk  Legal Hold: No  Ambulatory or Self Mobile in Wheelchair: No-Not an Elopement Risk         Discharge Preparedness  What is your plan after discharge?: Skilled nursing facility  What are your discharge supports?: Spouse  Prior Functional Level: Needs Assist with Activities of Daily Living  Difficulity with ADLs: Bathing, Dressing, Toileting, Walking  Difficulity with IADLs: Driving, Laundry, Shopping    Functional Assesment  Prior Functional Level: Needs Assist with Activities of Daily Living    Finances  Financial Barriers to Discharge: No  Prescription Coverage: Yes              Advance Directive  Advance Directive?:  (Pt states she has an AD at home)    Domestic Abuse  Have you ever been the victim of abuse or violence?: No    Psychological Assessment  History of Substance Abuse: None  History of Psychiatric Problems: No  Non-compliant  with Treatment: No  Newly Diagnosed Illness: Yes    Discharge Risks or Barriers  Discharge risks or barriers?: Complex medical needs  Patient risk factors: Complex medical needs    Anticipated Discharge Information  Discharge Disposition: D/T to SNF with Medicare cert in anticipation of skilled care (03)

## 2024-11-12 NOTE — PROGRESS NOTES
Pharmacy Medication Reconciliation      ~Medication reconciliation updated and complete per patient   ~Allergies have been verified and updated   ~No oral ABX within the last 30 days  ~Patient home pharmacy :  [facility] -[phone]      ~Anticoagulants (rivaroxaban, apixaban, edoxaban, dabigatran, warfarin, enoxaparin) taken in the last 14 days? No

## 2024-11-12 NOTE — PROGRESS NOTES
I will be off duty and signed out of voalte at 3pm.  If you have any needs for this patient after 3pm, please reach out to the on call Evelyn HAGEN.  Thank you!

## 2024-11-12 NOTE — ED TRIAGE NOTES
"Chief Complaint   Patient presents with    Back Pain     BIB from [place], reports lower back pain since patient had a fall last 10/24. Patient has L1 compression fracture. Patient also states having abd distention and constipation for 1 week. Patient sodium is 119 and CT result showed cholecystitis, here for further work-up. On 02 at 3 LPM baseline, with garcia cath. Sats: 91%      Patient garcia catheter inserted today from [place]. -at 500 ml level    BP (!) 148/75   Pulse (!) 104   Resp 20   Ht 1.753 m (5' 9\")   Wt 87.5 kg (193 lb)   SpO2 91%   BMI 28.50 kg/m²     Pt is alert and oriented x 4, speaking in full sentences, follows commands and responds appropriately to questions.     Respirations are even and unlabored.      "

## 2024-11-12 NOTE — PROGRESS NOTES
Waiting for RN from [facility] [phone] to return my call regarding unreadable MAR's so I can complete the Med Rec

## 2024-11-13 ENCOUNTER — APPOINTMENT (OUTPATIENT)
Dept: RADIOLOGY | Facility: MEDICAL CENTER | Age: 84
DRG: 418 | End: 2024-11-13
Attending: GENERAL PRACTICE
Payer: MEDICARE

## 2024-11-13 PROBLEM — R10.84 GENERALIZED ABDOMINAL PAIN: Status: ACTIVE | Noted: 2024-11-13

## 2024-11-13 PROBLEM — I48.91 ATRIAL FIBRILLATION WITH RVR (HCC): Status: ACTIVE | Noted: 2024-11-13

## 2024-11-13 PROBLEM — E83.39 HYPOPHOSPHATEMIA: Status: ACTIVE | Noted: 2024-11-13

## 2024-11-13 LAB
ALBUMIN SERPL BCP-MCNC: 2.9 G/DL (ref 3.2–4.9)
ALBUMIN/GLOB SERPL: 0.8 G/DL
ALP SERPL-CCNC: 208 U/L (ref 30–99)
ALT SERPL-CCNC: 7 U/L (ref 2–50)
ANION GAP SERPL CALC-SCNC: 12 MMOL/L (ref 7–16)
ANION GAP SERPL CALC-SCNC: 14 MMOL/L (ref 7–16)
AST SERPL-CCNC: 19 U/L (ref 12–45)
BASOPHILS # BLD AUTO: 0.2 % (ref 0–1.8)
BASOPHILS # BLD: 0.02 K/UL (ref 0–0.12)
BILIRUB SERPL-MCNC: 0.4 MG/DL (ref 0.1–1.5)
BUN SERPL-MCNC: 12 MG/DL (ref 8–22)
BUN SERPL-MCNC: 13 MG/DL (ref 8–22)
CALCIUM ALBUM COR SERPL-MCNC: 9.9 MG/DL (ref 8.5–10.5)
CALCIUM SERPL-MCNC: 8.8 MG/DL (ref 8.5–10.5)
CALCIUM SERPL-MCNC: 9 MG/DL (ref 8.5–10.5)
CFT BLD TEG: 5.2 MIN (ref 4.6–9.1)
CFT P HPASE BLD TEG: 4.9 MIN (ref 4.3–8.3)
CHLORIDE SERPL-SCNC: 86 MMOL/L (ref 96–112)
CHLORIDE SERPL-SCNC: 91 MMOL/L (ref 96–112)
CLOT ANGLE BLD TEG: 79.2 DEGREES (ref 63–78)
CO2 SERPL-SCNC: 25 MMOL/L (ref 20–33)
CO2 SERPL-SCNC: 26 MMOL/L (ref 20–33)
CREAT SERPL-MCNC: 0.65 MG/DL (ref 0.5–1.4)
CREAT SERPL-MCNC: 0.76 MG/DL (ref 0.5–1.4)
CT.EXTRINSIC BLD ROTEM: 0.8 MIN (ref 0.8–2.1)
EKG IMPRESSION: NORMAL
EOSINOPHIL # BLD AUTO: 0.06 K/UL (ref 0–0.51)
EOSINOPHIL NFR BLD: 0.5 % (ref 0–6.9)
ERYTHROCYTE [DISTWIDTH] IN BLOOD BY AUTOMATED COUNT: 43.6 FL (ref 35.9–50)
GFR SERPLBLD CREATININE-BSD FMLA CKD-EPI: 78 ML/MIN/1.73 M 2
GFR SERPLBLD CREATININE-BSD FMLA CKD-EPI: 88 ML/MIN/1.73 M 2
GLOBULIN SER CALC-MCNC: 3.5 G/DL (ref 1.9–3.5)
GLUCOSE BLD STRIP.AUTO-MCNC: 187 MG/DL (ref 65–99)
GLUCOSE BLD STRIP.AUTO-MCNC: 218 MG/DL (ref 65–99)
GLUCOSE BLD STRIP.AUTO-MCNC: 220 MG/DL (ref 65–99)
GLUCOSE BLD STRIP.AUTO-MCNC: 232 MG/DL (ref 65–99)
GLUCOSE SERPL-MCNC: 208 MG/DL (ref 65–99)
GLUCOSE SERPL-MCNC: 210 MG/DL (ref 65–99)
HCT VFR BLD AUTO: 41.2 % (ref 37–47)
HGB BLD-MCNC: 14 G/DL (ref 12–16)
IMM GRANULOCYTES # BLD AUTO: 0.07 K/UL (ref 0–0.11)
IMM GRANULOCYTES NFR BLD AUTO: 0.6 % (ref 0–0.9)
LYMPHOCYTES # BLD AUTO: 0.67 K/UL (ref 1–4.8)
LYMPHOCYTES NFR BLD: 5.3 % (ref 22–41)
MAGNESIUM SERPL-MCNC: 2.1 MG/DL (ref 1.5–2.5)
MCF BLD TEG: 68.1 MM (ref 52–69)
MCF.PLATELET INHIB BLD ROTEM: 41 MM (ref 15–32)
MCH RBC QN AUTO: 29.9 PG (ref 27–33)
MCHC RBC AUTO-ENTMCNC: 34 G/DL (ref 32.2–35.5)
MCV RBC AUTO: 87.8 FL (ref 81.4–97.8)
MONOCYTES # BLD AUTO: 0.68 K/UL (ref 0–0.85)
MONOCYTES NFR BLD AUTO: 5.4 % (ref 0–13.4)
NEUTROPHILS # BLD AUTO: 11.08 K/UL (ref 1.82–7.42)
NEUTROPHILS NFR BLD: 88 % (ref 44–72)
NRBC # BLD AUTO: 0 K/UL
NRBC BLD-RTO: 0 /100 WBC (ref 0–0.2)
PHOSPHATE SERPL-MCNC: 2.1 MG/DL (ref 2.5–4.5)
PLATELET # BLD AUTO: 384 K/UL (ref 164–446)
PMV BLD AUTO: 9.7 FL (ref 9–12.9)
POTASSIUM SERPL-SCNC: 3.6 MMOL/L (ref 3.6–5.5)
POTASSIUM SERPL-SCNC: 3.6 MMOL/L (ref 3.6–5.5)
PROT SERPL-MCNC: 6.4 G/DL (ref 6–8.2)
RBC # BLD AUTO: 4.69 M/UL (ref 4.2–5.4)
SODIUM SERPL-SCNC: 126 MMOL/L (ref 135–145)
SODIUM SERPL-SCNC: 128 MMOL/L (ref 135–145)
TEG ALGORITHM TGALG: ABNORMAL
WBC # BLD AUTO: 12.6 K/UL (ref 4.8–10.8)

## 2024-11-13 PROCEDURE — 36415 COLL VENOUS BLD VENIPUNCTURE: CPT

## 2024-11-13 PROCEDURE — 85576 BLOOD PLATELET AGGREGATION: CPT

## 2024-11-13 PROCEDURE — A9270 NON-COVERED ITEM OR SERVICE: HCPCS | Performed by: GENERAL PRACTICE

## 2024-11-13 PROCEDURE — 80048 BASIC METABOLIC PNL TOTAL CA: CPT

## 2024-11-13 PROCEDURE — 93010 ELECTROCARDIOGRAM REPORT: CPT | Performed by: INTERNAL MEDICINE

## 2024-11-13 PROCEDURE — 700102 HCHG RX REV CODE 250 W/ 637 OVERRIDE(OP): Performed by: GENERAL PRACTICE

## 2024-11-13 PROCEDURE — 99233 SBSQ HOSP IP/OBS HIGH 50: CPT | Performed by: GENERAL PRACTICE

## 2024-11-13 PROCEDURE — 700105 HCHG RX REV CODE 258

## 2024-11-13 PROCEDURE — 83735 ASSAY OF MAGNESIUM: CPT

## 2024-11-13 PROCEDURE — 700102 HCHG RX REV CODE 250 W/ 637 OVERRIDE(OP)

## 2024-11-13 PROCEDURE — A9270 NON-COVERED ITEM OR SERVICE: HCPCS

## 2024-11-13 PROCEDURE — 85384 FIBRINOGEN ACTIVITY: CPT

## 2024-11-13 PROCEDURE — 700105 HCHG RX REV CODE 258: Performed by: STUDENT IN AN ORGANIZED HEALTH CARE EDUCATION/TRAINING PROGRAM

## 2024-11-13 PROCEDURE — 99222 1ST HOSP IP/OBS MODERATE 55: CPT | Performed by: STUDENT IN AN ORGANIZED HEALTH CARE EDUCATION/TRAINING PROGRAM

## 2024-11-13 PROCEDURE — A9270 NON-COVERED ITEM OR SERVICE: HCPCS | Performed by: STUDENT IN AN ORGANIZED HEALTH CARE EDUCATION/TRAINING PROGRAM

## 2024-11-13 PROCEDURE — 700111 HCHG RX REV CODE 636 W/ 250 OVERRIDE (IP): Mod: JZ

## 2024-11-13 PROCEDURE — 700111 HCHG RX REV CODE 636 W/ 250 OVERRIDE (IP): Performed by: STUDENT IN AN ORGANIZED HEALTH CARE EDUCATION/TRAINING PROGRAM

## 2024-11-13 PROCEDURE — 71045 X-RAY EXAM CHEST 1 VIEW: CPT

## 2024-11-13 PROCEDURE — 82962 GLUCOSE BLOOD TEST: CPT | Mod: 91

## 2024-11-13 PROCEDURE — 93005 ELECTROCARDIOGRAM TRACING: CPT

## 2024-11-13 PROCEDURE — 700101 HCHG RX REV CODE 250: Performed by: GENERAL PRACTICE

## 2024-11-13 PROCEDURE — 85025 COMPLETE CBC W/AUTO DIFF WBC: CPT

## 2024-11-13 PROCEDURE — 700101 HCHG RX REV CODE 250

## 2024-11-13 PROCEDURE — 700105 HCHG RX REV CODE 258: Performed by: GENERAL PRACTICE

## 2024-11-13 PROCEDURE — 84100 ASSAY OF PHOSPHORUS: CPT

## 2024-11-13 PROCEDURE — 700102 HCHG RX REV CODE 250 W/ 637 OVERRIDE(OP): Performed by: STUDENT IN AN ORGANIZED HEALTH CARE EDUCATION/TRAINING PROGRAM

## 2024-11-13 PROCEDURE — 80053 COMPREHEN METABOLIC PANEL: CPT

## 2024-11-13 PROCEDURE — 770020 HCHG ROOM/CARE - TELE (206)

## 2024-11-13 PROCEDURE — 85347 COAGULATION TIME ACTIVATED: CPT

## 2024-11-13 PROCEDURE — 700111 HCHG RX REV CODE 636 W/ 250 OVERRIDE (IP): Performed by: GENERAL PRACTICE

## 2024-11-13 RX ORDER — SODIUM CHLORIDE 9 MG/ML
500 INJECTION, SOLUTION INTRAVENOUS ONCE
Status: COMPLETED | OUTPATIENT
Start: 2024-11-13 | End: 2024-11-13

## 2024-11-13 RX ORDER — SODIUM CHLORIDE 1 G/1
1 TABLET ORAL
Status: DISCONTINUED | OUTPATIENT
Start: 2024-11-13 | End: 2024-11-13

## 2024-11-13 RX ORDER — METOPROLOL TARTRATE 25 MG/1
25 TABLET, FILM COATED ORAL ONCE
Status: COMPLETED | OUTPATIENT
Start: 2024-11-13 | End: 2024-11-13

## 2024-11-13 RX ORDER — METOPROLOL TARTRATE 25 MG/1
25 TABLET, FILM COATED ORAL 2 TIMES DAILY
Status: DISCONTINUED | OUTPATIENT
Start: 2024-11-13 | End: 2024-11-13

## 2024-11-13 RX ORDER — METOPROLOL TARTRATE 50 MG
50 TABLET ORAL 2 TIMES DAILY
Status: DISCONTINUED | OUTPATIENT
Start: 2024-11-13 | End: 2024-11-20 | Stop reason: HOSPADM

## 2024-11-13 RX ORDER — METOPROLOL TARTRATE 1 MG/ML
5 INJECTION, SOLUTION INTRAVENOUS
Status: COMPLETED | OUTPATIENT
Start: 2024-11-13 | End: 2024-11-13

## 2024-11-13 RX ORDER — METRONIDAZOLE 500 MG/1
500 TABLET ORAL EVERY 12 HOURS
Status: DISCONTINUED | OUTPATIENT
Start: 2024-11-13 | End: 2024-11-13

## 2024-11-13 RX ORDER — METOPROLOL TARTRATE 1 MG/ML
5 INJECTION, SOLUTION INTRAVENOUS
Status: DISCONTINUED | OUTPATIENT
Start: 2024-11-13 | End: 2024-11-13

## 2024-11-13 RX ADMIN — MICONAZOLE NITRATE: 2 CREAM TOPICAL at 17:16

## 2024-11-13 RX ADMIN — CEFAZOLIN 2 G: 10 INJECTION, POWDER, FOR SOLUTION INTRAVENOUS at 05:03

## 2024-11-13 RX ADMIN — ACETAMINOPHEN 650 MG: 325 TABLET ORAL at 17:15

## 2024-11-13 RX ADMIN — OXYCODONE 5 MG: 5 TABLET ORAL at 04:12

## 2024-11-13 RX ADMIN — SODIUM CHLORIDE 500 ML: 9 INJECTION, SOLUTION INTRAVENOUS at 06:08

## 2024-11-13 RX ADMIN — OXYCODONE 5 MG: 5 TABLET ORAL at 12:56

## 2024-11-13 RX ADMIN — PIPERACILLIN AND TAZOBACTAM 4.5 G: 4; .5 INJECTION, POWDER, FOR SOLUTION INTRAVENOUS at 17:23

## 2024-11-13 RX ADMIN — OXYCODONE 5 MG: 5 TABLET ORAL at 17:15

## 2024-11-13 RX ADMIN — HYDROMORPHONE HYDROCHLORIDE 0.25 MG: 1 INJECTION, SOLUTION INTRAMUSCULAR; INTRAVENOUS; SUBCUTANEOUS at 00:09

## 2024-11-13 RX ADMIN — INSULIN LISPRO 4 UNITS: 100 INJECTION, SOLUTION INTRAVENOUS; SUBCUTANEOUS at 17:16

## 2024-11-13 RX ADMIN — MICONAZOLE NITRATE: 2 CREAM TOPICAL at 04:19

## 2024-11-13 RX ADMIN — METOPROLOL TARTRATE 5 MG: 5 INJECTION INTRAVENOUS at 08:51

## 2024-11-13 RX ADMIN — SENNOSIDES AND DOCUSATE SODIUM 2 TABLET: 50; 8.6 TABLET ORAL at 04:12

## 2024-11-13 RX ADMIN — METRONIDAZOLE 500 MG: 500 TABLET ORAL at 08:54

## 2024-11-13 RX ADMIN — METOPROLOL TARTRATE 5 MG: 5 INJECTION INTRAVENOUS at 06:21

## 2024-11-13 RX ADMIN — METOPROLOL TARTRATE 25 MG: 25 TABLET, FILM COATED ORAL at 13:01

## 2024-11-13 RX ADMIN — LOSARTAN POTASSIUM 100 MG: 50 TABLET, FILM COATED ORAL at 05:02

## 2024-11-13 RX ADMIN — METOPROLOL TARTRATE 50 MG: 50 TABLET, FILM COATED ORAL at 17:16

## 2024-11-13 RX ADMIN — INSULIN LISPRO 4 UNITS: 100 INJECTION, SOLUTION INTRAVENOUS; SUBCUTANEOUS at 08:54

## 2024-11-13 RX ADMIN — INSULIN LISPRO 4 UNITS: 100 INJECTION, SOLUTION INTRAVENOUS; SUBCUTANEOUS at 11:26

## 2024-11-13 RX ADMIN — CEFTRIAXONE SODIUM 1000 MG: 10 INJECTION, POWDER, FOR SOLUTION INTRAVENOUS at 09:55

## 2024-11-13 RX ADMIN — METOPROLOL TARTRATE 25 MG: 25 TABLET, FILM COATED ORAL at 08:54

## 2024-11-13 RX ADMIN — INSULIN LISPRO 3 UNITS: 100 INJECTION, SOLUTION INTRAVENOUS; SUBCUTANEOUS at 20:45

## 2024-11-13 RX ADMIN — DIBASIC SODIUM PHOSPHATE, MONOBASIC POTASSIUM PHOSPHATE AND MONOBASIC SODIUM PHOSPHATE 500 MG: 852; 155; 130 TABLET ORAL at 08:53

## 2024-11-13 RX ADMIN — OXYCODONE 5 MG: 5 TABLET ORAL at 20:32

## 2024-11-13 RX ADMIN — PIPERACILLIN AND TAZOBACTAM 4.5 G: 4; .5 INJECTION, POWDER, FOR SOLUTION INTRAVENOUS at 13:09

## 2024-11-13 RX ADMIN — SODIUM CHLORIDE: 9 INJECTION, SOLUTION INTRAVENOUS at 11:20

## 2024-11-13 RX ADMIN — POLYETHYLENE GLYCOL 3350 1 PACKET: 17 POWDER, FOR SOLUTION ORAL at 04:12

## 2024-11-13 RX ADMIN — AMLODIPINE BESYLATE 10 MG: 10 TABLET ORAL at 17:16

## 2024-11-13 RX ADMIN — SIMVASTATIN 20 MG: 40 TABLET, FILM COATED ORAL at 20:32

## 2024-11-13 RX ADMIN — METOPROLOL TARTRATE 5 MG: 5 INJECTION INTRAVENOUS at 11:19

## 2024-11-13 RX ADMIN — PIPERACILLIN AND TAZOBACTAM 4.5 G: 4; .5 INJECTION, POWDER, FOR SOLUTION INTRAVENOUS at 20:54

## 2024-11-13 RX ADMIN — SENNOSIDES AND DOCUSATE SODIUM 2 TABLET: 50; 8.6 TABLET ORAL at 17:16

## 2024-11-13 RX ADMIN — OXYCODONE 5 MG: 5 TABLET ORAL at 23:37

## 2024-11-13 ASSESSMENT — PAIN DESCRIPTION - PAIN TYPE
TYPE: ACUTE PAIN

## 2024-11-13 ASSESSMENT — SOCIAL DETERMINANTS OF HEALTH (SDOH)

## 2024-11-13 ASSESSMENT — COGNITIVE AND FUNCTIONAL STATUS - GENERAL
MOVING FROM LYING ON BACK TO SITTING ON SIDE OF FLAT BED: A LOT
TURNING FROM BACK TO SIDE WHILE IN FLAT BAD: A LOT
MOVING TO AND FROM BED TO CHAIR: A LOT
MOBILITY SCORE: 12
STANDING UP FROM CHAIR USING ARMS: A LOT
DRESSING REGULAR UPPER BODY CLOTHING: A LITTLE
SUGGESTED CMS G CODE MODIFIER MOBILITY: CL
DRESSING REGULAR LOWER BODY CLOTHING: A LOT
DAILY ACTIVITIY SCORE: 17
HELP NEEDED FOR BATHING: A LOT
SUGGESTED CMS G CODE MODIFIER DAILY ACTIVITY: CK
WALKING IN HOSPITAL ROOM: A LOT
TOILETING: A LOT
CLIMB 3 TO 5 STEPS WITH RAILING: A LOT

## 2024-11-13 ASSESSMENT — LIFESTYLE VARIABLES
TOTAL SCORE: 0
CONSUMPTION TOTAL: NEGATIVE
HAVE PEOPLE ANNOYED YOU BY CRITICIZING YOUR DRINKING: NO
ALCOHOL_USE: YES
EVER FELT BAD OR GUILTY ABOUT YOUR DRINKING: NO
AVERAGE NUMBER OF DAYS PER WEEK YOU HAVE A DRINK CONTAINING ALCOHOL: 1
TOTAL SCORE: 0
TOTAL SCORE: 0
EVER HAD A DRINK FIRST THING IN THE MORNING TO STEADY YOUR NERVES TO GET RID OF A HANGOVER: NO
ON A TYPICAL DAY WHEN YOU DRINK ALCOHOL HOW MANY DRINKS DO YOU HAVE: 1
HAVE YOU EVER FELT YOU SHOULD CUT DOWN ON YOUR DRINKING: NO
HOW MANY TIMES IN THE PAST YEAR HAVE YOU HAD 5 OR MORE DRINKS IN A DAY: 0

## 2024-11-13 ASSESSMENT — FIBROSIS 4 INDEX: FIB4 SCORE: 1.53

## 2024-11-13 NOTE — PROGRESS NOTES
Rapid Response Summary     Rapid response called at 0549 for: New Afib    VS: Tachycardia  (See Vitals Flowsheet)  Additional info: new onset Afib  MD Paged: Reuben HAGEN  Interventions:    Imaging/Tests: EKG    Labs: CBC, CMP, and Magnesium   Medications: Fluid and Metoprolol    Other: N/A  Disposition: Improved with rapid response team interventions. Primary RN updated on plan of care. Patient transferred to telemetry.

## 2024-11-13 NOTE — PROGRESS NOTES
NOC CROSSCOVER          Rapid response called for new onset A-fib with RVR.  Reportedly patient heart rate had been ranging from 110's to 140s, stat EKG revealed A-fib with RVR with rates in the 150s.  On arrival patient relatively asymptomatic complaining of mild pressure around her epigastric area, however, no pain, shortness of breath, or signs of hypoperfusion.  Going to transfer to telemetry for cardiac monitoring, check lytes, and order as needed metoprolol for rates sustaining greater than 140.    Vitals:    11/13/24 0610   BP: (!) 124/92   Pulse: (!) 142   Resp: 16   Temp:    SpO2: 90%         #AFIB W/ RVR    Plan   Transfer to tele  Metoprolol for rates sustaining >140  Replace lytes  Check Echo  Pt reports poor po intake, IVF bolus x1

## 2024-11-13 NOTE — CODE DOCUMENTATION
Called charge RN for T8 bed placement, ACT will come sit on patient until bed is cleaned and ready.

## 2024-11-13 NOTE — PROGRESS NOTES
4 Eyes Skin Assessment Completed by MÓNICA Alvarez and TERESITA Russell.    Head WDL  Ears Redness and Blanching  Nose WDL  Mouth WDL  Neck WDL  Breast/Chest WDL  Shoulder Blades WDL  Spine WDL  (R) Arm/Elbow/Hand WDL  (L) Arm/Elbow/Hand WDL  Abdomen WDL  Groin WDL  Scrotum/Coccyx/Buttocks Redness, Blanching, and Excoriation  (R) Leg WDL  (L) Leg WDL  (R) Heel/Foot/Toe Redness, Blanching, and Boggy  (L) Heel/Foot/Toe Redness, Blanching, and Boggy          Devices In Places Tele Box, Blood Pressure Cuff, and Pulse Ox      Interventions In Place Heel Mepilex, TAP System, and Pillows    Possible Skin Injury No    Pictures Uploaded Into Epic N/A  Wound Consult Placed N/A  RN Wound Prevention Protocol Ordered Yes

## 2024-11-13 NOTE — PROGRESS NOTES
Patient A&OX4. Patient stated pain. Meds administered per MAR as well as pain med.  Navarrete in place.  Water and call light within reach.

## 2024-11-13 NOTE — WOUND TEAM
[facility]  Inpatient Services  Wound and Skin Care Brief Evaluation    Admission Date: 11/12/2024     Last order of IP CONSULT TO WOUND CARE was found on 11/12/2024 from Hospital Encounter on 11/12/2024     HPI, PMH, SH: Reviewed    Chief Complaint   Patient presents with    Back Pain     BIB from [place], reports lower back pain since patient had a fall last 10/24. Patient has L1 compression fracture. Patient also states having abd distention and constipation for 1 week. Patient sodium is 119 and CT result showed cholecystitis, here for further work-up. On 02 at 3 LPM baseline, with garcia cath. Sats: 91%      Diagnosis: Urinary retention [R33.9]    Unit where seen by Wound Team: T314/02     Wound consult placed regarding Sacrum/ buttocks. Chart and images reviewed. This clinician in to assess patient. Patient pleasant and agreeable. Patient had moisture related redness to her sacral/ buttocks. Concerning for yeast. Micotin ordered for antifungal therapy. Non-selectively debrided with Moist warm washcloth.     Heels were assessed in this encounter and were intact.    No pressure injuries or advanced wound care needs identified. Wound consult completed. No further follow up unless indicated and consulted.     Moisture Associated Skin Damage 11/12/24 Sacrum;Buttock (Active)   Wound Image   11/12/24 1600   NEXT Weekly Photo (Inpatient Only) 11/19/24 11/12/24 1600   Drainage Amount None 11/12/24 1600   Periwound Assessment Red;Rash 11/12/24 1600   IAD Cleansing Foam Cleanser/Washcloth 11/12/24 1600   Periwound Protectant Barrier Paste 11/12/24 1600   IAD Containment Device Indwelling Catheter 11/12/24 1600   WOUND NURSE ONLY - Time Spent with Patient (mins) 15 11/12/24 1600           PREVENTATIVE INTERVENTIONS:    Q shift Jose - performed per nursing policy  Q shift pressure point assessments - performed per nursing policy    Surface/Positioning  Standard/trauma mattress - Currently in  Place  Reposition q 2 hours - Currently in Place    Offloading/Redistribution  Heel offloading dressing (Silicone dressing) - Currently in Place      Containment/Moisture Prevention    Navarrete Catheter - Currently in Place  Barrier paste - Applied this Visit  Antifungal treatment - Ordered

## 2024-11-13 NOTE — PROGRESS NOTES
"Hospital Medicine Daily Progress Note    Date of Service  11/13/2024    Chief Complaint  Bobbi Rousseau is a 84 y.o. female admitted 11/12/2024 with abdominal pain    Hospital Course  This is an 84-year-old female with past medical history of hypertension, dyslipidemia, type 2 diabetes, hypothyroidism, chronic hypoxemic respiratory failure on 2 to 3 L nocturnal, constipation and history of urinary retention who was admitted on 11/12/2024 with worsening back pain.    She was recently admitted at [facility] from 10/24 - 10/29 following a ground-level fall.  CT scan at the time showed an L1 burst fracture, she was discharged to rehab.    CT abdomen/pelvis with contrast was done which showed \"L1 burst fracture with 4 mm retropulsion... Diffuse gallbladder wall thickening with a dilated gallbladder measuring 5.4 cm in diameter and 0.1 cm in length.  Prominent adjacent inflammatory changes noted.  Findings are favored to relate to cholecystitis.  No evidence of gallbladder perforation or abscess.... Urinary bladder distention. \"  RUQ US -noted cholelithiasis, gallbladder sludge, thickened gallbladder, sonographic signs compatible with cholecystitis.  Patient started on Rocephin and Flagyl.     Urinalysis significant for UTI, patient with significan urinary retention requiring Navarrete catheter placement. Started on Rocephin.    Lumbar MRI noted moderate canal narrowing at L4-L5 with moderate left lateral recess stenosis.  Superior endplate compression fracture at L1 with extensive bone marrow edema and a fluid filled vertebral body collapse subjacent to the superior endplate.  Approximately about 30% loss of height with minimal posterior superior cortical retropulsion without cauda equina compression.    On 11/13 AM, patient noted to have new onset atrial fibrillation with RVR.  Started on metoprolol, echocardiogram pending.    Interval Problem Update  CT abdomen/pelvis with contrast was done which showed \"L1 burst " "fracture with 4 mm retropulsion... Diffuse gallbladder wall thickening with a dilated gallbladder measuring 5.4 cm in diameter and 0.1 cm in length.  Prominent adjacent inflammatory changes noted.  Findings are favored to relate to cholecystitis.  No evidence of gallbladder perforation or abscess.... Urinary bladder distention. \"  RUQ US -noted cholelithiasis, gallbladder sludge, thickened gallbladder, sonographic signs compatible with cholecystitis.  Patient started on Rocephin and Flagyl.     Surgery was consulted, antibiotics escalated to Zosyn.  Given the patient with increased risk, recommend nonoperative management, continue antibiotic therapy with Zosyn.      Urinalysis significant for UTI, patient with significan urinary retention requiring Navarrete catheter placement. Started on Rocephin.    She was recently admitted at [facility] from 10/24 - 10/29 following a ground-level fall.  CT scan at the time showed an L1 burst fracture, she was discharged to rehab.    Lumbar MRI noted moderate canal narrowing at L4-L5 with moderate left lateral recess stenosis.  Superior endplate compression fracture at L1 with extensive bone marrow edema and a fluid filled vertebral body collapse subjacent to the superior endplate.  Approximately about 30% loss of height with minimal posterior superior cortical retropulsion without cauda equina compression.    On 11/13 AM, patient noted to have new onset atrial fibrillation with RVR. Started on oral metoprolol, echocardiogram pending.  Transfer to telemetry.    I have discussed this patient's plan of care and discharge plan at IDT rounds today with Case Management, Nursing, Nursing leadership, and other members of the IDT team.    Consultants/Specialty  general surgery    Code Status  DNAR/DNI    Disposition  Patient is not medically cleared to return back to SNF.    I have placed the appropriate orders for post-discharge needs.    Review of Systems  Review of Systems   All other systems " reviewed and are negative.       Physical Exam  Temp:  [36.3 °C (97.3 °F)-36.5 °C (97.7 °F)] 36.4 °C (97.5 °F)  Pulse:  [] 120  Resp:  [12-19] 18  BP: (110-148)/(70-98) 134/70  SpO2:  [90 %-95 %] 93 %    Physical Exam  Vitals and nursing note reviewed.   Constitutional:       General: She is not in acute distress.  HENT:      Head: Normocephalic and atraumatic.      Mouth/Throat:      Mouth: Mucous membranes are moist.      Pharynx: No oropharyngeal exudate.   Eyes:      Extraocular Movements: Extraocular movements intact.      Pupils: Pupils are equal, round, and reactive to light.   Cardiovascular:      Rate and Rhythm: Normal rate and regular rhythm.      Pulses: Normal pulses.      Heart sounds: No murmur heard.     No friction rub. No gallop.   Pulmonary:      Effort: Pulmonary effort is normal. No respiratory distress.      Breath sounds: No wheezing, rhonchi or rales.   Abdominal:      General: Bowel sounds are normal. There is no distension.      Palpations: Abdomen is soft. There is no mass.      Tenderness: There is abdominal tenderness (RUQ tenderness).   Musculoskeletal:         General: No swelling or tenderness. Normal range of motion.      Cervical back: Normal range of motion. No rigidity. No muscular tenderness.      Right lower leg: No edema.      Left lower leg: No edema.   Skin:     General: Skin is warm and dry.      Capillary Refill: Capillary refill takes less than 2 seconds.      Findings: No erythema or rash.   Neurological:      General: No focal deficit present.      Mental Status: She is alert and oriented to person, place, and time.      Motor: No weakness.      Gait: Gait normal.         Fluids    Intake/Output Summary (Last 24 hours) at 11/13/2024 1354  Last data filed at 11/13/2024 1304  Gross per 24 hour   Intake 550 ml   Output 1250 ml   Net -700 ml        Laboratory  Recent Labs     11/12/24  0536 11/13/24  0512   WBC 15.0* 12.6*   RBC 4.91 4.69   HEMOGLOBIN 14.4 14.0    HEMATOCRIT 42.7 41.2   MCV 87.0 87.8   MCH 29.3 29.9   MCHC 33.7 34.0   RDW 42.5 43.6   PLATELETCT 400 384   MPV 9.7 9.7     Recent Labs     11/12/24  1303 11/12/24  2108 11/13/24  0512   SODIUM 127* 127* 126*   POTASSIUM 3.5* 3.6 3.6   CHLORIDE 86* 86* 86*   CO2 29 26 26   GLUCOSE 238* 197* 210*   BUN 17 15 13   CREATININE 0.86 0.72 0.76   CALCIUM 9.1 9.3 9.0                   Imaging  MR-LUMBAR SPINE-W/O   Final Result         Moderate canal narrowing at L4-5 with moderate left lateral recess stenosis.      Superior endplate compression fracture at L1 with extensive bone marrow edema and a fluid-filled vertebral body cleft subadjacent to the superior endplate. There is approximately 30% loss of height with minimal posterior superior cortical retropulsion    but without cauda equina compression.      Mild edema along the superior endplate of L4 may represent a noncompressive bone contusion.         US-RUQ   Final Result         1.  Cholelithiasis and gallbladder sludge with thickened gallbladder wall, sonographically compatible with cholecystitis.   2.  Hepatomegaly   3.  Echogenic liver, compatible with fatty change versus fibrosis.   4.  Echogenic right kidney, nonspecific but can be associated with medical renal disease.      CT-OUTSIDE IMAGES-CT ABDOMEN/PELVIS   Final Result      CT-OUTSIDE IMAGES-CT LUMBAR SPINE   Final Result      CT-OUTSIDE IMAGES-CT CHEST/ABDOMEN/PELVIS   Final Result      EC-ECHOCARDIOGRAM COMPLETE W/O CONT    (Results Pending)   DX-CHEST-PORTABLE (1 VIEW)    (Results Pending)        Assessment/Plan  * Closed stable burst fracture of first lumbar vertebra (HCC)- (present on admission)  Assessment & Plan  Patient fell and admitted at [facility] from 10/24 - 10/29 following CT scan showing L1 burst fracture  She was discharged to rehab and has been having persistent constipation and also found to have urinary retention  Lumbar MRI noted moderate canal narrowing at L4-L5 with moderate left  lateral recess stenosis.  Superior endplate compression fracture at L1 with extensive bone marrow edema and a fluid filled vertebral body collapse subjacent to the superior endplate.  Approximately about 30% loss of height with minimal posterior superior cortical retropulsion without cauda equina compression.  Continue narcotic pain management  PT, OT    Generalized abdominal pain  Assessment & Plan  Concern for possible cholecystitis on outside imaging CT and right upper quadrant ultrasound  Covered empirically with antibiotic therapy  General surgery consulted     Hyponatremia- (present on admission)  Assessment & Plan  119 at outside facility  Unclear etiology, she does appear euvolemic.  Slow rate IV fluids  Urine studies ordered  BMP ordered every 8 to continue monitoring and avoid overcorrection    Acute cystitis without hematuria- (present on admission)  Assessment & Plan  UA outside facility showed moderate bacteria started on ceftriaxone and metronidazole prior to transfer  Day team to follow-up with UA cultures from outside facility.  Repeat UA here ordered  We will continue with abx    Constipation- (present on admission)  Assessment & Plan  She has been on narcotics for recent L1 fracture.  Bowel protocol    Urinary retention- (present on admission)  Assessment & Plan  Navarrete catheter placed prior to transfer from [facility] with 2 L of urine immediately drained  She has been on narcotics for recent fracture  Due to urinary retention as well as constipation there is concern for cauda equina syndrome or other neurologic abnormality following recent L1 burst fracture - RULED OUT     Hypophosphatemia  Assessment & Plan  Oral Supplementation  Serial BMP, magnesium, phosphorus levels ordered for tomorrow morning to continue to monitor electrolytes       Atrial fibrillation with RVR ([facility])  Assessment & Plan  New onset 11/13 a.m.  Started on metoprolol  Echocardiogram pending  [id number] 5    Dyslipidemia-  (present on admission)  Assessment & Plan  Continue statin    Primary hypertension- (present on admission)  Assessment & Plan  Continue losartan    Type 2 diabetes mellitus with hyperglycemia, with long-term current use of insulin (HCC)- (present on admission)  Assessment & Plan  Continue glargine, sliding scale added while in the hospital.  Hold home orals    Acquired hypothyroidism- (present on admission)  Assessment & Plan  Continue levothyroxine         VTE prophylaxis: Lovenox    I have performed a physical exam and reviewed and updated ROS and Plan today (11/13/2024). In review of yesterday's note (11/12/2024), there are no changes except as documented above.      Greater than 51 minutes spent prepping to see patient (e.g. reviewing EMR) obtaining and/or reviewing separately obtained history. Performing a medically appropriate examination and evaluation. Independently interpreting results and communicating results to patient/family/caregiver. Counseling and educating the patient/family/caregiver. Ordering medications, tests, and/or procedures. Referring and communicating with other health care professionals. Documenting clinical information in EPIC. Care coordination.

## 2024-11-13 NOTE — CARE PLAN
The patient is Stable - Low risk of patient condition declining or worsening    Shift Goals  Clinical Goals: abx, pain control  Patient Goals: sleep, pain control  Family Goals: NA    Progress made toward(s) clinical / shift goals:  yes    Patient is not progressing towards the following goals:      Problem: Pain - Standard  Goal: Alleviation of pain or a reduction in pain to the patient’s comfort goal  Outcome: Progressing     Problem: Fall Risk  Goal: Patient will remain free from falls  Outcome: Progressing

## 2024-11-13 NOTE — ASSESSMENT & PLAN NOTE
Oral Supplementation  Serial BMP, magnesium, phosphorus levels ordered for tomorrow morning to continue to monitor electrolytes

## 2024-11-13 NOTE — DOCUMENTATION QUERY
[facility]                                                                       Query Response Note      PATIENT:               DEIRDRE BARNETT  ACCT #:                  [id number]  MRN:                     [id number]  :                      1940  ADMIT DATE:       2024 5:12 AM  DISCH DATE:          RESPONDING  PROVIDER #:        [id number]           QUERY TEXT:    Please clarify in documentation the relationship, if any, between acute cystitis and Navarrete catheter.    Please add your response to this query in your progress notes if you agree, thank you.    The patient's Clinical Indicators include:   H&P: Acute cystitis. Urinary retention, Navarrete catheter placed prior to transfer from [place].   ED note: Urinary tract infection.     Risk factor: Urinary catheter    Treatment: Antibiotics    Thank you,  Santiago De Dios NP, CCDS   Clinical   Connect via [employer]  Options provided:   -- Acute cystitis is due to Navarrete catheter   -- Acute cystitis is unrelated to Navarrete catheter   -- Other explanation, (please specify)   -- Unable to determine      Query created by: Santiago De Dios on 2024 8:47 AM    RESPONSE TEXT:    Acute cystitis is unrelated to Navarrete catheter          Electronically signed by:  JENNY CALERO MD 2024 4:05 PM

## 2024-11-13 NOTE — ASSESSMENT & PLAN NOTE
Concern for possible cholecystitis on outside imaging CT and right upper quadrant ultrasound  Covered empirically with antibiotic therapy  General surgery consulted, s/p sheba novak 11/15; uncomplicated post operative course

## 2024-11-13 NOTE — PROGRESS NOTES
Patient's HR between 120s to 140s this morning. Informed charge nurse and Reuben HAGEN about it.   Stat EKG ordered.  Called rapid response.   Reuben HAGEN and rapid nurses at the bedside.  EKG done  0620 called Nita SALES(Room for T801) for report but she said she will call me back in 10 mits.  0629 they switched room for this patient. Called Christine SALES ( for Room T810). She said she will call me back between 5-10 mits  0644 Gave report to Christine SALES   0730 gave report to Nneka SALES

## 2024-11-13 NOTE — CONSULTS
DATE OF CONSULTATION:  11/13/2024     REFERRING PHYSICIAN:   PORTIA Francisco.     CONSULTING PHYSICIAN:  Pippa Quick M.D.     REASON FOR CONSULTATION:  I have been asked by Dr. Francisco to see the patient in surgical consultation for evaluation of cholecystitis.    HISTORY OF PRESENT ILLNESS: 84 y.o. female with a past medical history notable for diabetes (on home insulin and metformin), hypertension, hypothyroid, and chronic hypoxic respiratory failure (uses 3 L of oxygen at baseline at night).  The patient was admitted at [facility] on 10/24 following a ground-level fall at which time CT showed an L1 burst fracture.  On 29 October she was discharged to a skilled nursing facility.  However, she was having difficulty with bowel movements and urination, and after approximately 6 days of not having a bowel movement was noted to have significant abdominal distention.  She was transferred back to [place] where a CT scan confirmed an L1 burst fracture with 4 mm of retropulsion, showed a distended urinary bladder, as well as a distended gallbladder with some pericholecystic stranding.  On arrival to the ED, she was noted to have acute on chronic hyponatremia with her sodium at 119, as well as a white blood cell count of 17.  Navarrete catheter was placed with return of over 2 L of urine with a UA consistent with urinary tract infection.  The patient was started on ceftriaxone and Flagyl.  She was transferred to [facility] for further evaluation including MRI for possibility of cauda equina syndrome    On arrival at [facility], labs were notable for sodium of 125, white blood cell count of 15, and urinalysis consistent with urinary tract infection.  A right upper quadrant ultrasound revealed thickened gallbladder wall at 4.8 mm with no pericholecystic fluid, and a normal common bile duct (3.8 mm), and layering sludge and gallstones.  The patient was admitted to the MedSur floor and maintained on ceftriaxone and Flagyl.  However, overnight  developed atrial fibrillation with rapid ventricular response to the 150s.  She was transferred to the telemetry floor and given a fluid bolus, and beta-blocker with improvement in her ventricular rate.  Echocardiogram is pending this morning.    This morning on exam, the patient complains mostly of back pain, but is tender in the right upper quadrant    PAST MEDICAL HISTORY:  has a past medical history of Arthritis (7-), Breath shortness (7-), CATARACT, Diabetes, Heart burn, High cholesterol, Hypertension, Pain, and Sleep apnea.    PAST SURGICAL HISTORY:  has a past surgical history that includes component removal (5/19/2009); other (3/06); other (9/03); knee revision total (8/14/08); knee revision total (8/10/2009); hip arthroplasty mis total (10/2016); and knee revision total (Left, 7/27/2017).    ALLERGIES: No Known Allergies    CURRENT MEDICATIONS:    Home Medications       Reviewed by Brenda Sparrow (Pharmacy Tech) on 11/12/24 at 1356  Med List Status: Complete     Medication Last Dose Status   acetaminophen (TYLENOL) 325 MG Tab 11/8/2024 Active   amlodipine (NORVASC) 10 MG Tab 11/11/2024 Active   anastrozole (ARIMIDEX) 1 MG Tab 11/11/2024 Active   FUROSEMIDE 40 MG TABS 11/12/2024 Active   gabapentin (NEURONTIN) 100 MG Cap 11/12/2024 Active   guaifenesin LA (MUCINEX) 600 MG TABLET SR 12 HR 11/12/2024 Active   HYDROcodone-acetaminophen (NORCO) 5-325 MG Tab per tablet 11/10/2024 Active   insulin NPH (HUMULIN/NOVOLIN N) 100 UNIT/ML Suspension 11/12/2024 Active   lidocaine (ASPERFLEX) 4 % Patch 11/12/2024 Active   losartan (COZAAR) 100 MG Tab 11/12/2024 Active   meloxicam (MOBIC) 15 MG tablet 11/12/2024 Active   METFORMIN 1000 MG tablet 11/12/2024 Active   omeprazole (PRILOSEC) 20 MG delayed-release capsule 11/12/2024 Active   ondansetron (ZOFRAN ODT) 4 MG TABLET DISPERSIBLE 11/9/2024 Active   simvastatin (ZOCOR) 20 MG Tab 11/11/2024 Active   VESICARE 5 MG TABS 11/12/2024 Active   ZOLPIDEM  12.5 MG CR tablet 11/11/2024 Active                  Audit from Redirected Encounters    **Home medications have not yet been reviewed for this encounter**         FAMILY HISTORY: family history is not on file.    SOCIAL HISTORY:  reports that she has never smoked. She has never used smokeless tobacco. She reports current alcohol use. She reports that she does not use drugs.    REVIEW OF SYSTEMS: Comprehensive review of systems is negative with the exception of the aforementioned HPI, PMH, and PSH bullets in accordance with CMS guidelines.    PHYSICAL EXAMINATION:    General: Frail-appearing elderly woman who appears uncomfortable  Respiratory: Pursed lip breathing on 4 L/min of supplemental oxygen via nasal cannula  Cardiovascular: Irregularly irregular on the monitor, heart rate in the 80s to 90s  Abdomen: No surgical scars notable, tender to palpation in the right upper quadrant, positive Luo sign  Extremities warm well-perfused, no peripheral edema    LABORATORY VALUES:   Recent Labs     11/12/24  0536 11/13/24  0512   WBC 15.0* 12.6*   RBC 4.91 4.69   HEMOGLOBIN 14.4 14.0   HEMATOCRIT 42.7 41.2   MCV 87.0 87.8   MCH 29.3 29.9   MCHC 33.7 34.0   RDW 42.5 43.6   PLATELETCT 400 384   MPV 9.7 9.7     Recent Labs     11/12/24  1303 11/12/24  2108 11/13/24  0512   SODIUM 127* 127* 126*   POTASSIUM 3.5* 3.6 3.6   CHLORIDE 86* 86* 86*   CO2 29 26 26   GLUCOSE 238* 197* 210*   BUN 17 15 13   CREATININE 0.86 0.72 0.76   CALCIUM 9.1 9.3 9.0     Recent Labs     11/12/24  0536 11/13/24  0512   ASTSGOT 17 19   ALTSGPT 12 7   TBILIRUBIN 0.8 0.4   ALKPHOSPHAT 158* 208*   GLOBULIN 3.9* 3.5            IMAGING:   MR-LUMBAR SPINE-W/O   Final Result         Moderate canal narrowing at L4-5 with moderate left lateral recess stenosis.      Superior endplate compression fracture at L1 with extensive bone marrow edema and a fluid-filled vertebral body cleft subadjacent to the superior endplate. There is approximately 30% loss of  height with minimal posterior superior cortical retropulsion    but without cauda equina compression.      Mild edema along the superior endplate of L4 may represent a noncompressive bone contusion.         US-RUQ   Final Result         1.  Cholelithiasis and gallbladder sludge with thickened gallbladder wall, sonographically compatible with cholecystitis.   2.  Hepatomegaly   3.  Echogenic liver, compatible with fatty change versus fibrosis.   4.  Echogenic right kidney, nonspecific but can be associated with medical renal disease.      CT-OUTSIDE IMAGES-CT ABDOMEN/PELVIS   Final Result      CT-OUTSIDE IMAGES-CT LUMBAR SPINE   Final Result      CT-OUTSIDE IMAGES-CT CHEST/ABDOMEN/PELVIS   Final Result      EC-ECHOCARDIOGRAM COMPLETE W/O CONT    (Results Pending)       ASSESSMENT AND PLAN:   84 y.o. female with multiple medical comorbidities.  Suffered a recent fall with an L1 burst fracture readmitted from SNF with urinary retention and constipation.  Imaging at that time was consistent with cholecystitis.  The patient has a leukocytosis, but also has a urinary tract infection for which she is currently being treated.  Clinically she is tender in the right upper quadrant, consistent with cholecystitis as evidenced by CT and ultrasound imaging.     Patient has multiple medical comorbidities which put her at increased risk of perioperative complications.  According to the ACS Visqid calculator (see below), the patient has an approximately 12% risk of serious complication (compared to average risk of 2%).  While this risk does not necessarily preclude surgery, I feel it would be prudent to trial non-operative management (expand antibiotic coverage to Zosyn) while the patient is being optimized (echocardiogram pending today, CXR pending, continues to be hyponatremic).  Additionally, imaging concerning for possible liver fibrosis.  Will send TEG to evaluate coagulation status    Surgery will continue to follow, will  continue to evaluate the patient clinically and review additional data as they become available.  Please do not hesitate to reach out if patient's condition worsens or if there are questions or concerns        No new Assessment & Plan notes have been filed under this hospital service since the last note was generated.  Service: Surgery General      DISPOSITION: Medical evaluation and admission for multiple comorbidities. [facility] Service will follow.     ____________________________________     Pippa Quick M.D.    DD: 11/13/2024  9:53 AM    AAST Grading System for EGS Conditions  ACS NSQIP Surgical Risk Calculator

## 2024-11-13 NOTE — ASSESSMENT & PLAN NOTE
New onset 11/13 a.m.  Started on metoprolol  Echocardiogram pending  [id number]  Eliquis started for a fib stroke risk

## 2024-11-13 NOTE — DISCHARGE PLANNING
1118  Agency/Facility Name: [facility]  Spoke To: Glenna  Outcome: DPA received call from facility stating they have accepted and they are holding a bed.

## 2024-11-14 ENCOUNTER — APPOINTMENT (OUTPATIENT)
Dept: CARDIOLOGY | Facility: MEDICAL CENTER | Age: 84
DRG: 418 | End: 2024-11-14
Payer: MEDICARE

## 2024-11-14 LAB
ALBUMIN SERPL BCP-MCNC: 2.8 G/DL (ref 3.2–4.9)
ALBUMIN/GLOB SERPL: 0.9 G/DL
ALP SERPL-CCNC: 144 U/L (ref 30–99)
ALT SERPL-CCNC: 5 U/L (ref 2–50)
ANION GAP SERPL CALC-SCNC: 11 MMOL/L (ref 7–16)
AST SERPL-CCNC: 15 U/L (ref 12–45)
BILIRUB SERPL-MCNC: 0.5 MG/DL (ref 0.1–1.5)
BUN SERPL-MCNC: 11 MG/DL (ref 8–22)
CALCIUM ALBUM COR SERPL-MCNC: 9.6 MG/DL (ref 8.5–10.5)
CALCIUM SERPL-MCNC: 8.6 MG/DL (ref 8.5–10.5)
CHLORIDE SERPL-SCNC: 93 MMOL/L (ref 96–112)
CO2 SERPL-SCNC: 25 MMOL/L (ref 20–33)
CREAT SERPL-MCNC: 0.59 MG/DL (ref 0.5–1.4)
EKG IMPRESSION: NORMAL
ERYTHROCYTE [DISTWIDTH] IN BLOOD BY AUTOMATED COUNT: 43.9 FL (ref 35.9–50)
EST. AVERAGE GLUCOSE BLD GHB EST-MCNC: 186 MG/DL
GFR SERPLBLD CREATININE-BSD FMLA CKD-EPI: 90 ML/MIN/1.73 M 2
GLOBULIN SER CALC-MCNC: 3.1 G/DL (ref 1.9–3.5)
GLUCOSE BLD STRIP.AUTO-MCNC: 168 MG/DL (ref 65–99)
GLUCOSE BLD STRIP.AUTO-MCNC: 189 MG/DL (ref 65–99)
GLUCOSE BLD STRIP.AUTO-MCNC: 224 MG/DL (ref 65–99)
GLUCOSE BLD STRIP.AUTO-MCNC: 264 MG/DL (ref 65–99)
GLUCOSE SERPL-MCNC: 169 MG/DL (ref 65–99)
HBA1C MFR BLD: 8.1 % (ref 4–5.6)
HCT VFR BLD AUTO: 42.2 % (ref 37–47)
HGB BLD-MCNC: 14.1 G/DL (ref 12–16)
LV EJECT FRACT  99904: 65
LV EJECT FRACT MOD 2C 99903: 56.41
LV EJECT FRACT MOD 4C 99902: 63.89
LV EJECT FRACT MOD BP 99901: 63.23
MAGNESIUM SERPL-MCNC: 2 MG/DL (ref 1.5–2.5)
MCH RBC QN AUTO: 29.5 PG (ref 27–33)
MCHC RBC AUTO-ENTMCNC: 33.4 G/DL (ref 32.2–35.5)
MCV RBC AUTO: 88.3 FL (ref 81.4–97.8)
PHOSPHATE SERPL-MCNC: 2.8 MG/DL (ref 2.5–4.5)
PLATELET # BLD AUTO: 350 K/UL (ref 164–446)
PMV BLD AUTO: 9.5 FL (ref 9–12.9)
POTASSIUM SERPL-SCNC: 3.4 MMOL/L (ref 3.6–5.5)
PROT SERPL-MCNC: 5.9 G/DL (ref 6–8.2)
RBC # BLD AUTO: 4.78 M/UL (ref 4.2–5.4)
SODIUM SERPL-SCNC: 129 MMOL/L (ref 135–145)
WBC # BLD AUTO: 11.5 K/UL (ref 4.8–10.8)

## 2024-11-14 PROCEDURE — 700102 HCHG RX REV CODE 250 W/ 637 OVERRIDE(OP)

## 2024-11-14 PROCEDURE — 700105 HCHG RX REV CODE 258: Performed by: GENERAL PRACTICE

## 2024-11-14 PROCEDURE — 700102 HCHG RX REV CODE 250 W/ 637 OVERRIDE(OP): Performed by: INTERNAL MEDICINE

## 2024-11-14 PROCEDURE — 700111 HCHG RX REV CODE 636 W/ 250 OVERRIDE (IP): Mod: JZ

## 2024-11-14 PROCEDURE — 36415 COLL VENOUS BLD VENIPUNCTURE: CPT

## 2024-11-14 PROCEDURE — 51798 US URINE CAPACITY MEASURE: CPT

## 2024-11-14 PROCEDURE — 80053 COMPREHEN METABOLIC PANEL: CPT

## 2024-11-14 PROCEDURE — 83036 HEMOGLOBIN GLYCOSYLATED A1C: CPT

## 2024-11-14 PROCEDURE — 700102 HCHG RX REV CODE 250 W/ 637 OVERRIDE(OP): Performed by: GENERAL PRACTICE

## 2024-11-14 PROCEDURE — A9270 NON-COVERED ITEM OR SERVICE: HCPCS | Performed by: GENERAL PRACTICE

## 2024-11-14 PROCEDURE — 93306 TTE W/DOPPLER COMPLETE: CPT | Mod: 26 | Performed by: INTERNAL MEDICINE

## 2024-11-14 PROCEDURE — 85027 COMPLETE CBC AUTOMATED: CPT

## 2024-11-14 PROCEDURE — 97535 SELF CARE MNGMENT TRAINING: CPT

## 2024-11-14 PROCEDURE — 97162 PT EVAL MOD COMPLEX 30 MIN: CPT

## 2024-11-14 PROCEDURE — 99232 SBSQ HOSP IP/OBS MODERATE 35: CPT | Performed by: PHYSICIAN ASSISTANT

## 2024-11-14 PROCEDURE — 93005 ELECTROCARDIOGRAM TRACING: CPT | Performed by: INTERNAL MEDICINE

## 2024-11-14 PROCEDURE — 93306 TTE W/DOPPLER COMPLETE: CPT

## 2024-11-14 PROCEDURE — A9270 NON-COVERED ITEM OR SERVICE: HCPCS | Performed by: STUDENT IN AN ORGANIZED HEALTH CARE EDUCATION/TRAINING PROGRAM

## 2024-11-14 PROCEDURE — 700111 HCHG RX REV CODE 636 W/ 250 OVERRIDE (IP): Mod: JZ | Performed by: INTERNAL MEDICINE

## 2024-11-14 PROCEDURE — 700105 HCHG RX REV CODE 258: Performed by: INTERNAL MEDICINE

## 2024-11-14 PROCEDURE — A9270 NON-COVERED ITEM OR SERVICE: HCPCS

## 2024-11-14 PROCEDURE — 770020 HCHG ROOM/CARE - TELE (206)

## 2024-11-14 PROCEDURE — 700111 HCHG RX REV CODE 636 W/ 250 OVERRIDE (IP): Mod: JZ | Performed by: GENERAL PRACTICE

## 2024-11-14 PROCEDURE — 700102 HCHG RX REV CODE 250 W/ 637 OVERRIDE(OP): Performed by: STUDENT IN AN ORGANIZED HEALTH CARE EDUCATION/TRAINING PROGRAM

## 2024-11-14 PROCEDURE — A9270 NON-COVERED ITEM OR SERVICE: HCPCS | Performed by: INTERNAL MEDICINE

## 2024-11-14 PROCEDURE — 84100 ASSAY OF PHOSPHORUS: CPT

## 2024-11-14 PROCEDURE — 93010 ELECTROCARDIOGRAM REPORT: CPT | Performed by: INTERNAL MEDICINE

## 2024-11-14 PROCEDURE — 99233 SBSQ HOSP IP/OBS HIGH 50: CPT | Performed by: INTERNAL MEDICINE

## 2024-11-14 PROCEDURE — 82962 GLUCOSE BLOOD TEST: CPT | Mod: 91

## 2024-11-14 PROCEDURE — 83735 ASSAY OF MAGNESIUM: CPT

## 2024-11-14 RX ORDER — POTASSIUM CHLORIDE 1500 MG/1
40 TABLET, EXTENDED RELEASE ORAL ONCE
Status: COMPLETED | OUTPATIENT
Start: 2024-11-14 | End: 2024-11-14

## 2024-11-14 RX ADMIN — SENNOSIDES AND DOCUSATE SODIUM 2 TABLET: 50; 8.6 TABLET ORAL at 16:15

## 2024-11-14 RX ADMIN — SIMVASTATIN 20 MG: 40 TABLET, FILM COATED ORAL at 20:49

## 2024-11-14 RX ADMIN — SENNOSIDES AND DOCUSATE SODIUM 2 TABLET: 50; 8.6 TABLET ORAL at 05:09

## 2024-11-14 RX ADMIN — PIPERACILLIN AND TAZOBACTAM 4.5 G: 4; .5 INJECTION, POWDER, FOR SOLUTION INTRAVENOUS at 05:06

## 2024-11-14 RX ADMIN — LOSARTAN POTASSIUM 100 MG: 50 TABLET, FILM COATED ORAL at 05:09

## 2024-11-14 RX ADMIN — HYDROMORPHONE HYDROCHLORIDE 0.25 MG: 1 INJECTION, SOLUTION INTRAMUSCULAR; INTRAVENOUS; SUBCUTANEOUS at 22:08

## 2024-11-14 RX ADMIN — INSULIN LISPRO 7 UNITS: 100 INJECTION, SOLUTION INTRAVENOUS; SUBCUTANEOUS at 20:56

## 2024-11-14 RX ADMIN — AMLODIPINE BESYLATE 10 MG: 10 TABLET ORAL at 16:15

## 2024-11-14 RX ADMIN — METOPROLOL TARTRATE 50 MG: 50 TABLET, FILM COATED ORAL at 05:10

## 2024-11-14 RX ADMIN — INSULIN LISPRO 3 UNITS: 100 INJECTION, SOLUTION INTRAVENOUS; SUBCUTANEOUS at 08:07

## 2024-11-14 RX ADMIN — MICONAZOLE NITRATE: 2 CREAM TOPICAL at 05:09

## 2024-11-14 RX ADMIN — PIPERACILLIN AND TAZOBACTAM 3.38 G: 3; .375 INJECTION, POWDER, FOR SOLUTION INTRAVENOUS at 20:48

## 2024-11-14 RX ADMIN — POTASSIUM CHLORIDE 40 MEQ: 1500 TABLET, EXTENDED RELEASE ORAL at 12:06

## 2024-11-14 RX ADMIN — PIPERACILLIN AND TAZOBACTAM 3.38 G: 3; .375 INJECTION, POWDER, FOR SOLUTION INTRAVENOUS at 12:08

## 2024-11-14 RX ADMIN — INSULIN LISPRO 4 UNITS: 100 INJECTION, SOLUTION INTRAVENOUS; SUBCUTANEOUS at 16:20

## 2024-11-14 RX ADMIN — MICONAZOLE NITRATE: 2 CREAM TOPICAL at 16:16

## 2024-11-14 RX ADMIN — METOPROLOL TARTRATE 50 MG: 50 TABLET, FILM COATED ORAL at 18:00

## 2024-11-14 RX ADMIN — OXYCODONE 2.5 MG: 5 TABLET ORAL at 20:48

## 2024-11-14 RX ADMIN — INSULIN LISPRO 3 UNITS: 100 INJECTION, SOLUTION INTRAVENOUS; SUBCUTANEOUS at 12:12

## 2024-11-14 RX ADMIN — POLYETHYLENE GLYCOL 3350 1 PACKET: 17 POWDER, FOR SOLUTION ORAL at 05:09

## 2024-11-14 ASSESSMENT — COGNITIVE AND FUNCTIONAL STATUS - GENERAL
WALKING IN HOSPITAL ROOM: A LOT
TURNING FROM BACK TO SIDE WHILE IN FLAT BAD: A LITTLE
MOVING FROM LYING ON BACK TO SITTING ON SIDE OF FLAT BED: A LITTLE
SUGGESTED CMS G CODE MODIFIER MOBILITY: CK
MOVING TO AND FROM BED TO CHAIR: A LITTLE
MOBILITY SCORE: 16
STANDING UP FROM CHAIR USING ARMS: A LITTLE
CLIMB 3 TO 5 STEPS WITH RAILING: A LOT

## 2024-11-14 ASSESSMENT — PAIN DESCRIPTION - PAIN TYPE
TYPE: ACUTE PAIN
TYPE: CHRONIC PAIN
TYPE: ACUTE PAIN

## 2024-11-14 NOTE — PROGRESS NOTES
0830: Monitors notified of conversion from Afib to SR at 0700 this am. EKG ordered to confirm, MD notified.

## 2024-11-14 NOTE — PROGRESS NOTES
Assumed care of patient and received report from day shift RN. A&O x 4. VSS. Tele monitor in place. Plan of care discussed with patient and verbalized understanding. Bed locked and in lowest position. Pt educated to fall risk and fall precautions in place. Call light within reach. All questions answered and no other needs indicated at this time.

## 2024-11-14 NOTE — PROGRESS NOTES
Bedside report received from off going RN/tech: Soraida, assumed care of patient.     Fall Risk Score: HIGH RISK  Fall risk interventions in place: Place yellow fall risk ID band on patient, Provide patient/family education based on risk assessment, Educate patient/family to call staff for assistance when getting out of bed, Place fall precaution signage outside patient door, Place patient in room close to nursing station, Utilize bed/chair fall alarm, Notify charge of high risk for huddle, and Bed alarm connected correctly  Bed type: Regular (Jose Score less than 17 interventions in place)  Patient on cardiac monitor: Yes  IVF/IV medications: Not Applicable   Oxygen: How many liters 4L and Traced the line to wall oxygen  Bedside sitter: Not Applicable   Isolation: Not applicable

## 2024-11-14 NOTE — ASSESSMENT & PLAN NOTE
Clinically tender in the right upper quadrant, consistent with cholecystitis as evidenced by CT and ultrasound imaging.  Antibiotic therapy, trend labs.  11/15 Lap scarlet - necrotic gallbladder.  Advance diet as tolerated.  ACS Gray service.

## 2024-11-14 NOTE — CARE PLAN
The patient is Stable - Low risk of patient condition declining or worsening    Shift Goals  Clinical Goals: IV ABX, pain control, stable HR/rhythm  Patient Goals: pain control  Family Goals: em    Progress made toward(s) clinical / shift goals:    Problem: Pain - Standard  Goal: Alleviation of pain or a reduction in pain to the patient’s comfort goal  Outcome: Progressing  Note: Pt educated to pain scale and pain assessed. Pt medicated per MAR with PRN medication. Non-pharmacological pain management techniques encouraged.  Turns and mobility encouraged throughout shift to prevent skin breakdown and prevent complications from decreased movement.      Problem: Knowledge Deficit - Standard  Goal: Patient and family/care givers will demonstrate understanding of plan of care, disease process/condition, diagnostic tests and medications  Outcome: Progressing  Note: Discuss and review POC with patient/family. Re-educate as needed.       Patient is not progressing towards the following goals:

## 2024-11-14 NOTE — PROGRESS NOTES
Attempted to change positions with patient and inform of Q2 turns, howover pt wants to turn on her own terms.   Pt feels uncomfortable turning to the right side and prefers to  stay in the supine position and turn left when she wants.

## 2024-11-14 NOTE — PROGRESS NOTES
Monitor Summary  Rhythm: davidfibTaco DAWSON. Flutter 2:1  Rate: 85-150s  Ectopy: PVC coup  .- / .08 / .-

## 2024-11-14 NOTE — CARE PLAN
The patient is Stable - Low risk of patient condition declining or worsening    Shift Goals  Clinical Goals: IV abx, pain control, stable HR/rhythm  Patient Goals: pain control, sleep  Family Goals: N/A    Progress made toward(s) clinical / shift goals:    Problem: Knowledge Deficit - Standard  Goal: Patient and family/care givers will demonstrate understanding of plan of care, disease process/condition, diagnostic tests and medications  Description: Target End Date:  1-3 days or as soon as patient condition allows    Document in Patient Education    1.  Patient and family/caregiver oriented to unit, equipment, visitation policy and means for communicating concern  2.  Complete/review Learning Assessment  3.  Assess knowledge level of disease process/condition, treatment plan, diagnostic tests and medications  4.  Explain disease process/condition, treatment plan, diagnostic tests and medications  Outcome: Progressing  Note: Pt updated on POC, all questions answered at this time.      Problem: Fall Risk  Goal: Patient will remain free from falls  Description: Target End Date:  Prior to discharge or change in level of care    Document interventions on the [employer] Fall Risk Assessment    1.  Assess for fall risk factors  2.  Implement fall precautions  Outcome: Progressing  Note: Pt is a high fall risk. Bed is in lowest, locked position. Devante light and belongings within reach. Bed alarm is on. Pt calls appropriately.

## 2024-11-14 NOTE — PROGRESS NOTES
"  DATE: 11/14/2024    Hospital Day 2  admitted with urinary retention and RUQ tenderness .    INTERVAL EVENTS:  Feels somewhat better, RUQ tenderness continues, (+) flatus, (-) BM, tolerating clears, WBC trending down.    ROS: Comprehensive review of systems is negative with the exception of the aforementioned HPI, PMH, and PSH elements in accordance with CMS guidelines.     PHYSICAL EXAMINATION:  Vital Signs: /73   Pulse 88   Temp 36.3 °C (97.3 °F) (Temporal)   Resp 16   Ht 1.753 m (5' 9.02\")   Wt 86.7 kg (191 lb 2.2 oz)   SpO2 93%     Physical Exam  Vitals and nursing note reviewed.   Constitutional:       General: She is awake. She is not in acute distress.     Appearance: Normal appearance.      Interventions: Nasal cannula in place.   Pulmonary:      Effort: Prolonged expiration present. No tachypnea, accessory muscle usage, respiratory distress or retractions.   Abdominal:      General: There is no distension.      Palpations: Abdomen is soft.      Tenderness: There is abdominal tenderness in the right upper quadrant.   Neurological:      Mental Status: She is alert.   Psychiatric:         Behavior: Behavior is cooperative.         LABORATORY VALUES:  Recent Labs     11/12/24  0536 11/13/24  0512 11/14/24  0137   WBC 15.0* 12.6* 11.5*   RBC 4.91 4.69 4.78   HEMOGLOBIN 14.4 14.0 14.1   HEMATOCRIT 42.7 41.2 42.2   MCV 87.0 87.8 88.3   MCH 29.3 29.9 29.5   MCHC 33.7 34.0 33.4   RDW 42.5 43.6 43.9   PLATELETCT 400 384 350   MPV 9.7 9.7 9.5     Recent Labs     11/13/24  0512 11/13/24  1543 11/14/24  0137   SODIUM 126* 128* 129*   POTASSIUM 3.6 3.6 3.4*   CHLORIDE 86* 91* 93*   CO2 26 25 25   GLUCOSE 210* 208* 169*   BUN 13 12 11   CREATININE 0.76 0.65 0.59   CALCIUM 9.0 8.8 8.6     Recent Labs     11/12/24  0536 11/13/24  0512 11/14/24  0137   ASTSGOT 17 19 15   ALTSGPT 12 7 5   TBILIRUBIN 0.8 0.4 0.5   ALKPHOSPHAT 158* 208* 144*   GLOBULIN 3.9* 3.5 3.1            IMAGING:  DX-CHEST-PORTABLE (1 VIEW) "   Final Result      LEFT basilar opacity suspicious for pneumonia. Pleural effusion could be present.      MR-LUMBAR SPINE-W/O   Final Result         Moderate canal narrowing at L4-5 with moderate left lateral recess stenosis.      Superior endplate compression fracture at L1 with extensive bone marrow edema and a fluid-filled vertebral body cleft subadjacent to the superior endplate. There is approximately 30% loss of height with minimal posterior superior cortical retropulsion    but without cauda equina compression.      Mild edema along the superior endplate of L4 may represent a noncompressive bone contusion.         US-RUQ   Final Result         1.  Cholelithiasis and gallbladder sludge with thickened gallbladder wall, sonographically compatible with cholecystitis.   2.  Hepatomegaly   3.  Echogenic liver, compatible with fatty change versus fibrosis.   4.  Echogenic right kidney, nonspecific but can be associated with medical renal disease.      CT-OUTSIDE IMAGES-CT ABDOMEN/PELVIS   Final Result      CT-OUTSIDE IMAGES-CT LUMBAR SPINE   Final Result      CT-OUTSIDE IMAGES-CT CHEST/ABDOMEN/PELVIS   Final Result      EC-ECHOCARDIOGRAM COMPLETE W/O CONT    (Results Pending)       ASSESSMENT AND PLAN:  Generalized abdominal pain  Assessment & Plan  Clinically tender in the right upper quadrant, consistent with cholecystitis as evidenced by CT and ultrasound imaging.  Antibiotic therapy, trend labs.  ACS Gray service.      - Follow up echocardiogram  - Continue IV abx  - May need removal of GB during this admission, continue medical optimization for potential surgical procedure         ____________________________________     Naty Taylor P.A.-C.    DD: 11/14/2024  10:37 AM

## 2024-11-14 NOTE — PROGRESS NOTES
Monitor Summary     Rhythm: A-fib/A-flutter  Heart Rate:   Ectopy: R-O PVC, R Coup  Measurement: --/.07/--

## 2024-11-14 NOTE — PROGRESS NOTES
Bedside report received from off going RN/tech: Nilson assumed care of patient.     Fall Risk Score: HIGH RISK  Fall risk interventions in place: Provide patient/family education based on risk assessment, Educate patient/family to call staff for assistance when getting out of bed, Place fall precaution signage outside patient door, Place patient in room close to nursing station, Utilize bed/chair fall alarm, and Bed alarm connected correctly  Bed type: Low air loss (Jose Score less than 17 interventions in place)  Patient on cardiac monitor: Yes  IVF/IV medications: Not Applicable   Oxygen: How many liters 4L  Bedside sitter: Not Applicable   Isolation: Not applicable

## 2024-11-15 ENCOUNTER — ANESTHESIA EVENT (OUTPATIENT)
Dept: SURGERY | Facility: MEDICAL CENTER | Age: 84
DRG: 418 | End: 2024-11-15
Payer: MEDICARE

## 2024-11-15 ENCOUNTER — ANESTHESIA (OUTPATIENT)
Dept: SURGERY | Facility: MEDICAL CENTER | Age: 84
DRG: 418 | End: 2024-11-15
Payer: MEDICARE

## 2024-11-15 LAB
ALBUMIN SERPL BCP-MCNC: 2.7 G/DL (ref 3.2–4.9)
ALBUMIN/GLOB SERPL: 0.8 G/DL
ALP SERPL-CCNC: 138 U/L (ref 30–99)
ALT SERPL-CCNC: 7 U/L (ref 2–50)
ANION GAP SERPL CALC-SCNC: 11 MMOL/L (ref 7–16)
AST SERPL-CCNC: 16 U/L (ref 12–45)
BASOPHILS # BLD AUTO: 0.3 % (ref 0–1.8)
BASOPHILS # BLD: 0.03 K/UL (ref 0–0.12)
BILIRUB SERPL-MCNC: 0.3 MG/DL (ref 0.1–1.5)
BUN SERPL-MCNC: 9 MG/DL (ref 8–22)
CALCIUM ALBUM COR SERPL-MCNC: 9.3 MG/DL (ref 8.5–10.5)
CALCIUM SERPL-MCNC: 8.3 MG/DL (ref 8.5–10.5)
CHLORIDE SERPL-SCNC: 93 MMOL/L (ref 96–112)
CO2 SERPL-SCNC: 26 MMOL/L (ref 20–33)
CREAT SERPL-MCNC: 0.66 MG/DL (ref 0.5–1.4)
EOSINOPHIL # BLD AUTO: 0.34 K/UL (ref 0–0.51)
EOSINOPHIL NFR BLD: 3.9 % (ref 0–6.9)
ERYTHROCYTE [DISTWIDTH] IN BLOOD BY AUTOMATED COUNT: 44.3 FL (ref 35.9–50)
GFR SERPLBLD CREATININE-BSD FMLA CKD-EPI: 87 ML/MIN/1.73 M 2
GLOBULIN SER CALC-MCNC: 3.4 G/DL (ref 1.9–3.5)
GLUCOSE BLD STRIP.AUTO-MCNC: 173 MG/DL (ref 65–99)
GLUCOSE BLD STRIP.AUTO-MCNC: 182 MG/DL (ref 65–99)
GLUCOSE BLD STRIP.AUTO-MCNC: 202 MG/DL (ref 65–99)
GLUCOSE BLD STRIP.AUTO-MCNC: 229 MG/DL (ref 65–99)
GLUCOSE SERPL-MCNC: 183 MG/DL (ref 65–99)
HCT VFR BLD AUTO: 41.1 % (ref 37–47)
HGB BLD-MCNC: 13.4 G/DL (ref 12–16)
IMM GRANULOCYTES # BLD AUTO: 0.1 K/UL (ref 0–0.11)
IMM GRANULOCYTES NFR BLD AUTO: 1.1 % (ref 0–0.9)
LYMPHOCYTES # BLD AUTO: 0.88 K/UL (ref 1–4.8)
LYMPHOCYTES NFR BLD: 10 % (ref 22–41)
MCH RBC QN AUTO: 29.1 PG (ref 27–33)
MCHC RBC AUTO-ENTMCNC: 32.6 G/DL (ref 32.2–35.5)
MCV RBC AUTO: 89.2 FL (ref 81.4–97.8)
MONOCYTES # BLD AUTO: 0.5 K/UL (ref 0–0.85)
MONOCYTES NFR BLD AUTO: 5.7 % (ref 0–13.4)
NEUTROPHILS # BLD AUTO: 6.96 K/UL (ref 1.82–7.42)
NEUTROPHILS NFR BLD: 79 % (ref 44–72)
NRBC # BLD AUTO: 0 K/UL
NRBC BLD-RTO: 0 /100 WBC (ref 0–0.2)
PLATELET # BLD AUTO: 398 K/UL (ref 164–446)
PMV BLD AUTO: 9.5 FL (ref 9–12.9)
POTASSIUM SERPL-SCNC: 3.5 MMOL/L (ref 3.6–5.5)
PROT SERPL-MCNC: 6.1 G/DL (ref 6–8.2)
RBC # BLD AUTO: 4.61 M/UL (ref 4.2–5.4)
SODIUM SERPL-SCNC: 130 MMOL/L (ref 135–145)
WBC # BLD AUTO: 8.8 K/UL (ref 4.8–10.8)

## 2024-11-15 PROCEDURE — 160009 HCHG ANES TIME/MIN: Performed by: SURGERY

## 2024-11-15 PROCEDURE — 700101 HCHG RX REV CODE 250: Performed by: ANESTHESIOLOGY

## 2024-11-15 PROCEDURE — 51798 US URINE CAPACITY MEASURE: CPT

## 2024-11-15 PROCEDURE — 700102 HCHG RX REV CODE 250 W/ 637 OVERRIDE(OP)

## 2024-11-15 PROCEDURE — 160002 HCHG RECOVERY MINUTES (STAT): Performed by: SURGERY

## 2024-11-15 PROCEDURE — A9270 NON-COVERED ITEM OR SERVICE: HCPCS

## 2024-11-15 PROCEDURE — 700105 HCHG RX REV CODE 258: Performed by: INTERNAL MEDICINE

## 2024-11-15 PROCEDURE — 85025 COMPLETE CBC W/AUTO DIFF WBC: CPT

## 2024-11-15 PROCEDURE — 160029 HCHG SURGERY MINUTES - 1ST 30 MINS LEVEL 4: Performed by: SURGERY

## 2024-11-15 PROCEDURE — 0FT44ZZ RESECTION OF GALLBLADDER, PERCUTANEOUS ENDOSCOPIC APPROACH: ICD-10-PCS | Performed by: SURGERY

## 2024-11-15 PROCEDURE — 700102 HCHG RX REV CODE 250 W/ 637 OVERRIDE(OP): Performed by: STUDENT IN AN ORGANIZED HEALTH CARE EDUCATION/TRAINING PROGRAM

## 2024-11-15 PROCEDURE — 700111 HCHG RX REV CODE 636 W/ 250 OVERRIDE (IP): Mod: JZ | Performed by: ANESTHESIOLOGY

## 2024-11-15 PROCEDURE — 700102 HCHG RX REV CODE 250 W/ 637 OVERRIDE(OP): Performed by: GENERAL PRACTICE

## 2024-11-15 PROCEDURE — 700111 HCHG RX REV CODE 636 W/ 250 OVERRIDE (IP): Mod: JZ | Performed by: INTERNAL MEDICINE

## 2024-11-15 PROCEDURE — 700105 HCHG RX REV CODE 258: Performed by: ANESTHESIOLOGY

## 2024-11-15 PROCEDURE — A9270 NON-COVERED ITEM OR SERVICE: HCPCS | Performed by: GENERAL PRACTICE

## 2024-11-15 PROCEDURE — A9270 NON-COVERED ITEM OR SERVICE: HCPCS | Performed by: STUDENT IN AN ORGANIZED HEALTH CARE EDUCATION/TRAINING PROGRAM

## 2024-11-15 PROCEDURE — 160036 HCHG PACU - EA ADDL 30 MINS PHASE I: Performed by: SURGERY

## 2024-11-15 PROCEDURE — 160041 HCHG SURGERY MINUTES - EA ADDL 1 MIN LEVEL 4: Performed by: SURGERY

## 2024-11-15 PROCEDURE — 700111 HCHG RX REV CODE 636 W/ 250 OVERRIDE (IP): Mod: JZ

## 2024-11-15 PROCEDURE — 160048 HCHG OR STATISTICAL LEVEL 1-5: Performed by: SURGERY

## 2024-11-15 PROCEDURE — 80053 COMPREHEN METABOLIC PANEL: CPT

## 2024-11-15 PROCEDURE — 36415 COLL VENOUS BLD VENIPUNCTURE: CPT

## 2024-11-15 PROCEDURE — 700102 HCHG RX REV CODE 250 W/ 637 OVERRIDE(OP): Performed by: ANESTHESIOLOGY

## 2024-11-15 PROCEDURE — 160035 HCHG PACU - 1ST 60 MINS PHASE I: Performed by: SURGERY

## 2024-11-15 PROCEDURE — 97535 SELF CARE MNGMENT TRAINING: CPT

## 2024-11-15 PROCEDURE — A9270 NON-COVERED ITEM OR SERVICE: HCPCS | Performed by: ANESTHESIOLOGY

## 2024-11-15 PROCEDURE — 97166 OT EVAL MOD COMPLEX 45 MIN: CPT

## 2024-11-15 PROCEDURE — 82962 GLUCOSE BLOOD TEST: CPT | Mod: 91

## 2024-11-15 PROCEDURE — 700101 HCHG RX REV CODE 250: Performed by: SURGERY

## 2024-11-15 PROCEDURE — 770001 HCHG ROOM/CARE - MED/SURG/GYN PRIV*

## 2024-11-15 PROCEDURE — 700111 HCHG RX REV CODE 636 W/ 250 OVERRIDE (IP): Performed by: ANESTHESIOLOGY

## 2024-11-15 PROCEDURE — 88304 TISSUE EXAM BY PATHOLOGIST: CPT

## 2024-11-15 RX ORDER — LIDOCAINE HYDROCHLORIDE 20 MG/ML
INJECTION, SOLUTION EPIDURAL; INFILTRATION; INTRACAUDAL; PERINEURAL PRN
Status: DISCONTINUED | OUTPATIENT
Start: 2024-11-15 | End: 2024-11-15 | Stop reason: SURG

## 2024-11-15 RX ORDER — DEXAMETHASONE SODIUM PHOSPHATE 4 MG/ML
INJECTION, SOLUTION INTRA-ARTICULAR; INTRALESIONAL; INTRAMUSCULAR; INTRAVENOUS; SOFT TISSUE PRN
Status: DISCONTINUED | OUTPATIENT
Start: 2024-11-15 | End: 2024-11-15 | Stop reason: SURG

## 2024-11-15 RX ORDER — SODIUM CHLORIDE 9 MG/ML
INJECTION, SOLUTION INTRAVENOUS
Status: DISCONTINUED | OUTPATIENT
Start: 2024-11-15 | End: 2024-11-15 | Stop reason: SURG

## 2024-11-15 RX ORDER — HYDROMORPHONE HYDROCHLORIDE 1 MG/ML
0.4 INJECTION, SOLUTION INTRAMUSCULAR; INTRAVENOUS; SUBCUTANEOUS
Status: DISCONTINUED | OUTPATIENT
Start: 2024-11-15 | End: 2024-11-15 | Stop reason: HOSPADM

## 2024-11-15 RX ORDER — HALOPERIDOL 5 MG/ML
1 INJECTION INTRAMUSCULAR
Status: DISCONTINUED | OUTPATIENT
Start: 2024-11-15 | End: 2024-11-15 | Stop reason: HOSPADM

## 2024-11-15 RX ORDER — PHENYLEPHRINE HCL IN 0.9% NACL 1 MG/10 ML
SYRINGE (ML) INTRAVENOUS PRN
Status: DISCONTINUED | OUTPATIENT
Start: 2024-11-15 | End: 2024-11-15 | Stop reason: SURG

## 2024-11-15 RX ORDER — BUPIVACAINE HYDROCHLORIDE AND EPINEPHRINE 5; 5 MG/ML; UG/ML
INJECTION, SOLUTION EPIDURAL; INTRACAUDAL; PERINEURAL
Status: DISCONTINUED | OUTPATIENT
Start: 2024-11-15 | End: 2024-11-15 | Stop reason: HOSPADM

## 2024-11-15 RX ORDER — HYDROMORPHONE HYDROCHLORIDE 1 MG/ML
0.2 INJECTION, SOLUTION INTRAMUSCULAR; INTRAVENOUS; SUBCUTANEOUS
Status: DISCONTINUED | OUTPATIENT
Start: 2024-11-15 | End: 2024-11-15 | Stop reason: HOSPADM

## 2024-11-15 RX ORDER — ONDANSETRON 2 MG/ML
4 INJECTION INTRAMUSCULAR; INTRAVENOUS
Status: COMPLETED | OUTPATIENT
Start: 2024-11-15 | End: 2024-11-15

## 2024-11-15 RX ORDER — CEFAZOLIN SODIUM 1 G/3ML
INJECTION, POWDER, FOR SOLUTION INTRAMUSCULAR; INTRAVENOUS PRN
Status: DISCONTINUED | OUTPATIENT
Start: 2024-11-15 | End: 2024-11-15 | Stop reason: SURG

## 2024-11-15 RX ORDER — HYDROMORPHONE HYDROCHLORIDE 2 MG/ML
INJECTION, SOLUTION INTRAMUSCULAR; INTRAVENOUS; SUBCUTANEOUS PRN
Status: DISCONTINUED | OUTPATIENT
Start: 2024-11-15 | End: 2024-11-15 | Stop reason: SURG

## 2024-11-15 RX ORDER — ONDANSETRON 2 MG/ML
INJECTION INTRAMUSCULAR; INTRAVENOUS PRN
Status: DISCONTINUED | OUTPATIENT
Start: 2024-11-15 | End: 2024-11-15 | Stop reason: SURG

## 2024-11-15 RX ORDER — ROCURONIUM BROMIDE 10 MG/ML
INJECTION, SOLUTION INTRAVENOUS PRN
Status: DISCONTINUED | OUTPATIENT
Start: 2024-11-15 | End: 2024-11-15 | Stop reason: SURG

## 2024-11-15 RX ORDER — OXYCODONE HCL 5 MG/5 ML
5 SOLUTION, ORAL ORAL
Status: COMPLETED | OUTPATIENT
Start: 2024-11-15 | End: 2024-11-15

## 2024-11-15 RX ORDER — OXYCODONE HCL 5 MG/5 ML
10 SOLUTION, ORAL ORAL
Status: COMPLETED | OUTPATIENT
Start: 2024-11-15 | End: 2024-11-15

## 2024-11-15 RX ORDER — HYDRALAZINE HYDROCHLORIDE 20 MG/ML
5 INJECTION INTRAMUSCULAR; INTRAVENOUS
Status: DISCONTINUED | OUTPATIENT
Start: 2024-11-15 | End: 2024-11-15 | Stop reason: HOSPADM

## 2024-11-15 RX ORDER — HYDROMORPHONE HYDROCHLORIDE 1 MG/ML
0.5 INJECTION, SOLUTION INTRAMUSCULAR; INTRAVENOUS; SUBCUTANEOUS
Status: DISCONTINUED | OUTPATIENT
Start: 2024-11-15 | End: 2024-11-15 | Stop reason: HOSPADM

## 2024-11-15 RX ORDER — ALBUTEROL SULFATE 5 MG/ML
2.5 SOLUTION RESPIRATORY (INHALATION)
Status: DISCONTINUED | OUTPATIENT
Start: 2024-11-15 | End: 2024-11-15 | Stop reason: HOSPADM

## 2024-11-15 RX ADMIN — ROCURONIUM BROMIDE 70 MG: 50 INJECTION, SOLUTION INTRAVENOUS at 14:30

## 2024-11-15 RX ADMIN — HYDROMORPHONE HYDROCHLORIDE 0.2 MG: 1 INJECTION, SOLUTION INTRAMUSCULAR; INTRAVENOUS; SUBCUTANEOUS at 16:53

## 2024-11-15 RX ADMIN — LIDOCAINE HYDROCHLORIDE 100 MG: 20 INJECTION, SOLUTION EPIDURAL; INFILTRATION; INTRACAUDAL; PERINEURAL at 14:30

## 2024-11-15 RX ADMIN — INSULIN LISPRO 4 UNITS: 100 INJECTION, SOLUTION INTRAVENOUS; SUBCUTANEOUS at 20:14

## 2024-11-15 RX ADMIN — INSULIN LISPRO 3 UNITS: 100 INJECTION, SOLUTION INTRAVENOUS; SUBCUTANEOUS at 12:30

## 2024-11-15 RX ADMIN — SODIUM CHLORIDE: 9 INJECTION, SOLUTION INTRAVENOUS at 14:24

## 2024-11-15 RX ADMIN — Medication 100 MCG: at 14:36

## 2024-11-15 RX ADMIN — Medication 200 MCG: at 14:33

## 2024-11-15 RX ADMIN — Medication 100 MCG: at 15:00

## 2024-11-15 RX ADMIN — AMLODIPINE BESYLATE 10 MG: 10 TABLET ORAL at 18:45

## 2024-11-15 RX ADMIN — FENTANYL CITRATE 50 MCG: 50 INJECTION, SOLUTION INTRAMUSCULAR; INTRAVENOUS at 16:35

## 2024-11-15 RX ADMIN — CEFAZOLIN 2 G: 1 INJECTION, POWDER, FOR SOLUTION INTRAMUSCULAR; INTRAVENOUS at 14:24

## 2024-11-15 RX ADMIN — SIMVASTATIN 20 MG: 40 TABLET, FILM COATED ORAL at 20:11

## 2024-11-15 RX ADMIN — HYDROMORPHONE HYDROCHLORIDE 0.25 MG: 1 INJECTION, SOLUTION INTRAMUSCULAR; INTRAVENOUS; SUBCUTANEOUS at 09:58

## 2024-11-15 RX ADMIN — HYDROMORPHONE HYDROCHLORIDE 0.5 MG: 2 INJECTION INTRAMUSCULAR; INTRAVENOUS; SUBCUTANEOUS at 14:27

## 2024-11-15 RX ADMIN — OXYCODONE 5 MG: 5 TABLET ORAL at 04:30

## 2024-11-15 RX ADMIN — PIPERACILLIN AND TAZOBACTAM 3.38 G: 3; .375 INJECTION, POWDER, FOR SOLUTION INTRAVENOUS at 12:38

## 2024-11-15 RX ADMIN — HYDROMORPHONE HYDROCHLORIDE 0.25 MG: 1 INJECTION, SOLUTION INTRAMUSCULAR; INTRAVENOUS; SUBCUTANEOUS at 12:34

## 2024-11-15 RX ADMIN — METHOCARBAMOL 500 MG: 100 INJECTION, SOLUTION INTRAMUSCULAR; INTRAVENOUS at 17:16

## 2024-11-15 RX ADMIN — HYDROMORPHONE HYDROCHLORIDE 0.2 MG: 1 INJECTION, SOLUTION INTRAMUSCULAR; INTRAVENOUS; SUBCUTANEOUS at 17:05

## 2024-11-15 RX ADMIN — OXYCODONE HYDROCHLORIDE 10 MG: 5 SOLUTION ORAL at 16:30

## 2024-11-15 RX ADMIN — FENTANYL CITRATE 50 MCG: 50 INJECTION, SOLUTION INTRAMUSCULAR; INTRAVENOUS at 16:30

## 2024-11-15 RX ADMIN — HYDROMORPHONE HYDROCHLORIDE 0.2 MG: 1 INJECTION, SOLUTION INTRAMUSCULAR; INTRAVENOUS; SUBCUTANEOUS at 17:00

## 2024-11-15 RX ADMIN — SUGAMMADEX 200 MG: 100 INJECTION, SOLUTION INTRAVENOUS at 16:08

## 2024-11-15 RX ADMIN — Medication 200 MCG: at 15:14

## 2024-11-15 RX ADMIN — PROPOFOL 150 MG: 10 INJECTION, EMULSION INTRAVENOUS at 14:30

## 2024-11-15 RX ADMIN — ROCURONIUM BROMIDE 20 MG: 50 INJECTION, SOLUTION INTRAVENOUS at 15:35

## 2024-11-15 RX ADMIN — PIPERACILLIN AND TAZOBACTAM 3.38 G: 3; .375 INJECTION, POWDER, FOR SOLUTION INTRAVENOUS at 20:22

## 2024-11-15 RX ADMIN — Medication 100 MCG: at 14:58

## 2024-11-15 RX ADMIN — Medication 200 MCG: at 14:41

## 2024-11-15 RX ADMIN — PIPERACILLIN AND TAZOBACTAM 3.38 G: 3; .375 INJECTION, POWDER, FOR SOLUTION INTRAVENOUS at 04:30

## 2024-11-15 RX ADMIN — HYDROMORPHONE HYDROCHLORIDE 0.5 MG: 2 INJECTION INTRAMUSCULAR; INTRAVENOUS; SUBCUTANEOUS at 16:10

## 2024-11-15 RX ADMIN — DEXAMETHASONE SODIUM PHOSPHATE 8 MG: 4 INJECTION INTRA-ARTICULAR; INTRALESIONAL; INTRAMUSCULAR; INTRAVENOUS; SOFT TISSUE at 14:33

## 2024-11-15 RX ADMIN — MICONAZOLE NITRATE: 2 CREAM TOPICAL at 04:32

## 2024-11-15 RX ADMIN — ONDANSETRON 4 MG: 2 INJECTION INTRAMUSCULAR; INTRAVENOUS at 16:08

## 2024-11-15 RX ADMIN — OXYCODONE 5 MG: 5 TABLET ORAL at 20:17

## 2024-11-15 RX ADMIN — Medication 100 MCG: at 15:37

## 2024-11-15 RX ADMIN — OXYCODONE 5 MG: 5 TABLET ORAL at 09:12

## 2024-11-15 RX ADMIN — Medication 100 MCG: at 14:55

## 2024-11-15 RX ADMIN — SENNOSIDES AND DOCUSATE SODIUM 2 TABLET: 50; 8.6 TABLET ORAL at 18:45

## 2024-11-15 RX ADMIN — METOPROLOL TARTRATE 50 MG: 50 TABLET, FILM COATED ORAL at 04:30

## 2024-11-15 RX ADMIN — MICONAZOLE NITRATE: 2 CREAM TOPICAL at 18:49

## 2024-11-15 RX ADMIN — ONDANSETRON 4 MG: 2 INJECTION INTRAMUSCULAR; INTRAVENOUS at 16:30

## 2024-11-15 RX ADMIN — INSULIN LISPRO 4 UNITS: 100 INJECTION, SOLUTION INTRAVENOUS; SUBCUTANEOUS at 08:15

## 2024-11-15 RX ADMIN — METOPROLOL TARTRATE 50 MG: 50 TABLET, FILM COATED ORAL at 18:45

## 2024-11-15 ASSESSMENT — COGNITIVE AND FUNCTIONAL STATUS - GENERAL
TURNING FROM BACK TO SIDE WHILE IN FLAT BAD: A LOT
PERSONAL GROOMING: A LOT
HELP NEEDED FOR BATHING: A LOT
DRESSING REGULAR UPPER BODY CLOTHING: A LOT
DRESSING REGULAR LOWER BODY CLOTHING: A LOT
WALKING IN HOSPITAL ROOM: A LOT
PERSONAL GROOMING: A LITTLE
EATING MEALS: A LOT
SUGGESTED CMS G CODE MODIFIER DAILY ACTIVITY: CK
DAILY ACTIVITIY SCORE: 10
DAILY ACTIVITIY SCORE: 16
CLIMB 3 TO 5 STEPS WITH RAILING: TOTAL
MOVING FROM LYING ON BACK TO SITTING ON SIDE OF FLAT BED: A LOT
DRESSING REGULAR UPPER BODY CLOTHING: A LITTLE
SUGGESTED CMS G CODE MODIFIER DAILY ACTIVITY: CL
MOBILITY SCORE: 11
STANDING UP FROM CHAIR USING ARMS: A LOT
TOILETING: TOTAL
SUGGESTED CMS G CODE MODIFIER MOBILITY: CL
MOVING TO AND FROM BED TO CHAIR: A LOT
TOILETING: A LOT
HELP NEEDED FOR BATHING: TOTAL
DRESSING REGULAR LOWER BODY CLOTHING: A LOT

## 2024-11-15 ASSESSMENT — PAIN DESCRIPTION - PAIN TYPE
TYPE: ACUTE PAIN

## 2024-11-15 ASSESSMENT — ACTIVITIES OF DAILY LIVING (ADL): TOILETING: INDEPENDENT

## 2024-11-15 ASSESSMENT — FIBROSIS 4 INDEX: FIB4 SCORE: 1.57

## 2024-11-15 ASSESSMENT — PAIN SCALES - GENERAL: PAIN_LEVEL: 6

## 2024-11-15 NOTE — ANESTHESIA PROCEDURE NOTES
Airway    Date/Time: 11/15/2024 2:31 PM    Performed by: Manoj Fay M.D.  Authorized by: Manoj Fay M.D.    Location:  OR  Urgency:  Elective  Indications for Airway Management:  Anesthesia      Spontaneous Ventilation: absent    Sedation Level:  Deep  Preoxygenated: Yes    Patient Position:  Sniffing  Mask Difficulty Assessment:  1 - vent by mask  Final Airway Type:  Endotracheal airway  Final Endotracheal Airway:  ETT  Cuffed: Yes    Technique Used for Successful ETT Placement:  Direct laryngoscopy    Insertion Site:  Oral  Blade Type:  Ernst  Laryngoscope Blade/Videolaryngoscope Blade Size:  2  ETT Size (mm):  7.0  Measured from:  Lips  ETT to Lips (cm):  22  Placement Verified by: auscultation and capnometry    Cormack-Lehane Classification:  Grade I - full view of glottis  Number of Attempts at Approach:  1

## 2024-11-15 NOTE — THERAPY
Physical Therapy   Initial Evaluation     Patient Name: Bobbi Rousseau  Age:  84 y.o., Sex:  female  Medical Record #: [id number]  Today's Date: 11/14/2024     Precautions  Precautions: Fall Risk    Assessment  Patient is an 84 y.o. female with hx of hypertension, dyslipidemia, type 2 diabetes, hypothyroidism, chronic hypoxemic respiratory failure on 2 to 3 L nocturnal, recent admit to [facility] 10/24 for GLF and L1 burst fx. Pt now admitted from rehab with constipation, urinary retention, and cholecystitis. Developed new onset A fib RVR while admitted. PT eval complete, pt currently presents below her functional baseline due to pain and impaired strength, balance, activity tolerance, gait, and overall mobility. Pt is generally at Min A for bed mobility and STS with FWW, further activity deferred due to pt reporting increased back pain. Recommend post acute placement at this time. Will follow while admitted.     Plan    Physical Therapy Initial Treatment Plan   Treatment Plan : Bed Mobility, Gait Training, Neuro Re-Education / Balance, Self Care / Home Evaluation, Stair Training, Therapeutic Activities, Therapeutic Exercise  Treatment Frequency: 4 Times per Week  Duration: Until Therapy Goals Met    DC Equipment Recommendations: Unable to determine at this time  Discharge Recommendations: Recommend post-acute placement for additional physical therapy services prior to discharge home         Vitals   O2 (LPM) 2   O2 Delivery Device Silicone Nasal Cannula   Vitals Comments VSS   Pain 0 - 10 Group   Location Back   Location Orientation Lower   Pain Rating Scale (NPRS)   (not quantified)   Therapist Pain Assessment   (increased pain wtih standing)   Non Verbal Descriptors   Non Verbal Scale  Calm   Prior Living Situation   Prior Services None;Home-Independent   Housing / Facility 1 Story House   Steps Into Home 3   Steps In Home 0   Equipment Owned   (upright walker)   Lives with - Patient's Self Care  Capacity Spouse   Comments reports her  is home and able to assist with household tasks. cannot help physically   Prior Level of Functional Mobility   Bed Mobility Independent   Transfer Status Independent   Ambulation Independent   Ambulation Distance limited community distances   Assistive Devices Used   (upright walker)   Stairs Independent   History of Falls   History of Falls Yes   Date of Last Fall   (reason for recent admit last month)   Cognition    Cognition / Consciousness WDL   Level of Consciousness Alert   Comments pleasant and participatory, Jackson. somewhat flat affect   Active ROM Lower Body    Active ROM Lower Body  WDL   Strength Lower Body   Lower Body Strength  X   Comments 4/5 left knee extension, pt reports weakness due to prior surgery   Sensation Lower Body   Lower Extremity Sensation   WDL   Balance Assessment   Sitting Balance (Static) Fair   Sitting Balance (Dynamic) Fair -   Standing Balance (Static) Fair -   Standing Balance (Dynamic) Poor +   Weight Shift Sitting Fair   Weight Shift Standing Poor   Comments FWW in standing   Bed Mobility    Supine to Sit Minimal Assist   Sit to Supine Minimal Assist   Scooting Minimal Assist   Rolling Minimum Assist to Lt.   Comments HOB slightly elevated, used rail   Gait Analysis   Comments NT per pt request due to back pain   Functional Mobility   Sit to Stand Minimal Assist   Mobility STS x1 with FWW   6 Clicks Assessment - How much HELP from from another person do you currently need... (If the patient hasn't done an activity recently, how much help from another person do you think he/she would need if he/she tried?)   Turning from your back to your side while in a flat bed without using bedrails? 3   Moving from lying on your back to sitting on the side of a flat bed without using bedrails? 3   Moving to and from a bed to a chair (including a wheelchair)? 3   Standing up from a chair using your arms (e.g., wheelchair, or bedside chair)? 3    Walking in hospital room? 2   Climbing 3-5 steps with a railing? 2   6 clicks Mobility Score 16   Short Term Goals    Short Term Goal # 1 pt will move supine<>eob with spv in 6 tx for bed mobility.   Short Term Goal # 2 pt will complete spt with fww and spv in 6 tx for functional mobility.   Short Term Goal # 3 pt will ambulate 150 ft with fww and spv in 6 tx for household distances.   Short Term Goal # 4 pt will negotiate 3 stairs with sba in 6 tx for home access.   Education Group   Education Provided Role of Physical Therapist   Role of Physical Therapist Patient Response Patient;Acceptance;Explanation;Verbal Demonstration   Additional Comments education with pt on her CLOF, DC planning, appropriate AD use (FWW vs upright walker), activity progression while admitted   Physical Therapy Initial Treatment Plan    Treatment Plan  Bed Mobility;Gait Training;Neuro Re-Education / Balance;Self Care / Home Evaluation;Stair Training;Therapeutic Activities;Therapeutic Exercise   Treatment Frequency 4 Times per Week   Duration Until Therapy Goals Met   Problem List    Problems Pain;Impaired Bed Mobility;Impaired Transfers;Impaired Ambulation;Functional Strength Deficit;Impaired Balance;Decreased Activity Tolerance   Anticipated Discharge Equipment and Recommendations   DC Equipment Recommendations Unable to determine at this time   Discharge Recommendations Recommend post-acute placement for additional physical therapy services prior to discharge home   Interdisciplinary Plan of Care Collaboration   IDT Collaboration with  Nursing   Patient Position at End of Therapy In Bed;Bed Alarm On;Call Light within Reach;Tray Table within Reach;Phone within Reach   Collaboration Comments RN updated   Session Information   Date / Session Number  11/14- 1 (1/4, 11/20)

## 2024-11-15 NOTE — PROGRESS NOTES
Transport here to take pt via bed to pre op for surgery. Pt on full tank at 2L NC. TTR complete. CHG complete and PIV flushes.

## 2024-11-15 NOTE — PROGRESS NOTES
"  DATE: 11/15/2024    Hospital Day 3  admitted with urinary retention and RUQ tenderness .    INTERVAL EVENTS:  Echocardiogram complete, EF 65%.  No change in physical exam, RUQ tenderness continues, (+) flatus, (-) BM, tolerating clears, WBC pending today.    ROS: Comprehensive review of systems is negative with the exception of the aforementioned HPI, PMH, and PSH elements in accordance with CMS guidelines.     PHYSICAL EXAMINATION:  Vital Signs: /67   Pulse 89   Temp 36.7 °C (98 °F) (Temporal)   Resp 15   Ht 1.753 m (5' 9.02\")   Wt 92.5 kg (203 lb 14.8 oz)   SpO2 92%     Physical Exam  Vitals and nursing note reviewed.   Constitutional:       General: She is awake. She is not in acute distress.     Appearance: Normal appearance.      Interventions: Nasal cannula in place.   Pulmonary:      Effort: Prolonged expiration present. No tachypnea, accessory muscle usage, respiratory distress or retractions.   Abdominal:      General: There is no distension.      Palpations: Abdomen is soft.      Tenderness: There is abdominal tenderness in the right upper quadrant.   Neurological:      Mental Status: She is alert.   Psychiatric:         Behavior: Behavior is cooperative.         LABORATORY VALUES:  Recent Labs     11/13/24  0512 11/14/24  0137   WBC 12.6* 11.5*   RBC 4.69 4.78   HEMOGLOBIN 14.0 14.1   HEMATOCRIT 41.2 42.2   MCV 87.8 88.3   MCH 29.9 29.5   MCHC 34.0 33.4   RDW 43.6 43.9   PLATELETCT 384 350   MPV 9.7 9.5     Recent Labs     11/13/24  0512 11/13/24  1543 11/14/24  0137   SODIUM 126* 128* 129*   POTASSIUM 3.6 3.6 3.4*   CHLORIDE 86* 91* 93*   CO2 26 25 25   GLUCOSE 210* 208* 169*   BUN 13 12 11   CREATININE 0.76 0.65 0.59   CALCIUM 9.0 8.8 8.6     Recent Labs     11/13/24  0512 11/14/24  0137   ASTSGOT 19 15   ALTSGPT 7 5   TBILIRUBIN 0.4 0.5   ALKPHOSPHAT 208* 144*   GLOBULIN 3.5 3.1            IMAGING:      ASSESSMENT AND PLAN:  Generalized abdominal pain  Assessment & Plan  Clinically tender " in the right upper quadrant, consistent with cholecystitis as evidenced by CT and ultrasound imaging.  Antibiotic therapy, trend labs.  ACS Gray service.      - Follow up labs  - Will schedule for laparoscopic cholecystectomy today pending results  - Dr. COLTON Tena discussed risks with patient         ____________________________________     Naty Taylor P.A.-C.

## 2024-11-15 NOTE — DISCHARGE PLANNING
-1214  DPA left message for Glenna at [facility], requesting callback regarding question on transport over the weekend.     -1220  DPA received call from Glenna with [facility]. Facility can only accept pt over the weekend if med list is received in the next few hours. Facility uses pharmacy in [place] so they cannot accommodate meds if they aren't ordered by today. Facility can take pt on Monday or Tuesday of next week if this weekend is not possible.

## 2024-11-15 NOTE — PROGRESS NOTES
Monitor Summary  Rhythm: SR converted from Afib @ 0700  Rate: 83-92  Ectopy: None  .17 / .06 / .40

## 2024-11-15 NOTE — THERAPY
Occupational Therapy   Initial Evaluation     Patient Name: Bobbi Rousseau  Age:  84 y.o., Sex:  female  Medical Record #: [id number]  Today's Date: 11/15/2024     Precautions  Precautions: Fall Risk, Spinal / Back Precautions   Comments: L1 burst fracture    Assessment    Patient is 84 y.o. female admitted after a GLF, found to have an L1 burst fracture at OSH 10/24. Pt was admitted to [facility] 11/12 from rehab with constipation, urinary retention, and cholecystis (pending gallbladder removal). During this admission, pt developed new onset a-fib with RVR. Other pertinent medical history includes HTN, DLD, DM, hypothyroidism, and respiratory failure. Pt seen for OT evaluation and treatment. Pt required min A for bed mobility, min A for ADL transfer, and max A to don/doff socks. Pt reported that she lives with her spouse at baseline who can assist with light tasks. Pt educated regarding spinal precautions in relation to ADLs, the role of OT, and the pathology of bedrest. Pt current functional performance limited by impaired balance, generalized weakness, and pain. Pt will benefit from skilled OT while admitted to acute care.     Plan    Occupational Therapy Initial Treatment Plan   Treatment Interventions: Self Care / Activities of Daily Living, Adaptive Equipment, Therapeutic Exercises, Neuro Re-Education / Balance, Therapeutic Activity  Treatment Frequency: 4 Times per Week  Duration: Until Therapy Goals Met    DC Equipment Recommendations: Unable to determine at this time  Discharge Recommendations: Recommend post-acute placement for additional occupational therapy services prior to discharge home      Objective     11/15/24 0938   Prior Living Situation   Prior Services None;Home-Independent   Housing / Facility 1 Story House   Steps Into Home 3   Steps In Home 0   Bathroom Set up Walk In Shower;Bathtub / Shower Combination;Shower Chair   Equipment Owned 4-Wheel Walker;Tub / Shower Seat;Raised Toilet  Seat With Arms   Lives with - Patient's Self Care Capacity Spouse   Comments Pt reported her spouse able to provide limited support   Prior Level of ADL Function   Self Feeding Independent   Grooming / Hygiene Independent   Bathing Independent   Dressing Independent   Toileting Independent   Comments prior to injury   Prior Level of IADL Function   Medication Management Independent   Laundry Requires Assist   Kitchen Mobility Requires Assist   Finances Independent   Home Management Requires Assist   Shopping Dependent   Prior Level Of Mobility Independent With Device in Community;Independent With Device in Home   Driving / Transportation Relatives / Others Provide Transportation   Occupation (Pre-Hospital Vocational) Retired Due To Age   History of Falls   History of Falls Yes   Date of Last Fall   (reason for admission)   Precautions   Precautions Fall Risk;Spinal / Back Precautions    Comments L1 burst fracture   Vitals   Pulse 83   Blood Pressure  109/66   Pulse Oximetry 94 %   O2 (LPM) 2   O2 Delivery Device Silicone Nasal Cannula   Pain   Pain Scales 0 to 10 Scale    Pain 0 - 10 Group   Therapist Pain Assessment During Activity;Nurse Notified  (Not rated, agreeable to session)   Non Verbal Descriptors   Non Verbal Scale  Calm   Cognition    Cognition / Consciousness WDL   Level of Consciousness Alert   Comments Cooperative, hard of hearing   Passive ROM Upper Body   Passive ROM Upper Body WDL   Active ROM Upper Body   Active ROM Upper Body  WDL   Strength Upper Body   Upper Body Strength  X   Gross Strength Generalized Weakness, Equal Bilaterally.    Sensation Upper Body   Upper Extremity Sensation  WDL   Upper Body Muscle Tone   Upper Body Muscle Tone  WDL   Coordination Upper Body   Comments WFL from observation   Balance Assessment   Sitting Balance (Static) Fair   Sitting Balance (Dynamic) Fair -   Standing Balance (Static) Fair -   Standing Balance (Dynamic) Poor +   Weight Shift Sitting Fair   Weight  Shift Standing Fair   Comments w/ FWW   Bed Mobility    Supine to Sit Minimal Assist   Sit to Supine   (up to chair post)   Scooting Minimal Assist   Rolling Minimum Assist to Lt.   Comments Cues for log roll, HOB flat, use of rails   ADL Assessment   Grooming   (demonstrated ability to reach face with B hands)   Upper Body Dressing Minimal Assist  (don/doff gown)   Lower Body Dressing Maximal Assist  (don/doff socks)   Functional Mobility   Sit to Stand Minimal Assist   Bed, Chair, Wheelchair Transfer Minimal Assist   Transfer Method Stand Step   Mobility EOB>stand>chair   Comments w/ FWW   Visual Perception   Visual Perception  Not Tested   Activity Tolerance   Sitting in Chair up to chair post, encouraged to stay up for at least 30 minutes   Sitting Edge of Bed >5 min   Standing <5 min   Comments limited by weakness, pain   Patient / Family Goals   Patient / Family Goal #1 to go home   Short Term Goals   Short Term Goal # 1 Pt will perform LB dressing w/ supv and AE PRN   Short Term Goal # 2 Pt will perform ADL transfer w/ supv   Short Term Goal # 3 Pt will perform toilet hygiene w/ supv   Short Term Goal # 4 Pt pravin perform standing g/h w/ supv   Education Group   Education Provided Role of Occupational Therapist;Activities of Daily Living;Spinal Precautions;Transfers;Adaptive Equipment   Role of Occupational Therapist Patient Response Patient;Acceptance;Explanation;Verbal Demonstration   Spinal Precautions Patient Response Patient;Acceptance;Explanation;Demonstration;Handout;Verbal Demonstration;Action Demonstration   Transfers Patient Response Patient;Acceptance;Explanation;Demonstration;Verbal Demonstration;Action Demonstration;Reinforcement Needed   ADL Patient Response Patient;Acceptance;Explanation;Demonstration;Verbal Demonstration;Action Demonstration   Adaptive Equipment Patient Response Patient;Acceptance;Explanation;Demonstration;Verbal Demonstration;Action Demonstration   Occupational Therapy Initial  Treatment Plan    Treatment Interventions Self Care / Activities of Daily Living;Adaptive Equipment;Therapeutic Exercises;Neuro Re-Education / Balance;Therapeutic Activity   Treatment Frequency 4 Times per Week   Duration Until Therapy Goals Met

## 2024-11-15 NOTE — PROGRESS NOTES
Bedside report received from off going RN: Nimco, assumed care of patient.      Fall Risk Score: HIGH RISK  Fall risk interventions in place: Place yellow fall risk ID band on patient, Provide patient/family education based on risk assessment, Educate patient/family to call staff for assistance when getting out of bed, Place fall precaution signage outside patient door, Utilize bed/chair fall alarm, Notify charge of high risk for huddle, and Bed alarm connected correctly  Bed type: low airloss (Jose Score less than 17 interventions in place)  Patient on cardiac monitor: Yes  IVF/IV medications: not applicable   Oxygen: 3L NC  Bedside sitter: Not Applicable   Isolation: Not applicable

## 2024-11-15 NOTE — CARE PLAN
The patient is Stable - Low risk of patient condition declining or worsening    Shift Goals  Clinical Goals: ABX, pain control, mobility, surgery  Patient Goals: pain control and sleep  Family Goals: em    Progress made toward(s) clinical / shift goals:        Problem: Pain - Standard  Goal: Alleviation of pain or a reduction in pain to the patient’s comfort goal  Description: Target End Date:  Prior to discharge or change in level of care    Document on Vitals flowsheet    1.  Document pain using the appropriate pain scale per order or unit policy  2.  Educate and implement non-pharmacologic comfort measures (i.e. relaxation, distraction, massage, cold/heat therapy, etc.)  3.  Pain management medications as ordered  4.  Reassess pain after pain med administration per policy  5.  If opiods administered assess patient's response to pain medication is appropriate per POSS sedation scale  6.  Follow pain management plan developed in collaboration with patient and interdisciplinary team (including palliative care or pain specialists if applicable)  Outcome: Progressing     Problem: Knowledge Deficit - Standard  Goal: Patient and family/care givers will demonstrate understanding of plan of care, disease process/condition, diagnostic tests and medications  Description: Target End Date:  1-3 days or as soon as patient condition allows    Document in Patient Education    1.  Patient and family/caregiver oriented to unit, equipment, visitation policy and means for communicating concern  2.  Complete/review Learning Assessment  3.  Assess knowledge level of disease process/condition, treatment plan, diagnostic tests and medications  4.  Explain disease process/condition, treatment plan, diagnostic tests and medications  Outcome: Progressing     Problem: Fall Risk  Goal: Patient will remain free from falls  Description: Target End Date:  Prior to discharge or change in level of care    Document interventions on the Bautista  Puneet Fall Risk Assessment    1.  Assess for fall risk factors  2.  Implement fall precautions  Outcome: Progressing     Problem: Skin Integrity  Goal: Skin integrity is maintained or improved  Description: Target End Date:  Prior to discharge or change in level of care    Document interventions on Skin Risk/Jose flowsheet groups and corresponding LDA    1.  Assess and monitor skin integrity, appearance and/or temperature  2.  Assess risk factors for impaired skin integrity and/or pressures ulcers  3.  Implement precautions to protect skin integrity in collaboration with interdisciplinary team  4.  Implement pressure ulcer prevention protocol if at risk for skin breakdown  5.  Confirm wound care consult if at risk for skin breakdown  6.  Ensure patient use of pressure relieving devices  (Low air loss bed, waffle overlay, heel protectors, ROHO cushion, etc)  Outcome: Progressing     Problem: Infection  Goal: Will remain free from infection  Outcome: Progressing     Problem: Fluid Volume:  Goal: Will maintain balanced intake and output  Outcome: Progressing     Problem: Respiratory:  Goal: Respiratory status will improve  Outcome: Progressing     Problem: Urinary Elimination:  Goal: Ability to reestablish a normal urinary elimination pattern will improve  Outcome: Progressing

## 2024-11-15 NOTE — PROGRESS NOTES
Straight cath x1 overnight. Pt retaining 391mL on bladder scan this morning. APRN notified. Order received for garcia catheter. Placed 16fr garcia catheter @0770

## 2024-11-15 NOTE — ANESTHESIA PREPROCEDURE EVALUATION
Case: [id number] Date/Time: 11/15/24 1415    Procedure: CHOLECYSTECTOMY, LAPAROSCOPIC    Location: [facility] OR 14 / SURGERY [facility]    Surgeons: Wes Tena M.D.          Currently in sinus rhythm, echo report from this admission reviewed (no significant abnormalities). Oxygen at night for ONUR.    Relevant Problems   CARDIAC   (positive) Atrial fibrillation with RVR (HCC)   (positive) Primary hypertension      ENDO   (positive) Acquired hypothyroidism   (positive) Type 2 diabetes mellitus with hyperglycemia, with long-term current use of insulin (HCC)       Physical Exam    Airway   Mallampati: II  TM distance: >3 FB  Neck ROM: full       Cardiovascular - normal exam  Rhythm: regular  Rate: normal  (-) murmur     Dental - normal exam      Very poor dentition     Pulmonary - normal exam  Breath sounds clear to auscultation     Abdominal    Neurological - normal exam                   Anesthesia Plan    ASA 3   ASA physical status 3 criteria: diabetes - poorly controlled    Plan - general       Airway plan will be ETT          Induction: intravenous    Postoperative Plan: Postoperative administration of opioids is intended.    Pertinent diagnostic labs and testing reviewed    Informed Consent:    Anesthetic plan and risks discussed with patient.    Use of blood products discussed with: patient whom consented to blood products.

## 2024-11-15 NOTE — PROGRESS NOTES
Pt requests not to go back to [facility] in [place] d/t feeling neglected.     Pts  is unable to care for pt and unable to care for self. Sister in law and brother of pt are very involved in pts life and health. Please notify sister in law for updates.

## 2024-11-15 NOTE — PROGRESS NOTES
Met with Bobbi Rousseau before surgery   Discussed findings  IMPRESSION:        1.  Cholelithiasis and gallbladder sludge with thickened gallbladder wall, sonographically compatible with cholecystitis.  2.  Hepatomegaly  3.  Echogenic liver, compatible with fatty change versus fibrosis.  4.  Echogenic right kidney, nonspecific but can be associated with medical renal disease.  Discussed options including ongoing nonoperative therapy  Discussed cholecystectomy  Discussed increased risk due to age comorbidities  Discussed surgical option laparoscopic possible open cholecystectomy    Bobbi Rousseau demonstrated understanding would like to proceed with surgery     The surgical conduct was discussed in detail. Potential complications including, but not limited to, infection, bleeding requiring transfusion, damage to adjacent structures, bile duct injury, bile leak, retained stone, choledocholithiasis, pancreatitis need to convert to an open procedure, and anesthetic complications were discussed.     All questions were answered and are discussed the patient satisfaction  Unsuccessful attempt was made to contact family to update on plan with telephone number patient provided    Plan laparoscopic possible open cholecystectomy

## 2024-11-15 NOTE — PROGRESS NOTES
Pts sister in law updated on POC and surgery today scheduled for 1430. All questions and concerns answered.

## 2024-11-15 NOTE — PROGRESS NOTES
"Hospital Medicine Daily Progress Note    Date of Service  11/14/2024    Chief Complaint  Bobbi Rousseau is a 84 y.o. female admitted 11/12/2024 with abdominal pain    Hospital Course  This is an 84-year-old female with past medical history of hypertension, dyslipidemia, type 2 diabetes, hypothyroidism, chronic hypoxemic respiratory failure on 2 to 3 L nocturnal, constipation and history of urinary retention who was admitted on 11/12/2024 with worsening back pain.    She was recently admitted at [facility] from 10/24 - 10/29 following a ground-level fall.  CT scan at the time showed an L1 burst fracture, she was discharged to rehab.    CT abdomen/pelvis with contrast was done which showed \"L1 burst fracture with 4 mm retropulsion... Diffuse gallbladder wall thickening with a dilated gallbladder measuring 5.4 cm in diameter and 0.1 cm in length.  Prominent adjacent inflammatory changes noted.  Findings are favored to relate to cholecystitis.  No evidence of gallbladder perforation or abscess.... Urinary bladder distention. \"  RUQ US -noted cholelithiasis, gallbladder sludge, thickened gallbladder, sonographic signs compatible with cholecystitis.  Patient started on Rocephin and Flagyl.     Urinalysis significant for UTI, patient with significan urinary retention requiring Navarrete catheter placement. Started on Rocephin.    Lumbar MRI noted moderate canal narrowing at L4-L5 with moderate left lateral recess stenosis.  Superior endplate compression fracture at L1 with extensive bone marrow edema and a fluid filled vertebral body collapse subjacent to the superior endplate.  Approximately about 30% loss of height with minimal posterior superior cortical retropulsion without cauda equina compression.    On 11/13 AM, patient noted to have new onset atrial fibrillation with RVR.  Started on metoprolol, echocardiogram pending.    Interval Problem Update  Patient seen and examine at bedside  Managing chronic L1 burst " "fracture which imaging described no cauda equina and as stable (recent October 20204 [place] hospitalization for this and was discharged to rehab), par Afib w/ RVR NOT on anticoagulation, cholecystitis, UTI, in the setting of insulin dependent diabetes, and is also on chronic O2 has ONUR on CPAP. Had severe hyponatremia at OSH (Na 119).. CT abdomen/pelvis showed \"L1 burst fracture with 4 mm retropulsion, diffuse gallbladder wall thickening with a dilated gallbladder measuring 5.4 cm in diameter and 0.1 cm in length. Prominent adjacent inflammatory changes noted. Findings are favored to relate to cholecystitis. No evidence of gallbladder perforation or abscess; urinary bladder distention. RUQ US also confirmed cholecystitis. Dr. Wall, Surgery consulted. Anticipates cholecystectomy prior to discharge, they recommend medical optimization first for his multiple comorbidities. Afib appears to be new onset hence not on anticoagulation; Lovenox ppx held currently sinus    Nonoperative management for L1 burst fracture as before, \"cauda equina ruled out\". On Rocephin and Flagyl empirically. Surgery ASSESSING for CHOLECYSTECTOMY soon. HR stable at 88, Echo PENDING.;On previous discussions with Cardiology, new onset par Afib will still require anticoagulation. CHADSVASc is 5. QUERY surgeon, when to restart full anticoagulation. BG 200s, ordered A1c.   Stable no belly pain  PT/OT and return to rehab  I reviewed the chart along with vitals, labs, imaging, test (both pending and resulted) and recommendations from specialists and interdisciplinary team.      I have discussed this patient's plan of care and discharge plan at IDT rounds today with Case Management, Nursing, Nursing leadership, and other members of the IDT team.    Consultants/Specialty  general surgery    Code Status  DNAR/DNI    Disposition  Patient is not medically cleared to return back to SNF.    I have placed the appropriate orders for post-discharge " needs.    Review of Systems  Review of Systems   All other systems reviewed and are negative.       Physical Exam  Temp:  [36.4 °C (97.5 °F)-36.7 °C (98 °F)] 36.6 °C (97.9 °F)  Pulse:  [76-89] 76  Resp:  [15-18] 15  BP: (102-128)/(67-79) 123/75  SpO2:  [90 %-96 %] 94 %    Physical Exam  Vitals and nursing note reviewed.   Constitutional:       General: She is not in acute distress.  HENT:      Head: Normocephalic and atraumatic.      Mouth/Throat:      Mouth: Mucous membranes are moist.      Pharynx: No oropharyngeal exudate.   Eyes:      Extraocular Movements: Extraocular movements intact.      Pupils: Pupils are equal, round, and reactive to light.   Cardiovascular:      Rate and Rhythm: Normal rate and regular rhythm.      Pulses: Normal pulses.      Heart sounds: No murmur heard.     No friction rub. No gallop.   Pulmonary:      Effort: Pulmonary effort is normal. No respiratory distress.      Breath sounds: No wheezing, rhonchi or rales.   Abdominal:      General: Bowel sounds are normal. There is no distension.      Palpations: Abdomen is soft. There is no mass.      Tenderness: There is abdominal tenderness (RUQ tenderness).   Musculoskeletal:         General: No swelling or tenderness. Normal range of motion.      Cervical back: Normal range of motion. No rigidity. No muscular tenderness.      Right lower leg: No edema.      Left lower leg: No edema.   Skin:     General: Skin is warm and dry.      Capillary Refill: Capillary refill takes less than 2 seconds.      Findings: No erythema or rash.   Neurological:      General: No focal deficit present.      Mental Status: She is alert and oriented to person, place, and time.      Motor: No weakness.      Gait: Gait normal.         Fluids    Intake/Output Summary (Last 24 hours) at 11/15/2024 0817  Last data filed at 11/15/2024 0700  Gross per 24 hour   Intake 630 ml   Output 700 ml   Net -70 ml        Laboratory  Recent Labs     11/13/24  0512 11/14/24  0137    WBC 12.6* 11.5*   RBC 4.69 4.78   HEMOGLOBIN 14.0 14.1   HEMATOCRIT 41.2 42.2   MCV 87.8 88.3   MCH 29.9 29.5   MCHC 34.0 33.4   RDW 43.6 43.9   PLATELETCT 384 350   MPV 9.7 9.5     Recent Labs     11/13/24  0512 11/13/24  1543 11/14/24  0137   SODIUM 126* 128* 129*   POTASSIUM 3.6 3.6 3.4*   CHLORIDE 86* 91* 93*   CO2 26 25 25   GLUCOSE 210* 208* 169*   BUN 13 12 11   CREATININE 0.76 0.65 0.59   CALCIUM 9.0 8.8 8.6                   Imaging  EC-ECHOCARDIOGRAM COMPLETE W/O CONT   Final Result      DX-CHEST-PORTABLE (1 VIEW)   Final Result      LEFT basilar opacity suspicious for pneumonia. Pleural effusion could be present.      MR-LUMBAR SPINE-W/O   Final Result         Moderate canal narrowing at L4-5 with moderate left lateral recess stenosis.      Superior endplate compression fracture at L1 with extensive bone marrow edema and a fluid-filled vertebral body cleft subadjacent to the superior endplate. There is approximately 30% loss of height with minimal posterior superior cortical retropulsion    but without cauda equina compression.      Mild edema along the superior endplate of L4 may represent a noncompressive bone contusion.         US-RUQ   Final Result         1.  Cholelithiasis and gallbladder sludge with thickened gallbladder wall, sonographically compatible with cholecystitis.   2.  Hepatomegaly   3.  Echogenic liver, compatible with fatty change versus fibrosis.   4.  Echogenic right kidney, nonspecific but can be associated with medical renal disease.      CT-OUTSIDE IMAGES-CT ABDOMEN/PELVIS   Final Result      CT-OUTSIDE IMAGES-CT LUMBAR SPINE   Final Result      CT-OUTSIDE IMAGES-CT CHEST/ABDOMEN/PELVIS   Final Result           Assessment/Plan  * Closed stable burst fracture of first lumbar vertebra (HCC)- (present on admission)  Assessment & Plan  Patient fell and admitted at [facility] from 10/24 - 10/29 following CT scan showing L1 burst fracture  She was discharged to rehab and has been having  persistent constipation and also found to have urinary retention  Lumbar MRI noted moderate canal narrowing at L4-L5 with moderate left lateral recess stenosis.  Superior endplate compression fracture at L1 with extensive bone marrow edema and a fluid filled vertebral body collapse subjacent to the superior endplate.  Approximately about 30% loss of height with minimal posterior superior cortical retropulsion without cauda equina compression.  Continue narcotic pain management  PT, OT    Cauda equina ruled out outpt follow up to NSG, PT and OT    Hypophosphatemia  Assessment & Plan  Oral Supplementation  Serial BMP, magnesium, phosphorus levels ordered for tomorrow morning to continue to monitor electrolytes       Atrial fibrillation with RVR ([facility])  Assessment & Plan  New onset 11/13 a.m.  Started on metoprolol  Echocardiogram pending  [id number]    Discuss starting anticoag after procedure    Generalized abdominal pain  Assessment & Plan  Concern for possible cholecystitis on outside imaging CT and right upper quadrant ultrasound  Covered empirically with antibiotic therapy  General surgery consulted     Hyponatremia- (present on admission)  Assessment & Plan  119 at outside facility  Unclear etiology, she does appear euvolemic.  Slow rate IV fluids  Urine studies ordered  BMP ordered every 8 to continue monitoring and avoid overcorrection    Constipation- (present on admission)  Assessment & Plan  She has been on narcotics for recent L1 fracture.  Bowel protocol    Dyslipidemia- (present on admission)  Assessment & Plan  Continue statin    Primary hypertension- (present on admission)  Assessment & Plan  Continue losartan    Type 2 diabetes mellitus with hyperglycemia, with long-term current use of insulin ([facility])- (present on admission)  Assessment & Plan  Continue glargine, sliding scale added while in the hospital.  Hold home orals    Acquired hypothyroidism- (present on admission)  Assessment & Plan  Continue  levothyroxine    Acute cystitis without hematuria- (present on admission)  Assessment & Plan  UA outside facility showed moderate bacteria started on ceftriaxone and metronidazole prior to transfer  Day team to follow-up with UA cultures from outside facility.  Repeat UA here ordered  We will continue with abx    Urinary retention- (present on admission)  Assessment & Plan  Navarrete catheter placed prior to transfer from [facility] with 2 L of urine immediately drained  She has been on narcotics for recent fracture  Due to urinary retention as well as constipation there is concern for cauda equina syndrome or other neurologic abnormality following recent L1 burst fracture - RULED OUT     CHolecystitis? Surgery plans to remove gallbladder if echo normal/stable.     VTE prophylaxis: Lovenox    I have performed a physical exam and reviewed and updated ROS and Plan today (11/15/2024). In review of yesterday's note (11/14/2024), there are no changes except as documented above.    Patient is has a high medical complexity, complex decision making and is at high risk for complication, morbidity, and mortality, thus requiring the highest level of my preparedness for sudden, emergent intervention. Medical decision making is therefore complex. I provided  services, which included ordering labs and/or imaging, and discussing the case with various consultants.medication orders, frequent reevaluations of the patient's condition and response to treatment. Time was also devoted to counseling and coordinating care including review of records, pertinent lab data and studies, as well as discussing diagnostic evaluation and work up, planned therapeutic interventions and future disposition of care. Where indicated, the assessment and plan reflect discussion of patient with consultants, other healthcare providers, family members, and additional research needed to obtain further information in formulating the plan of care for Bobbi Barksdale  Clarita Rousseau. Total time spent was 51 minutes.   I

## 2024-11-15 NOTE — PROGRESS NOTES
Bedside report received from off going RN/tech: Nevaeh, assumed care of patient.     Fall Risk Score: HIGH RISK  Fall risk interventions in place: Place yellow fall risk ID band on patient, Provide patient/family education based on risk assessment, Educate patient/family to call staff for assistance when getting out of bed, Place fall precaution signage outside patient door, Utilize bed/chair fall alarm, Notify charge of high risk for huddle, and Bed alarm connected correctly  Bed type: Low air loss (Jose Score less than 17 interventions in place)  Patient on cardiac monitor: Yes  IVF/IV medications: Not Applicable   Oxygen: How many liters 3L, Traced the line to wall oxygen, and No oxygen tank in room  Bedside sitter: Not Applicable   Isolation: Not applicable

## 2024-11-15 NOTE — CARE PLAN
The patient is Stable - Low risk of patient condition declining or worsening    Shift Goals  Clinical Goals: IV abx, pain control, HDS  Patient Goals: pain control, comfort  Family Goals: em    Progress made toward(s) clinical / shift goals:    Problem: Pain - Standard  Goal: Alleviation of pain or a reduction in pain to the patient’s comfort goal  Outcome: Progressing. Treated per mar      Problem: Knowledge Deficit - Standard  Goal: Patient and family/care givers will demonstrate understanding of plan of care, disease process/condition, diagnostic tests and medications  Outcome: Progressing     Problem: Fall Risk  Goal: Patient will remain free from falls  Outcome: Progressing     Problem: Skin Integrity  Goal: Skin integrity is maintained or improved  Outcome: Progressing. Q2 turns     Problem: Infection  Goal: Will remain free from infection  Outcome: Progressing     Problem: Fluid Volume:  Goal: Will maintain balanced intake and output  Outcome: Progressing     Problem: Respiratory:  Goal: Respiratory status will improve  Outcome: Progressing. 3L NC. Will wean as able      Problem: Urinary Elimination:  Goal: Ability to reestablish a normal urinary elimination pattern will improve  Outcome: Progressing. Straight cathed x1 so far this shift        Patient is not progressing towards the following goals:

## 2024-11-16 LAB
ALBUMIN SERPL BCP-MCNC: 2.6 G/DL (ref 3.2–4.9)
ALBUMIN/GLOB SERPL: 0.8 G/DL
ALP SERPL-CCNC: 144 U/L (ref 30–99)
ALT SERPL-CCNC: 24 U/L (ref 2–50)
ANION GAP SERPL CALC-SCNC: 12 MMOL/L (ref 7–16)
AST SERPL-CCNC: 41 U/L (ref 12–45)
BILIRUB SERPL-MCNC: 0.2 MG/DL (ref 0.1–1.5)
BUN SERPL-MCNC: 13 MG/DL (ref 8–22)
CALCIUM ALBUM COR SERPL-MCNC: 9.2 MG/DL (ref 8.5–10.5)
CALCIUM SERPL-MCNC: 8.1 MG/DL (ref 8.5–10.5)
CHLORIDE SERPL-SCNC: 94 MMOL/L (ref 96–112)
CO2 SERPL-SCNC: 26 MMOL/L (ref 20–33)
CREAT SERPL-MCNC: 0.69 MG/DL (ref 0.5–1.4)
ERYTHROCYTE [DISTWIDTH] IN BLOOD BY AUTOMATED COUNT: 45.1 FL (ref 35.9–50)
GFR SERPLBLD CREATININE-BSD FMLA CKD-EPI: 86 ML/MIN/1.73 M 2
GLOBULIN SER CALC-MCNC: 3.4 G/DL (ref 1.9–3.5)
GLUCOSE BLD STRIP.AUTO-MCNC: 148 MG/DL (ref 65–99)
GLUCOSE BLD STRIP.AUTO-MCNC: 161 MG/DL (ref 65–99)
GLUCOSE BLD STRIP.AUTO-MCNC: 204 MG/DL (ref 65–99)
GLUCOSE BLD STRIP.AUTO-MCNC: 239 MG/DL (ref 65–99)
GLUCOSE SERPL-MCNC: 203 MG/DL (ref 65–99)
HCT VFR BLD AUTO: 40.4 % (ref 37–47)
HGB BLD-MCNC: 13.2 G/DL (ref 12–16)
MCH RBC QN AUTO: 29.6 PG (ref 27–33)
MCHC RBC AUTO-ENTMCNC: 32.7 G/DL (ref 32.2–35.5)
MCV RBC AUTO: 90.6 FL (ref 81.4–97.8)
PLATELET # BLD AUTO: 394 K/UL (ref 164–446)
PMV BLD AUTO: 9.4 FL (ref 9–12.9)
POTASSIUM SERPL-SCNC: 4 MMOL/L (ref 3.6–5.5)
PROT SERPL-MCNC: 6 G/DL (ref 6–8.2)
RBC # BLD AUTO: 4.46 M/UL (ref 4.2–5.4)
SODIUM SERPL-SCNC: 132 MMOL/L (ref 135–145)
WBC # BLD AUTO: 10.6 K/UL (ref 4.8–10.8)

## 2024-11-16 PROCEDURE — 80053 COMPREHEN METABOLIC PANEL: CPT

## 2024-11-16 PROCEDURE — A9270 NON-COVERED ITEM OR SERVICE: HCPCS

## 2024-11-16 PROCEDURE — 85027 COMPLETE CBC AUTOMATED: CPT

## 2024-11-16 PROCEDURE — 700102 HCHG RX REV CODE 250 W/ 637 OVERRIDE(OP): Performed by: GENERAL PRACTICE

## 2024-11-16 PROCEDURE — 700102 HCHG RX REV CODE 250 W/ 637 OVERRIDE(OP)

## 2024-11-16 PROCEDURE — 700102 HCHG RX REV CODE 250 W/ 637 OVERRIDE(OP): Performed by: STUDENT IN AN ORGANIZED HEALTH CARE EDUCATION/TRAINING PROGRAM

## 2024-11-16 PROCEDURE — A9270 NON-COVERED ITEM OR SERVICE: HCPCS | Performed by: STUDENT IN AN ORGANIZED HEALTH CARE EDUCATION/TRAINING PROGRAM

## 2024-11-16 PROCEDURE — 36415 COLL VENOUS BLD VENIPUNCTURE: CPT

## 2024-11-16 PROCEDURE — 700105 HCHG RX REV CODE 258: Performed by: INTERNAL MEDICINE

## 2024-11-16 PROCEDURE — 82962 GLUCOSE BLOOD TEST: CPT | Mod: 91

## 2024-11-16 PROCEDURE — A9270 NON-COVERED ITEM OR SERVICE: HCPCS | Performed by: GENERAL PRACTICE

## 2024-11-16 PROCEDURE — 700111 HCHG RX REV CODE 636 W/ 250 OVERRIDE (IP): Mod: JZ | Performed by: INTERNAL MEDICINE

## 2024-11-16 PROCEDURE — 770006 HCHG ROOM/CARE - MED/SURG/GYN SEMI*

## 2024-11-16 PROCEDURE — 99233 SBSQ HOSP IP/OBS HIGH 50: CPT | Performed by: INTERNAL MEDICINE

## 2024-11-16 RX ADMIN — SENNOSIDES AND DOCUSATE SODIUM 2 TABLET: 50; 8.6 TABLET ORAL at 05:26

## 2024-11-16 RX ADMIN — AMLODIPINE BESYLATE 10 MG: 10 TABLET ORAL at 17:14

## 2024-11-16 RX ADMIN — PIPERACILLIN AND TAZOBACTAM 3.38 G: 3; .375 INJECTION, POWDER, FOR SOLUTION INTRAVENOUS at 12:35

## 2024-11-16 RX ADMIN — LOSARTAN POTASSIUM 100 MG: 50 TABLET, FILM COATED ORAL at 05:26

## 2024-11-16 RX ADMIN — INSULIN LISPRO 4 UNITS: 100 INJECTION, SOLUTION INTRAVENOUS; SUBCUTANEOUS at 17:20

## 2024-11-16 RX ADMIN — INSULIN LISPRO 4 UNITS: 100 INJECTION, SOLUTION INTRAVENOUS; SUBCUTANEOUS at 20:31

## 2024-11-16 RX ADMIN — OXYCODONE 5 MG: 5 TABLET ORAL at 19:56

## 2024-11-16 RX ADMIN — OXYCODONE 5 MG: 5 TABLET ORAL at 11:38

## 2024-11-16 RX ADMIN — POLYETHYLENE GLYCOL 3350 1 PACKET: 17 POWDER, FOR SOLUTION ORAL at 05:26

## 2024-11-16 RX ADMIN — INSULIN LISPRO 3 UNITS: 100 INJECTION, SOLUTION INTRAVENOUS; SUBCUTANEOUS at 08:00

## 2024-11-16 RX ADMIN — PIPERACILLIN AND TAZOBACTAM 3.38 G: 3; .375 INJECTION, POWDER, FOR SOLUTION INTRAVENOUS at 05:33

## 2024-11-16 RX ADMIN — MICONAZOLE NITRATE: 2 CREAM TOPICAL at 09:02

## 2024-11-16 RX ADMIN — METOPROLOL TARTRATE 50 MG: 50 TABLET, FILM COATED ORAL at 17:13

## 2024-11-16 RX ADMIN — SIMVASTATIN 20 MG: 40 TABLET, FILM COATED ORAL at 20:36

## 2024-11-16 RX ADMIN — MICONAZOLE NITRATE: 2 CREAM TOPICAL at 17:23

## 2024-11-16 RX ADMIN — METOPROLOL TARTRATE 50 MG: 50 TABLET, FILM COATED ORAL at 05:26

## 2024-11-16 ASSESSMENT — PAIN DESCRIPTION - PAIN TYPE
TYPE: ACUTE PAIN

## 2024-11-16 ASSESSMENT — PATIENT HEALTH QUESTIONNAIRE - PHQ9
1. LITTLE INTEREST OR PLEASURE IN DOING THINGS: NOT AT ALL
2. FEELING DOWN, DEPRESSED, IRRITABLE, OR HOPELESS: NOT AT ALL
SUM OF ALL RESPONSES TO PHQ9 QUESTIONS 1 AND 2: 0

## 2024-11-16 NOTE — PROGRESS NOTES
Report called to floor. Patient to floor with transport in stable condition. VSS. Surgical dressings clean dry intact. Aox4 and on 4 l O2. No belongings. No further needs.

## 2024-11-16 NOTE — PROGRESS NOTES
"Hospital Medicine Daily Progress Note    Date of Service  11/14/2024    Chief Complaint  Bobbi Rousseau is a 84 y.o. female admitted 11/12/2024 with abdominal pain    Hospital Course  This is an 84-year-old female with past medical history of hypertension, dyslipidemia, type 2 diabetes, hypothyroidism, chronic hypoxemic respiratory failure on 2 to 3 L nocturnal, constipation and history of urinary retention who was admitted on 11/12/2024 with worsening back pain.    She was recently admitted at [facility] from 10/24 - 10/29 following a ground-level fall.  CT scan at the time showed an L1 burst fracture, she was discharged to rehab.    CT abdomen/pelvis with contrast was done which showed \"L1 burst fracture with 4 mm retropulsion... Diffuse gallbladder wall thickening with a dilated gallbladder measuring 5.4 cm in diameter and 0.1 cm in length.  Prominent adjacent inflammatory changes noted.  Findings are favored to relate to cholecystitis.  No evidence of gallbladder perforation or abscess.... Urinary bladder distention. \"  RUQ US -noted cholelithiasis, gallbladder sludge, thickened gallbladder, sonographic signs compatible with cholecystitis.  Patient started on Rocephin and Flagyl.  Patient underwent laparoscopic cholecystectomy on 11/15 by Dr. Tena    Urinalysis significant for UTI, patient with significan urinary retention requiring Navarrete catheter placement. Treated with Rocephin.    Lumbar MRI noted moderate canal narrowing at L4-L5 with moderate left lateral recess stenosis.  Superior endplate compression fracture at L1 with extensive bone marrow edema and a fluid filled vertebral body collapse subjacent to the superior endplate.  Approximately about 30% loss of height with minimal posterior superior cortical retropulsion without cauda equina compression. Plan to manage non operatively with pain control and PT OT.    On 11/13 AM, patient noted to have new onset atrial fibrillation with RVR.  " Started on metoprolol, echocardiogram reveals LVEF 65% and no significant valvular abnormality. Patient will be started on Eliquis.        Interval Problem Update  Patient continues to report neck pain worse with movement.  She is currently resting in bed.  She denies any abdominal pain, nausea or vomiting.  She has been tolerating a clear liquid diet.  Patient is status post cholecystectomy, will dc antibiotics.  PT OT.  Plan is for patient to discharge to SNF for ongoing PT OT.    I reviewed the chart along with vitals, labs, imaging, test (both pending and resulted) and recommendations from specialists and interdisciplinary team.  I have discussed this patient's plan of care and discharge plan at IDT rounds today with Case Management, Nursing, Nursing leadership, and other members of the IDT team.    Consultants/Specialty  general surgery    Code Status  DNAR/DNI    Disposition  Patient is not medically cleared to return back to SNF.    I have placed the appropriate orders for post-discharge needs.    Review of Systems  Review of Systems   All other systems reviewed and are negative.       Physical Exam  Temp:  [36.1 °C (97 °F)-36.7 °C (98 °F)] 36.2 °C (97.1 °F)  Pulse:  [76-86] 76  Resp:  [8-24] 16  BP: (103-130)/(59-82) 109/66  SpO2:  [91 %-98 %] 92 %    Physical Exam  Vitals and nursing note reviewed.   Constitutional:       General: She is not in acute distress.  HENT:      Head: Normocephalic and atraumatic.      Mouth/Throat:      Mouth: Mucous membranes are moist.      Pharynx: No oropharyngeal exudate.   Eyes:      Extraocular Movements: Extraocular movements intact.      Pupils: Pupils are equal, round, and reactive to light.   Cardiovascular:      Rate and Rhythm: Normal rate and regular rhythm.      Pulses: Normal pulses.      Heart sounds: No murmur heard.     No friction rub. No gallop.   Pulmonary:      Effort: Pulmonary effort is normal. No respiratory distress.      Breath sounds: No wheezing,  rhonchi or rales.   Abdominal:      General: Bowel sounds are normal. There is no distension.      Palpations: Abdomen is soft. There is no mass.      Tenderness: There is abdominal tenderness (RUQ tenderness).   Musculoskeletal:         General: No swelling or tenderness. Normal range of motion.      Cervical back: Normal range of motion. No rigidity. No muscular tenderness.      Right lower leg: No edema.      Left lower leg: No edema.   Skin:     General: Skin is warm and dry.      Capillary Refill: Capillary refill takes less than 2 seconds.      Findings: No erythema or rash.   Neurological:      General: No focal deficit present.      Mental Status: She is alert and oriented to person, place, and time.      Motor: No weakness.      Gait: Gait normal.         Fluids    Intake/Output Summary (Last 24 hours) at 11/16/2024 1518  Last data filed at 11/16/2024 0600  Gross per 24 hour   Intake 2070 ml   Output 650 ml   Net 1420 ml        Laboratory  Recent Labs     11/14/24  0137 11/15/24  1233 11/16/24  0126   WBC 11.5* 8.8 10.6   RBC 4.78 4.61 4.46   HEMOGLOBIN 14.1 13.4 13.2   HEMATOCRIT 42.2 41.1 40.4   MCV 88.3 89.2 90.6   MCH 29.5 29.1 29.6   MCHC 33.4 32.6 32.7   RDW 43.9 44.3 45.1   PLATELETCT 350 398 394   MPV 9.5 9.5 9.4     Recent Labs     11/14/24  0137 11/15/24  1233 11/16/24  0126   SODIUM 129* 130* 132*   POTASSIUM 3.4* 3.5* 4.0   CHLORIDE 93* 93* 94*   CO2 25 26 26   GLUCOSE 169* 183* 203*   BUN 11 9 13   CREATININE 0.59 0.66 0.69   CALCIUM 8.6 8.3* 8.1*                   Imaging  EC-ECHOCARDIOGRAM COMPLETE W/O CONT   Final Result      DX-CHEST-PORTABLE (1 VIEW)   Final Result      LEFT basilar opacity suspicious for pneumonia. Pleural effusion could be present.      MR-LUMBAR SPINE-W/O   Final Result         Moderate canal narrowing at L4-5 with moderate left lateral recess stenosis.      Superior endplate compression fracture at L1 with extensive bone marrow edema and a fluid-filled vertebral body  cleft subadjacent to the superior endplate. There is approximately 30% loss of height with minimal posterior superior cortical retropulsion    but without cauda equina compression.      Mild edema along the superior endplate of L4 may represent a noncompressive bone contusion.         US-RUQ   Final Result         1.  Cholelithiasis and gallbladder sludge with thickened gallbladder wall, sonographically compatible with cholecystitis.   2.  Hepatomegaly   3.  Echogenic liver, compatible with fatty change versus fibrosis.   4.  Echogenic right kidney, nonspecific but can be associated with medical renal disease.      CT-OUTSIDE IMAGES-CT ABDOMEN/PELVIS   Final Result      CT-OUTSIDE IMAGES-CT LUMBAR SPINE   Final Result      CT-OUTSIDE IMAGES-CT CHEST/ABDOMEN/PELVIS   Final Result           Assessment/Plan  ** Closed stable burst fracture of first lumbar vertebra (HCC)- (present on admission)  Assessment & Plan  Patient fell and admitted at [facility] from 10/24 - 10/29 following CT scan showing L1 burst fracture  She was discharged to rehab and has been having persistent constipation and also found to have urinary retention  Lumbar MRI noted moderate canal narrowing at L4-L5 with moderate left lateral recess stenosis.  Superior endplate compression fracture at L1 with extensive bone marrow edema and a fluid filled vertebral body collapse subjacent to the superior endplate.  Approximately about 30% loss of height with minimal posterior superior cortical retropulsion without cauda equina compression.  Continue narcotic pain management  PT, OT    Hypophosphatemia  Assessment & Plan  Oral Supplementation  Serial BMP, magnesium, phosphorus levels ordered for tomorrow morning to continue to monitor electrolytes       Atrial fibrillation with RVR (HCC)  Assessment & Plan  New onset 11/13 a.m.  Started on metoprolol  Echocardiogram pending  [id number]  Vitals:    11/15/24 1623   BP: 130/61   Pulse: 80   Resp: 20   Temp: 36.5  °C (97.7 °F)   SpO2: 97%         Generalized abdominal pain  Assessment & Plan  Concern for possible cholecystitis on outside imaging CT and right upper quadrant ultrasound  Covered empirically with antibiotic therapy  General surgery consulted     Hyponatremia- (present on admission)  Assessment & Plan  119 at outside facility  Unclear etiology, she does appear euvolemic.  Slow rate IV fluids  Urine studies ordered  BMP ordered every 8 to continue monitoring and avoid overcorrection    Recent Labs     11/13/24  1543 11/14/24  0137 11/15/24  1233   SODIUM 128* 129* 130*   POTASSIUM 3.6 3.4* 3.5*   CHLORIDE 91* 93* 93*   CO2 25 25 26   GLUCOSE 208* 169* 183*   BUN 12 11 9   CREATININE 0.65 0.59 0.66     Not sure re: rate of rise of sodium levels at the OSH, stable here.    Constipation- (present on admission)  Assessment & Plan  She has been on narcotics for recent L1 fracture.  Bowel protocol    Dyslipidemia- (present on admission)  Assessment & Plan  Continue statin    Primary hypertension- (present on admission)  Assessment & Plan  Continue losartan  Vitals:    11/15/24 1623   BP: 130/61   Pulse: 80   Resp: 20   Temp: 36.5 °C (97.7 °F)   SpO2: 97%     Stable    Type 2 diabetes mellitus with hyperglycemia, with long-term current use of insulin (HCC)- (present on admission)  Assessment & Plan  Continue glargine, sliding scale added while in the hospital.  Hold home orals  A1c 8.1 slioghtly worse than prior  On insulin    Acquired hypothyroidism- (present on admission)  Assessment & Plan  Continue levothyroxine    Acute cystitis without hematuria- (present on admission)  Assessment & Plan  UA outside facility showed moderate bacteria started on ceftriaxone and metronidazole prior to transfer  Day team to follow-up with UA cultures from outside facility.  Repeat UA here ordered  Treated with IV abx    Urinary retention- (present on admission)  Assessment & Plan  Navarrete catheter placed prior to transfer from [facility] with  2 L of urine immediately drained  She has been on narcotics for recent fracture  Due to urinary retention as well as constipation there is concern for cauda equina syndrome or other neurologic abnormality following recent L1 burst fracture - RULED OUT          VTE prophylaxis: Lovenox    I have performed a physical exam and reviewed and updated ROS and Plan today (11/16/2024). In review of yesterday's note (11/15/2024), there are no changes except as documented above.    I spent 51 minutes, reviewing the chart, obtaining and/or reviewing separately obtained history. Performing a medically appropriate examination and evaluation.  Counseling and educating the patient. Ordering and reviewing medications, tests, or procedures. Documenting clinical information in EPIC. Independently interpreting results and communicating results to patient. Discussing future disposition of care with patient, RN and case management.    I

## 2024-11-16 NOTE — PROGRESS NOTES
"Hospital Medicine Daily Progress Note    Date of Service  11/14/2024    Chief Complaint  Bobbi Rousseau is a 84 y.o. female admitted 11/12/2024 with abdominal pain    Hospital Course  This is an 84-year-old female with past medical history of hypertension, dyslipidemia, type 2 diabetes, hypothyroidism, chronic hypoxemic respiratory failure on 2 to 3 L nocturnal, constipation and history of urinary retention who was admitted on 11/12/2024 with worsening back pain.    She was recently admitted at [facility] from 10/24 - 10/29 following a ground-level fall.  CT scan at the time showed an L1 burst fracture, she was discharged to rehab.    CT abdomen/pelvis with contrast was done which showed \"L1 burst fracture with 4 mm retropulsion... Diffuse gallbladder wall thickening with a dilated gallbladder measuring 5.4 cm in diameter and 0.1 cm in length.  Prominent adjacent inflammatory changes noted.  Findings are favored to relate to cholecystitis.  No evidence of gallbladder perforation or abscess.... Urinary bladder distention. \"  RUQ US -noted cholelithiasis, gallbladder sludge, thickened gallbladder, sonographic signs compatible with cholecystitis.  Patient started on Rocephin and Flagyl.     Urinalysis significant for UTI, patient with significan urinary retention requiring Navarrete catheter placement. Started on Rocephin.    Lumbar MRI noted moderate canal narrowing at L4-L5 with moderate left lateral recess stenosis.  Superior endplate compression fracture at L1 with extensive bone marrow edema and a fluid filled vertebral body collapse subjacent to the superior endplate.  Approximately about 30% loss of height with minimal posterior superior cortical retropulsion without cauda equina compression.    On 11/13 AM, patient noted to have new onset atrial fibrillation with RVR.  Started on metoprolol, echocardiogram pending.    Interval Problem Update  Patient seen and examine at bedside  Managing chronic L1 burst " "fracture which imaging described no cauda equina and as stable (recent October 20204 [facility] hospitalization for this and was discharged to rehab), par Afib w/ RVR NOT on anticoagulation, cholecystitis, UTI, in the setting of insulin dependent diabetes, and is also on chronic O2 has ONUR on CPAP. Had severe hyponatremia at OSH (Na 119).. CT abdomen/pelvis showed \"L1 burst fracture with 4 mm retropulsion, diffuse gallbladder wall thickening with a dilated gallbladder measuring 5.4 cm in diameter and 0.1 cm in length. Prominent adjacent inflammatory changes noted. Findings are favored to relate to cholecystitis. No evidence of gallbladder perforation or abscess; urinary bladder distention. RUQ US also confirmed cholecystitis. Dr. Wall, Surgery consulted. Anticipates cholecystectomy prior to discharge, they recommend medical optimization first for his multiple comorbidities. Afib appears to be new onset and possibly paroxysmal.  Nonoperative management for L1 burst fracture as before, \"cauda equina ruled out\". On Rocephin and Flagyl empirically. Surgery consulted for cholecystectomy. They mentioned they are waiting for echo before procedure. Currently no syncope, respiratory failure, decompensated heart failure or malignant arrhythmia to postopone surgery. HR stable at 88, Echo came back normal EF.  BG 200s, ordered A1c came back 8.1, on SS insulin.    Stable no belly pain.Anticipating cholecystectomy today. She consented for the procedure. Discussed with Dr. Tena at bedside. On previous discussions with Cardiology, new onset par Afib will still require anticoagulation. CHADSVASc is 5. HOLDING anticoagulation at this time as patient is planned for cholecystectomy TODAY.   PT/OT and return to rehab when cleared.  I reviewed the chart along with vitals, labs, imaging, test (both pending and resulted) and recommendations from specialists and interdisciplinary team.  I have discussed this patient's plan of care and " discharge plan at IDT rounds today with Case Management, Nursing, Nursing leadership, and other members of the IDT team.    Consultants/Specialty  general surgery    Code Status  DNAR/DNI    Disposition  Patient is not medically cleared to return back to SNF.    I have placed the appropriate orders for post-discharge needs.    Review of Systems  Review of Systems   All other systems reviewed and are negative.       Physical Exam  Temp:  [35.7 °C (96.2 °F)-36.7 °C (98 °F)] 35.7 °C (96.2 °F)  Pulse:  [76-89] 79  Resp:  [15-16] 16  BP: (107-128)/(65-79) 107/65  SpO2:  [90 %-96 %] 92 %    Physical Exam  Vitals and nursing note reviewed.   Constitutional:       General: She is not in acute distress.  HENT:      Head: Normocephalic and atraumatic.      Mouth/Throat:      Mouth: Mucous membranes are moist.      Pharynx: No oropharyngeal exudate.   Eyes:      Extraocular Movements: Extraocular movements intact.      Pupils: Pupils are equal, round, and reactive to light.   Cardiovascular:      Rate and Rhythm: Normal rate and regular rhythm.      Pulses: Normal pulses.      Heart sounds: No murmur heard.     No friction rub. No gallop.   Pulmonary:      Effort: Pulmonary effort is normal. No respiratory distress.      Breath sounds: No wheezing, rhonchi or rales.   Abdominal:      General: Bowel sounds are normal. There is no distension.      Palpations: Abdomen is soft. There is no mass.      Tenderness: There is abdominal tenderness (RUQ tenderness).   Musculoskeletal:         General: No swelling or tenderness. Normal range of motion.      Cervical back: Normal range of motion. No rigidity. No muscular tenderness.      Right lower leg: No edema.      Left lower leg: No edema.   Skin:     General: Skin is warm and dry.      Capillary Refill: Capillary refill takes less than 2 seconds.      Findings: No erythema or rash.   Neurological:      General: No focal deficit present.      Mental Status: She is alert and oriented  to person, place, and time.      Motor: No weakness.      Gait: Gait normal.         Fluids    Intake/Output Summary (Last 24 hours) at 11/15/2024 1618  Last data filed at 11/15/2024 1608  Gross per 24 hour   Intake 1230 ml   Output 750 ml   Net 480 ml        Laboratory  Recent Labs     11/13/24  0512 11/14/24  0137 11/15/24  1233   WBC 12.6* 11.5* 8.8   RBC 4.69 4.78 4.61   HEMOGLOBIN 14.0 14.1 13.4   HEMATOCRIT 41.2 42.2 41.1   MCV 87.8 88.3 89.2   MCH 29.9 29.5 29.1   MCHC 34.0 33.4 32.6   RDW 43.6 43.9 44.3   PLATELETCT 384 350 398   MPV 9.7 9.5 9.5     Recent Labs     11/13/24  1543 11/14/24  0137 11/15/24  1233   SODIUM 128* 129* 130*   POTASSIUM 3.6 3.4* 3.5*   CHLORIDE 91* 93* 93*   CO2 25 25 26   GLUCOSE 208* 169* 183*   BUN 12 11 9   CREATININE 0.65 0.59 0.66   CALCIUM 8.8 8.6 8.3*                   Imaging  EC-ECHOCARDIOGRAM COMPLETE W/O CONT   Final Result      DX-CHEST-PORTABLE (1 VIEW)   Final Result      LEFT basilar opacity suspicious for pneumonia. Pleural effusion could be present.      MR-LUMBAR SPINE-W/O   Final Result         Moderate canal narrowing at L4-5 with moderate left lateral recess stenosis.      Superior endplate compression fracture at L1 with extensive bone marrow edema and a fluid-filled vertebral body cleft subadjacent to the superior endplate. There is approximately 30% loss of height with minimal posterior superior cortical retropulsion    but without cauda equina compression.      Mild edema along the superior endplate of L4 may represent a noncompressive bone contusion.         US-RUQ   Final Result         1.  Cholelithiasis and gallbladder sludge with thickened gallbladder wall, sonographically compatible with cholecystitis.   2.  Hepatomegaly   3.  Echogenic liver, compatible with fatty change versus fibrosis.   4.  Echogenic right kidney, nonspecific but can be associated with medical renal disease.      CT-OUTSIDE IMAGES-CT ABDOMEN/PELVIS   Final Result      CT-OUTSIDE  IMAGES-CT LUMBAR SPINE   Final Result      CT-OUTSIDE IMAGES-CT CHEST/ABDOMEN/PELVIS   Final Result           Assessment/Plan  ** Closed stable burst fracture of first lumbar vertebra (HCC)- (present on admission)  Assessment & Plan  Patient fell and admitted at [facility] from 10/24 - 10/29 following CT scan showing L1 burst fracture  She was discharged to rehab and has been having persistent constipation and also found to have urinary retention  Lumbar MRI noted moderate canal narrowing at L4-L5 with moderate left lateral recess stenosis.  Superior endplate compression fracture at L1 with extensive bone marrow edema and a fluid filled vertebral body collapse subjacent to the superior endplate.  Approximately about 30% loss of height with minimal posterior superior cortical retropulsion without cauda equina compression.  Continue narcotic pain management  PT, OT    Hypophosphatemia  Assessment & Plan  Oral Supplementation  Serial BMP, magnesium, phosphorus levels ordered for tomorrow morning to continue to monitor electrolytes       Atrial fibrillation with RVR (HCC)  Assessment & Plan  New onset 11/13 a.m.  Started on metoprolol  Echocardiogram pending  [id number]  Vitals:    11/15/24 1623   BP: 130/61   Pulse: 80   Resp: 20   Temp: 36.5 °C (97.7 °F)   SpO2: 97%         Generalized abdominal pain  Assessment & Plan  Concern for possible cholecystitis on outside imaging CT and right upper quadrant ultrasound  Covered empirically with antibiotic therapy  General surgery consulted     Hyponatremia- (present on admission)  Assessment & Plan  119 at outside facility  Unclear etiology, she does appear euvolemic.  Slow rate IV fluids  Urine studies ordered  BMP ordered every 8 to continue monitoring and avoid overcorrection    Recent Labs     11/13/24  1543 11/14/24  0137 11/15/24  1233   SODIUM 128* 129* 130*   POTASSIUM 3.6 3.4* 3.5*   CHLORIDE 91* 93* 93*   CO2 25 25 26   GLUCOSE 208* 169* 183*   BUN 12 11 9    CREATININE 0.65 0.59 0.66     Not sure re: rate of rise of sodium levels at the OSH, stable here.    Constipation- (present on admission)  Assessment & Plan  She has been on narcotics for recent L1 fracture.  Bowel protocol    Dyslipidemia- (present on admission)  Assessment & Plan  Continue statin    Primary hypertension- (present on admission)  Assessment & Plan  Continue losartan  Vitals:    11/15/24 1623   BP: 130/61   Pulse: 80   Resp: 20   Temp: 36.5 °C (97.7 °F)   SpO2: 97%     Stable    Type 2 diabetes mellitus with hyperglycemia, with long-term current use of insulin (HCC)- (present on admission)  Assessment & Plan  Continue glargine, sliding scale added while in the hospital.  Hold home orals  A1c 8.1 slioghtly worse than prior  On insulin    Acquired hypothyroidism- (present on admission)  Assessment & Plan  Continue levothyroxine    Acute cystitis without hematuria- (present on admission)  Assessment & Plan  UA outside facility showed moderate bacteria started on ceftriaxone and metronidazole prior to transfer  Day team to follow-up with UA cultures from outside facility.  Repeat UA here ordered  We will continue with abx    Urinary retention- (present on admission)  Assessment & Plan  Navarrete catheter placed prior to transfer from [place] with 2 L of urine immediately drained  She has been on narcotics for recent fracture  Due to urinary retention as well as constipation there is concern for cauda equina syndrome or other neurologic abnormality following recent L1 burst fracture - RULED OUT          VTE prophylaxis: Lovenox    I have performed a physical exam and reviewed and updated ROS and Plan today (11/15/2024). In review of yesterday's note (11/14/2024), there are no changes except as documented above.    Patient is has a high medical complexity, complex decision making and is at high risk for complication, morbidity, and mortality, thus requiring the highest level of my preparedness for sudden,  emergent intervention. Medical decision making is therefore complex. I provided  services, which included ordering labs and/or imaging, and discussing the case with various consultants.medication orders, frequent reevaluations of the patient's condition and response to treatment. Time was also devoted to counseling and coordinating care including review of records, pertinent lab data and studies, as well as discussing diagnostic evaluation and work up, planned therapeutic interventions and future disposition of care. Where indicated, the assessment and plan reflect discussion of patient with consultants, other healthcare providers, family members, and additional research needed to obtain further information in formulating the plan of care for Bobbi Rousseau. Total time spent was 65 minutes.   I

## 2024-11-16 NOTE — OR SURGEON
Immediate Post OP Note    PreOp Diagnosis: Acute cholecystitis      PostOp Diagnosis: Necrotizing cholecystitis      Procedure(s):  CHOLECYSTECTOMY, LAPAROSCOPIC - Wound Class: Contaminated    Surgeon(s):  Wes Tena M.D.    Anesthesiologist/Type of Anesthesia:  Anesthesiologist: Manoj Fay M.D./General    Surgical Staff:  Assistant: Naty Taylor P.A.-C.  Circulator: Elena Arenas R.N.  Relief Scrub: Deana Hinojosa  Scrub Person: Gerard Celeste    Specimens removed if any:  ID Type Source Tests Collected by Time Destination   A : gallbladder Tissue Gallbladder PATHOLOGY SPECIMEN Wes Tena M.D. 11/15/2024  3:32 PM        Estimated Blood Loss: 50    Findings: Necrotic friable gallbladder    Complications: None        11/15/2024 5:03 PM Wes Tena M.D.

## 2024-11-16 NOTE — PROGRESS NOTES
Bedside report received from off going RN/tech: Priscilla assumed care of patient.     Fall Risk Score: HIGH RISK  Fall risk interventions in place: Place yellow fall risk ID band on patient, Provide patient/family education based on risk assessment, Educate patient/family to call staff for assistance when getting out of bed, Place fall precaution signage outside patient door, Place patient in room close to nursing station, and Utilize bed/chair fall alarm  Bed type: Low air loss (Jose Score less than 17 interventions in place)  Patient on cardiac monitor: No   IVF/IV medications: Not Applicable   Oxygen: How many liters 3L  Bedside sitter: Not Applicable   Isolation: Not applicable

## 2024-11-16 NOTE — ANESTHESIA POSTPROCEDURE EVALUATION
Patient: Bobbi Rousseau    Procedure Summary       Date: 11/15/24 Room / Location: [place] [address] / SURGERY [facility]    Anesthesia Start: 1424 Anesthesia Stop: 1623    Procedure: CHOLECYSTECTOMY, LAPAROSCOPIC (Abdomen) Diagnosis: (Acute cholecystitis)    Surgeons: Wes Tena M.D. Responsible Provider: Manoj Fay M.D.    Anesthesia Type: general ASA Status: 3            Final Anesthesia Type: general  Last vitals  BP   Blood Pressure : 124/63    Temp   36.5 °C (97.7 °F)    Pulse   82   Resp   (!) 21    SpO2   94 %      Anesthesia Post Evaluation    Patient location during evaluation: PACU  Patient participation: complete - patient participated  Level of consciousness: awake and alert  Pain score: 6    Airway patency: patent  Anesthetic complications: no  Cardiovascular status: hemodynamically stable  Respiratory status: acceptable  Hydration status: euvolemic    PONV: none          No notable events documented.                Statement Selected

## 2024-11-16 NOTE — NON-PROVIDER
This note is intended for the purposes of medical student education and feedback only.   Please refer to the documentation by this patient's assigned medical practitioner for details of care and plans.    Reason for admission: Patient is an 85 yo femal admitted for back pain after L-spine fracture and abdominal pain w/distention & constipation who is now s/p cholecystectomy on 11/15    HD/POD#: Hospital day 5; Post-Op Day 1    SUBJECTIVE  Patient reports little abdominal pain today, stating that most of her pain is in her neck and low back. She reports passing gas but has not yet had a bowel movement. She is tolerating clear liquids at this time.    OBJECTIVE   Vital Signs:  Max 24 hour temp: 98.0 F  Current temp: 98.0 F  Current HR: 80  Current BP: 129/80  Current RR: 17  Current O2 Sat: 98% on 4L NC    Physical Exam:  General: Elderly appearing female of stated age  HEENT: EOMI  Cardiovascular: RRR  Respiratory: Inspiratory effort increased during speech  Abdominal exam: soft, non-tender. Incisions appear clean, dry, and Dermabond is intact  Neurological: Cranial nerves appear grossly intact    Ins/Outs (per chart data):  P/O Intake: 1320 mL  IV Intake: 750 mL  Urine output: 1000 mL      Lab Results:  Recent Labs     11/14/24  0137 11/15/24  1233 11/16/24  0126   WBC 11.5* 8.8 10.6   RBC 4.78 4.61 4.46   HEMOGLOBIN 14.1 13.4 13.2   HEMATOCRIT 42.2 41.1 40.4   MCV 88.3 89.2 90.6   MCH 29.5 29.1 29.6   MCHC 33.4 32.6 32.7   RDW 43.9 44.3 45.1   PLATELETCT 350 398 394   MPV 9.5 9.5 9.4     Recent Labs     11/14/24  0137 11/15/24  1233 11/16/24  0126   SODIUM 129* 130* 132*   POTASSIUM 3.4* 3.5* 4.0   CHLORIDE 93* 93* 94*   CO2 25 26 26   GLUCOSE 169* 183* 203*   BUN 11 9 13   CREATININE 0.59 0.66 0.69   CALCIUM 8.6 8.3* 8.1*         Recent Labs     11/14/24  0137 11/15/24  1233 11/16/24  0126   ASTSGOT 15 16 41   ALTSGPT 5 7 24   TBILIRUBIN 0.5 0.3 0.2   ALKPHOSPHAT 144* 138* 144*   GLOBULIN 3.1 3.4 3.4        Imaging Results:  No new imaging in the last 24hrs    ASSESSMENT/PLAN  #Abdominal Pain   Patient initially presented to [facility] for back pain and reported abdominal pain with distention and constipation x1 week upon initial presentation. CT-abdomen performed at [facility] showed acute cholecystitis; RUQ-US in [facility] ED consistent- surgery consulted.   Patient seen by surgery on HD #2; patient had an episode of afib w/RVR that morning, thus surgery was postponed until echocardiogram and medical optimization occurred. Laparoscopic cholecystectomy performed 11/15 by Dr. Wes Tena without apparent complications.   Patient appears to be recovering well from lap scarlet, incisions are clean and dry with Dermabond intact. Patient tolerating clear liquid diet and passing gas at this time, though she has not had a bowel movement yet. Continue to advance diet as tolerated; bowel regimen in place should constipation continue.     PROPHYLAXIS  DVT: Lovenox (currently held as of 11/16)  GI: Senna-docusate 8.6-50 mg PO BID; Miralax 1 packet Daily PRN    Overseeing Licensed Provider: LISSY Kauffman, Medical Student  [employer] MS3

## 2024-11-16 NOTE — PROGRESS NOTES
4 Eyes Skin Assessment Completed by MÓNICA Us and Tg RN.    Head WDL  Ears WDL  Nose WDL  Mouth WDL  Neck WDL  Breast/Chest Redness and Scar  Shoulder Blades WDL  Spine WDL, Age spots  (R) Arm/Elbow/Hand Bruising  (L) Arm/Elbow/Hand WDL  Abdomen Bruising and Incision  Groin WDL  Scrotum/Coccyx/Buttocks Redness, Blanching, and Excoriation  (R) Leg Scar  (L) Leg Scar  (R) Heel/Foot/Toe Redness, Blanching, and Swelling, Scab on Toe  (L) Heel/Foot/Toe Redness, Blanching, and Swelling          Devices In Places Navarrete      Interventions In Place Heel Mepilex, Pillows, and Q2 Turns    Possible Skin Injury No    Pictures Uploaded Into Epic Yes  Wound Consult Placed N/A  RN Wound Prevention Protocol Ordered Yes

## 2024-11-16 NOTE — CARE PLAN
The patient is Stable - Low risk of patient condition declining or worsening    Shift Goals  Clinical Goals: Pain control <3/10, safety, IV abx  Patient Goals: pain control  Family Goals: em    Progress made toward(s) clinical / shift goals: Patient A&Ox4, cooperative and forgetful at times. Patient on garcia for urinary retention and draining well with no issues. Patient reports pain upon movement and declines prn pain med at this time. Patient agreeable on q2 turns with pillows. Patient remains on oxygen at 4L satting >90%. Bed locked to lowest position, call light in reach and hourly rounding in place.     Patient is not progressing towards the following goals: N/A

## 2024-11-16 NOTE — OP REPORT
DATE OF OPERATION:   11/15/2024     PREOPERATIVE DIAGNOSIS: acute cholecystitis.    POSTOPERATIVE DIAGNOSIS: gangrenous cholecystitis.    PROCEDURE PERFORMED: Laparoscopic cholecystectomy    SURGEON:    Wes Tena M.D.    ASSISTANT:    Naty Taylor PA-C.     ANESTHESIOLOGIST:  Manoj Fay M.D.    ANESTHESIA:   General endotracheal anesthesia.    INDICATIONS: The patient is a 84 year-old woman with clinical and radiographic findings of acute cholecystitis. She is taken to the operating room for a laparoscopic cholecystectomy.     The nature of the surgical procedure warranted additional skilled operative assistance from a licensed Physician Assistant (LISSY). The assistant was present during the entire operation. The surgical assistant performed the following: provided assistance with optimal surgical exposure of the operative field and provided high complexity, subspecialty decision making input.     FINDINGS: Gangrenous cholecystitis necrotic tissue abscess    WOUND CLASSIFICATION:  Class Infection present at the time of surgery (PATOS).    SPECIMEN:    Gallbladder.    ESTIMATED BLOOD LOSS:  50 mL.    PROCEDURE: Following informed consent consent, the patient was properly identified, taken to the operating room and placed in supine position where general endotracheal anesthesia was administered. Intravenous antibiotics were administered by the anesthesiologist in correct time interval. The patient voided prior to surgery. A urinary catheter was not placed. Sequential compression devices were employed. The abdomen was prepped and draped into a sterile field. A timeout was conducted with the full attention and participation of all operating room personnel.    Marcaine 0.5% was used to infiltrate the port sites. An infraumbilical midline incision was made and the subcutaneous tissues spread bluntly. The fascia was elevated and incised.  Sparks port was placed.  Carbon dioxide pneumoperitoneum was  instilled.  A 5 mm 30 degree lens and camera was passed into the peritoneal cavity. An 11 mm port was placed in the epigastric midline under direct vision. Two 5 mm right subcostal ports were placed under direct vision.     Air in the soft tissuesThe gallbladder was identified and elevated. Dissection was carried out to completely expose and delineate the hepatocystic triangle. The critical view was achieved definitively identifying the single cystic duct and single cystic artery entering the gallbladder. These structures were multiply clipped proximally, once distally and divided. The gallbladder was dissected free from the undersurface of the liver using electrocautery and placed within a laparoscopic specimen retrieval bag. The gallbladder was delivered intact from the abdominal cavity and submitted for pathology.  The gallbladder fossa was inspected. Hemostasis was satisfactory.  Gallbladder was necrotic tissue was friable abscess present between the gallbladder and liver     The umbilical port site fascia was approximated using a trocar site closure device with a 0 VICRYL® Plus Antibacterial suture.  Epigastric port site was closed with interrupted 0 Vicryl incisions were irrigated skin was closed with Monocryl and Dermabond was placed.    The patient tolerated the procedure well, and there were no apparent complications. All sponge, needle, and instrument counts were correct on 2 separate occasions. The patient was awakened, extubated, and transferred to  to the post anesthesia care unit (PACU) in satisfactory condition.       ____________________________________     Wes Tena M.D.    DD: 11/15/2024  5:04 PM

## 2024-11-16 NOTE — CARE PLAN
The patient is Stable - Low risk of patient condition declining or worsening    Shift Goals  Clinical Goals: Pain control during shift less than 3/10  Patient Goals: pain control, rest  Family Goals: VICTORIA    Progress made toward(s) clinical / shift goals:  Patient pain level maintained to a theraputic level with PRN medication. Patient is safe, bed in low and locked position, call light within reach, patient calls appropriately. Skin is maintained, offloading and Q2 turns with pillows in place and to protect bony prominences. Patient continues with antibiotics.    Patient is not progressing towards the following goals:

## 2024-11-16 NOTE — ANESTHESIA TIME REPORT
Anesthesia Start and Stop Event Times       Date Time Event    11/15/2024 1405 Ready for Procedure     1424 Anesthesia Start     1623 Anesthesia Stop          Responsible Staff  11/15/24      Name Role Begin End    Manoj Fay M.D. Anesth 1424 1623          Overtime Reason:  no overtime (within assigned shift)    Comments:

## 2024-11-17 LAB
ANION GAP SERPL CALC-SCNC: 9 MMOL/L (ref 7–16)
BUN SERPL-MCNC: 11 MG/DL (ref 8–22)
CALCIUM SERPL-MCNC: 8.6 MG/DL (ref 8.5–10.5)
CHLORIDE SERPL-SCNC: 94 MMOL/L (ref 96–112)
CO2 SERPL-SCNC: 27 MMOL/L (ref 20–33)
CREAT SERPL-MCNC: 0.8 MG/DL (ref 0.5–1.4)
ERYTHROCYTE [DISTWIDTH] IN BLOOD BY AUTOMATED COUNT: 45.2 FL (ref 35.9–50)
GFR SERPLBLD CREATININE-BSD FMLA CKD-EPI: 73 ML/MIN/1.73 M 2
GLUCOSE BLD STRIP.AUTO-MCNC: 210 MG/DL (ref 65–99)
GLUCOSE BLD STRIP.AUTO-MCNC: 223 MG/DL (ref 65–99)
GLUCOSE BLD STRIP.AUTO-MCNC: 248 MG/DL (ref 65–99)
GLUCOSE BLD STRIP.AUTO-MCNC: 258 MG/DL (ref 65–99)
GLUCOSE SERPL-MCNC: 277 MG/DL (ref 65–99)
HCT VFR BLD AUTO: 40.1 % (ref 37–47)
HGB BLD-MCNC: 12.8 G/DL (ref 12–16)
MCH RBC QN AUTO: 29.1 PG (ref 27–33)
MCHC RBC AUTO-ENTMCNC: 31.9 G/DL (ref 32.2–35.5)
MCV RBC AUTO: 91.1 FL (ref 81.4–97.8)
PLATELET # BLD AUTO: 453 K/UL (ref 164–446)
PMV BLD AUTO: 9.1 FL (ref 9–12.9)
POTASSIUM SERPL-SCNC: 3.2 MMOL/L (ref 3.6–5.5)
RBC # BLD AUTO: 4.4 M/UL (ref 4.2–5.4)
SODIUM SERPL-SCNC: 130 MMOL/L (ref 135–145)
WBC # BLD AUTO: 8.1 K/UL (ref 4.8–10.8)

## 2024-11-17 PROCEDURE — 51798 US URINE CAPACITY MEASURE: CPT

## 2024-11-17 PROCEDURE — 82962 GLUCOSE BLOOD TEST: CPT

## 2024-11-17 PROCEDURE — A9270 NON-COVERED ITEM OR SERVICE: HCPCS | Performed by: GENERAL PRACTICE

## 2024-11-17 PROCEDURE — 99232 SBSQ HOSP IP/OBS MODERATE 35: CPT | Performed by: INTERNAL MEDICINE

## 2024-11-17 PROCEDURE — 700111 HCHG RX REV CODE 636 W/ 250 OVERRIDE (IP): Mod: JZ | Performed by: INTERNAL MEDICINE

## 2024-11-17 PROCEDURE — 700102 HCHG RX REV CODE 250 W/ 637 OVERRIDE(OP): Performed by: GENERAL PRACTICE

## 2024-11-17 PROCEDURE — 700102 HCHG RX REV CODE 250 W/ 637 OVERRIDE(OP): Performed by: STUDENT IN AN ORGANIZED HEALTH CARE EDUCATION/TRAINING PROGRAM

## 2024-11-17 PROCEDURE — 700102 HCHG RX REV CODE 250 W/ 637 OVERRIDE(OP): Performed by: INTERNAL MEDICINE

## 2024-11-17 PROCEDURE — 700101 HCHG RX REV CODE 250: Performed by: INTERNAL MEDICINE

## 2024-11-17 PROCEDURE — 770006 HCHG ROOM/CARE - MED/SURG/GYN SEMI*

## 2024-11-17 PROCEDURE — A9270 NON-COVERED ITEM OR SERVICE: HCPCS | Performed by: INTERNAL MEDICINE

## 2024-11-17 PROCEDURE — A9270 NON-COVERED ITEM OR SERVICE: HCPCS

## 2024-11-17 PROCEDURE — A9270 NON-COVERED ITEM OR SERVICE: HCPCS | Performed by: STUDENT IN AN ORGANIZED HEALTH CARE EDUCATION/TRAINING PROGRAM

## 2024-11-17 PROCEDURE — 80048 BASIC METABOLIC PNL TOTAL CA: CPT

## 2024-11-17 PROCEDURE — 700102 HCHG RX REV CODE 250 W/ 637 OVERRIDE(OP)

## 2024-11-17 PROCEDURE — 85027 COMPLETE CBC AUTOMATED: CPT

## 2024-11-17 PROCEDURE — 36415 COLL VENOUS BLD VENIPUNCTURE: CPT

## 2024-11-17 RX ORDER — IBUPROFEN 400 MG/1
600 TABLET, FILM COATED ORAL EVERY 6 HOURS PRN
Status: DISCONTINUED | OUTPATIENT
Start: 2024-11-17 | End: 2024-11-20 | Stop reason: HOSPADM

## 2024-11-17 RX ORDER — POTASSIUM CHLORIDE 1500 MG/1
40 TABLET, EXTENDED RELEASE ORAL ONCE
Status: COMPLETED | OUTPATIENT
Start: 2024-11-17 | End: 2024-11-17

## 2024-11-17 RX ORDER — LIDOCAINE 4 G/G
1 PATCH TOPICAL DAILY
Status: DISCONTINUED | OUTPATIENT
Start: 2024-11-17 | End: 2024-11-20 | Stop reason: HOSPADM

## 2024-11-17 RX ORDER — CYCLOBENZAPRINE HCL 10 MG
10 TABLET ORAL 3 TIMES DAILY PRN
Status: DISCONTINUED | OUTPATIENT
Start: 2024-11-17 | End: 2024-11-20 | Stop reason: HOSPADM

## 2024-11-17 RX ADMIN — INSULIN LISPRO 7 UNITS: 100 INJECTION, SOLUTION INTRAVENOUS; SUBCUTANEOUS at 17:46

## 2024-11-17 RX ADMIN — SIMVASTATIN 20 MG: 40 TABLET, FILM COATED ORAL at 20:57

## 2024-11-17 RX ADMIN — AMLODIPINE BESYLATE 10 MG: 10 TABLET ORAL at 17:50

## 2024-11-17 RX ADMIN — METOPROLOL TARTRATE 50 MG: 50 TABLET, FILM COATED ORAL at 04:43

## 2024-11-17 RX ADMIN — INSULIN LISPRO 4 UNITS: 100 INJECTION, SOLUTION INTRAVENOUS; SUBCUTANEOUS at 12:26

## 2024-11-17 RX ADMIN — MICONAZOLE NITRATE: 2 CREAM TOPICAL at 04:44

## 2024-11-17 RX ADMIN — MICONAZOLE NITRATE: 2 CREAM TOPICAL at 17:51

## 2024-11-17 RX ADMIN — OXYCODONE 5 MG: 5 TABLET ORAL at 08:29

## 2024-11-17 RX ADMIN — CYCLOBENZAPRINE 10 MG: 10 TABLET, FILM COATED ORAL at 12:28

## 2024-11-17 RX ADMIN — LIDOCAINE 1 PATCH: 4 PATCH TOPICAL at 12:28

## 2024-11-17 RX ADMIN — POTASSIUM CHLORIDE 40 MEQ: 1500 TABLET, EXTENDED RELEASE ORAL at 13:57

## 2024-11-17 RX ADMIN — OXYCODONE 5 MG: 5 TABLET ORAL at 18:00

## 2024-11-17 RX ADMIN — METOPROLOL TARTRATE 50 MG: 50 TABLET, FILM COATED ORAL at 17:50

## 2024-11-17 RX ADMIN — INSULIN LISPRO 4 UNITS: 100 INJECTION, SOLUTION INTRAVENOUS; SUBCUTANEOUS at 08:36

## 2024-11-17 RX ADMIN — LOSARTAN POTASSIUM 100 MG: 50 TABLET, FILM COATED ORAL at 04:43

## 2024-11-17 RX ADMIN — ENOXAPARIN SODIUM 40 MG: 100 INJECTION SUBCUTANEOUS at 17:50

## 2024-11-17 RX ADMIN — INSULIN LISPRO 4 UNITS: 100 INJECTION, SOLUTION INTRAVENOUS; SUBCUTANEOUS at 21:04

## 2024-11-17 ASSESSMENT — PAIN DESCRIPTION - PAIN TYPE
TYPE: ACUTE PAIN

## 2024-11-17 NOTE — PROGRESS NOTES
"  DATE: 11/17/2024    Hospital Day 3  admitted with urinary retention and RUQ tenderness .    INTERVAL EVENTS:  Feeling better.  Tolerating diet, + BM.  (+) flatus, (+) BM.  WBC remains normal.      PHYSICAL EXAMINATION:  Vital Signs: /80   Pulse 75   Temp 36.4 °C (97.5 °F) (Temporal)   Resp 17   Ht 1.753 m (5' 9.02\")   Wt 92.5 kg (203 lb 14.8 oz)   SpO2 93%     Physical Exam  Vitals and nursing note reviewed.   Constitutional:       General: She is awake. She is not in acute distress.     Appearance: Normal appearance.      Interventions: Nasal cannula in place.   Pulmonary:      Effort: Prolonged expiration present. No tachypnea, accessory muscle usage, respiratory distress or retractions.   Abdominal:      General: There is no distension.      Palpations: Abdomen is soft.      Tenderness: There is abdominal tenderness in the right upper quadrant.   Neurological:      Mental Status: She is alert.   Psychiatric:         Behavior: Behavior is cooperative.         LABORATORY VALUES:  Recent Labs     11/15/24  1233 11/16/24  0126 11/17/24  0101   WBC 8.8 10.6 8.1   RBC 4.61 4.46 4.40   HEMOGLOBIN 13.4 13.2 12.8   HEMATOCRIT 41.1 40.4 40.1   MCV 89.2 90.6 91.1   MCH 29.1 29.6 29.1   MCHC 32.6 32.7 31.9*   RDW 44.3 45.1 45.2   PLATELETCT 398 394 453*   MPV 9.5 9.4 9.1     Recent Labs     11/15/24  1233 11/16/24  0126 11/17/24  0101   SODIUM 130* 132* 130*   POTASSIUM 3.5* 4.0 3.2*   CHLORIDE 93* 94* 94*   CO2 26 26 27   GLUCOSE 183* 203* 277*   BUN 9 13 11   CREATININE 0.66 0.69 0.80   CALCIUM 8.3* 8.1* 8.6     Recent Labs     11/15/24  1233 11/16/24  0126   ASTSGOT 16 41   ALTSGPT 7 24   TBILIRUBIN 0.3 0.2   ALKPHOSPHAT 138* 144*   GLOBULIN 3.4 3.4            IMAGING:      ASSESSMENT AND PLAN:  Generalized abdominal pain  Assessment & Plan  Clinically tender in the right upper quadrant, consistent with cholecystitis as evidenced by CT and ultrasound imaging.  Antibiotic therapy, trend labs.  11/15 Lap scarlet " - necrotic gallbladder.  Advance diet as tolerated.  ACS Gray service.      - ACS Petar to sign off, please call or reconsult as needed  - Follow up outpatient in 2 weeks for wound check       ____________________________________     Naty Taylor P.A.-C.

## 2024-11-17 NOTE — PROGRESS NOTES
"  DATE: 11/16/2024    Postop day 1 laparoscopic cholecystectomy.    INTERVAL EVENTS:  Bobbi Rousseau receiving care in [place]  She reports feeling improved ongoing back and neck pain she reports abdomen comfortable tolerating diet  Flatus no bowel movement.      PHYSICAL EXAMINATION:  Vital Signs: /60   Pulse 69   Temp 36 °C (96.8 °F) (Temporal)   Resp 17   Ht 1.753 m (5' 9.02\")   Wt 92.5 kg (203 lb 14.8 oz)   SpO2 94%   Physical Exam  Awake alert appropriate  Breathing with ease no cough no distress  Abdomen soft incisions clean Dermabond in place some bruising evident  No guarding no rebound  LABORATORY VALUES:  Recent Labs     11/14/24  0137 11/15/24  1233 11/16/24  0126   WBC 11.5* 8.8 10.6   RBC 4.78 4.61 4.46   HEMOGLOBIN 14.1 13.4 13.2   HEMATOCRIT 42.2 41.1 40.4   MCV 88.3 89.2 90.6   MCH 29.5 29.1 29.6   MCHC 33.4 32.6 32.7   RDW 43.9 44.3 45.1   PLATELETCT 350 398 394   MPV 9.5 9.5 9.4     Recent Labs     11/14/24  0137 11/15/24  1233 11/16/24  0126   SODIUM 129* 130* 132*   POTASSIUM 3.4* 3.5* 4.0   CHLORIDE 93* 93* 94*   CO2 25 26 26   GLUCOSE 169* 183* 203*   BUN 11 9 13   CREATININE 0.59 0.66 0.69   CALCIUM 8.6 8.3* 8.1*     Recent Labs     11/14/24 0137 11/15/24  1233 11/16/24  0126   ASTSGOT 15 16 41   ALTSGPT 5 7 24   TBILIRUBIN 0.5 0.3 0.2   ALKPHOSPHAT 144* 138* 144*   GLOBULIN 3.1 3.4 3.4           IMAGING:  EC-ECHOCARDIOGRAM COMPLETE W/O CONT   Final Result      DX-CHEST-PORTABLE (1 VIEW)   Final Result      LEFT basilar opacity suspicious for pneumonia. Pleural effusion could be present.      MR-LUMBAR SPINE-W/O   Final Result         Moderate canal narrowing at L4-5 with moderate left lateral recess stenosis.      Superior endplate compression fracture at L1 with extensive bone marrow edema and a fluid-filled vertebral body cleft subadjacent to the superior endplate. There is approximately 30% loss of height with minimal posterior superior cortical retropulsion    but " without cauda equina compression.      Mild edema along the superior endplate of L4 may represent a noncompressive bone contusion.         US-RUQ   Final Result         1.  Cholelithiasis and gallbladder sludge with thickened gallbladder wall, sonographically compatible with cholecystitis.   2.  Hepatomegaly   3.  Echogenic liver, compatible with fatty change versus fibrosis.   4.  Echogenic right kidney, nonspecific but can be associated with medical renal disease.      CT-OUTSIDE IMAGES-CT ABDOMEN/PELVIS   Final Result      CT-OUTSIDE IMAGES-CT LUMBAR SPINE   Final Result      CT-OUTSIDE IMAGES-CT CHEST/ABDOMEN/PELVIS   Final Result          ASSESSMENT AND PLAN:  Generalized abdominal pain  Assessment & Plan  Clinically tender in the right upper quadrant, consistent with cholecystitis as evidenced by CT and ultrasound imaging.  Antibiotic therapy, trend labs.  ACS Gray service.             ____________________________________     Wes Tena M.D.    DD: 11/16/2024  5:33 PM

## 2024-11-17 NOTE — CARE PLAN
The patient is Stable - Low risk of patient condition declining or worsening    Shift Goals  Clinical Goals: Pain control at comfort level less than 5/10 within 12 hour shift  Patient Goals: Pain Control, Rest  Family Goals: VICTORIA    Progress made toward(s) clinical / shift goals:  Medicated per MAR for complaints of pain with some relief verbalized. Maintained on O2 at 4 lpm via NC. Able to make needs known, call light placed within easy reach. Turning and repositioning done, refused at times despite education. FC in placed, draining to yellow-colored urine. Hourly rounding done.     Patient is not progressing towards the following goals:      Problem: Pain - Standard  Goal: Alleviation of pain or a reduction in pain to the patient’s comfort goal  Description: Target End Date:  Prior to discharge or change in level of care    Document on Vitals flowsheet    1.  Document pain using the appropriate pain scale per order or unit policy  2.  Educate and implement non-pharmacologic comfort measures (i.e. relaxation, distraction, massage, cold/heat therapy, etc.)  3.  Pain management medications as ordered  4.  Reassess pain after pain med administration per policy  5.  If opiods administered assess patient's response to pain medication is appropriate per POSS sedation scale  6.  Follow pain management plan developed in collaboration with patient and interdisciplinary team (including palliative care or pain specialists if applicable)  Outcome: Not Progressing     Problem: Respiratory:  Goal: Respiratory status will improve  Outcome: Not Progressing     Problem: Urinary Elimination:  Goal: Ability to reestablish a normal urinary elimination pattern will improve  Outcome: Not Progressing

## 2024-11-17 NOTE — DISCHARGE INSTRUCTIONS
Post Operative Discharge Instructions:    1. DIET: Upon discharge from the hospital, you may resume your normal preoperative diet, unless specifically directed otherwise. Depending on how you are feeling and whether you have nausea or not, you may wish to stay with a bland diet for the first few days. However, you can advance this as quickly as you feel ready.    2. ACTIVITIES: After discharge from the hospital, you may resume full routine activities; however, there should be no heavy lifting (greater than 20 pounds or a bag of groceries) and no strenuous activities for at least 2 weeks.    3. DRIVING: You may drive whenever you are off pain medications and are able to perform the activities needed to drive, i.e., turning, bending, twisting, etc.    4. BATHING: You may get the wound wet at any time after leaving the hospital. You may shower, but do not submerge in a bath for at least two weeks.  You have skin glue to the wound, this will fall off on its own, do not pick at it.    5. BOWEL FUNCTION:   After surgery, it is not uncommon for patients to develop either frequent or loose stools after meals. This usually corrects itself after a few days, to a few weeks. If this occurs, do not worry; this will resolve on its own.  Constipation is much more common than loose stools. The cause is the combination of pain medication and decreased activity level and possibly the nature of the surgical procedure performed. If you feel this is occurring, you may use an over-the-counter treatment such as MiraLAX (or Milk of Magnesia, Ex-Lax, Senokot, etc.) until the problem has resolved. Drink plenty of water and try to wean off narcotic pain medications as soon as is comfortable for you.    6. PAIN MEDICATION:   You will be given a prescription for pain medication at discharge. Please take these as directed. It is important to remember not to take medications on an empty stomach as this may cause nausea.  You may also take  over the counter acetaminophen and/or NSAIDS (ibuprofen, Aleve, Advil, Motrin) per the package instructions.  You may also use ice to the wound to decrease pain and swelling. You may alternate 20 minutes on and 20 minutes off with the ice for the first 24-48 hours. Make sure you place a washcloth or towel between the ice pack and your skin.    7.CALL THE [facility] AT [phone] IF YOU HAVE:  (1) Fevers to more than 101F, (2) Unusual chest or leg pain, (3) Drainage or fluid from incision that may be foul smelling, increased tenderness or soreness at the wound or the wound edges are no longer together, redness or swelling at the incision site. Do not hesitate to call with any other questions.

## 2024-11-18 LAB
ANION GAP SERPL CALC-SCNC: 9 MMOL/L (ref 7–16)
BUN SERPL-MCNC: 9 MG/DL (ref 8–22)
CALCIUM SERPL-MCNC: 8.6 MG/DL (ref 8.5–10.5)
CHLORIDE SERPL-SCNC: 99 MMOL/L (ref 96–112)
CO2 SERPL-SCNC: 26 MMOL/L (ref 20–33)
CREAT SERPL-MCNC: 0.56 MG/DL (ref 0.5–1.4)
ERYTHROCYTE [DISTWIDTH] IN BLOOD BY AUTOMATED COUNT: 44.6 FL (ref 35.9–50)
GFR SERPLBLD CREATININE-BSD FMLA CKD-EPI: 91 ML/MIN/1.73 M 2
GLUCOSE BLD STRIP.AUTO-MCNC: 154 MG/DL (ref 65–99)
GLUCOSE BLD STRIP.AUTO-MCNC: 163 MG/DL (ref 65–99)
GLUCOSE BLD STRIP.AUTO-MCNC: 168 MG/DL (ref 65–99)
GLUCOSE BLD STRIP.AUTO-MCNC: 205 MG/DL (ref 65–99)
GLUCOSE SERPL-MCNC: 214 MG/DL (ref 65–99)
HCT VFR BLD AUTO: 40 % (ref 37–47)
HGB BLD-MCNC: 13.2 G/DL (ref 12–16)
MCH RBC QN AUTO: 29.6 PG (ref 27–33)
MCHC RBC AUTO-ENTMCNC: 33 G/DL (ref 32.2–35.5)
MCV RBC AUTO: 89.7 FL (ref 81.4–97.8)
PATHOLOGY CONSULT NOTE: NORMAL
PLATELET # BLD AUTO: 403 K/UL (ref 164–446)
PMV BLD AUTO: 8.9 FL (ref 9–12.9)
POTASSIUM SERPL-SCNC: 4 MMOL/L (ref 3.6–5.5)
RBC # BLD AUTO: 4.46 M/UL (ref 4.2–5.4)
SODIUM SERPL-SCNC: 134 MMOL/L (ref 135–145)
WBC # BLD AUTO: 6.9 K/UL (ref 4.8–10.8)

## 2024-11-18 PROCEDURE — A9270 NON-COVERED ITEM OR SERVICE: HCPCS | Performed by: GENERAL PRACTICE

## 2024-11-18 PROCEDURE — 700102 HCHG RX REV CODE 250 W/ 637 OVERRIDE(OP)

## 2024-11-18 PROCEDURE — 770006 HCHG ROOM/CARE - MED/SURG/GYN SEMI*

## 2024-11-18 PROCEDURE — 51798 US URINE CAPACITY MEASURE: CPT

## 2024-11-18 PROCEDURE — 700101 HCHG RX REV CODE 250: Performed by: INTERNAL MEDICINE

## 2024-11-18 PROCEDURE — 36415 COLL VENOUS BLD VENIPUNCTURE: CPT

## 2024-11-18 PROCEDURE — 99232 SBSQ HOSP IP/OBS MODERATE 35: CPT | Performed by: INTERNAL MEDICINE

## 2024-11-18 PROCEDURE — 80048 BASIC METABOLIC PNL TOTAL CA: CPT

## 2024-11-18 PROCEDURE — 700102 HCHG RX REV CODE 250 W/ 637 OVERRIDE(OP): Performed by: STUDENT IN AN ORGANIZED HEALTH CARE EDUCATION/TRAINING PROGRAM

## 2024-11-18 PROCEDURE — 700102 HCHG RX REV CODE 250 W/ 637 OVERRIDE(OP): Performed by: GENERAL PRACTICE

## 2024-11-18 PROCEDURE — 700111 HCHG RX REV CODE 636 W/ 250 OVERRIDE (IP): Mod: JZ | Performed by: INTERNAL MEDICINE

## 2024-11-18 PROCEDURE — 97535 SELF CARE MNGMENT TRAINING: CPT

## 2024-11-18 PROCEDURE — A9270 NON-COVERED ITEM OR SERVICE: HCPCS | Performed by: INTERNAL MEDICINE

## 2024-11-18 PROCEDURE — A9270 NON-COVERED ITEM OR SERVICE: HCPCS | Performed by: STUDENT IN AN ORGANIZED HEALTH CARE EDUCATION/TRAINING PROGRAM

## 2024-11-18 PROCEDURE — 85027 COMPLETE CBC AUTOMATED: CPT

## 2024-11-18 PROCEDURE — 82962 GLUCOSE BLOOD TEST: CPT

## 2024-11-18 PROCEDURE — A9270 NON-COVERED ITEM OR SERVICE: HCPCS

## 2024-11-18 PROCEDURE — 700102 HCHG RX REV CODE 250 W/ 637 OVERRIDE(OP): Performed by: INTERNAL MEDICINE

## 2024-11-18 RX ORDER — TAMSULOSIN HYDROCHLORIDE 0.4 MG/1
0.4 CAPSULE ORAL
Status: DISCONTINUED | OUTPATIENT
Start: 2024-11-18 | End: 2024-11-20 | Stop reason: HOSPADM

## 2024-11-18 RX ADMIN — INSULIN LISPRO 3 UNITS: 100 INJECTION, SOLUTION INTRAVENOUS; SUBCUTANEOUS at 17:23

## 2024-11-18 RX ADMIN — MICONAZOLE NITRATE: 2 CREAM TOPICAL at 17:18

## 2024-11-18 RX ADMIN — INSULIN LISPRO 4 UNITS: 100 INJECTION, SOLUTION INTRAVENOUS; SUBCUTANEOUS at 08:05

## 2024-11-18 RX ADMIN — SIMVASTATIN 20 MG: 40 TABLET, FILM COATED ORAL at 21:03

## 2024-11-18 RX ADMIN — ENOXAPARIN SODIUM 40 MG: 100 INJECTION SUBCUTANEOUS at 17:17

## 2024-11-18 RX ADMIN — OXYCODONE 5 MG: 5 TABLET ORAL at 05:22

## 2024-11-18 RX ADMIN — METOPROLOL TARTRATE 50 MG: 50 TABLET, FILM COATED ORAL at 05:23

## 2024-11-18 RX ADMIN — MICONAZOLE NITRATE: 2 CREAM TOPICAL at 05:31

## 2024-11-18 RX ADMIN — INSULIN LISPRO 3 UNITS: 100 INJECTION, SOLUTION INTRAVENOUS; SUBCUTANEOUS at 21:02

## 2024-11-18 RX ADMIN — METOPROLOL TARTRATE 50 MG: 50 TABLET, FILM COATED ORAL at 17:16

## 2024-11-18 RX ADMIN — LIDOCAINE 1 PATCH: 4 PATCH TOPICAL at 05:24

## 2024-11-18 RX ADMIN — INSULIN LISPRO 3 UNITS: 100 INJECTION, SOLUTION INTRAVENOUS; SUBCUTANEOUS at 12:53

## 2024-11-18 RX ADMIN — TAMSULOSIN HYDROCHLORIDE 0.4 MG: 0.4 CAPSULE ORAL at 12:54

## 2024-11-18 RX ADMIN — AMLODIPINE BESYLATE 10 MG: 10 TABLET ORAL at 17:16

## 2024-11-18 RX ADMIN — OXYCODONE 5 MG: 5 TABLET ORAL at 18:20

## 2024-11-18 ASSESSMENT — PAIN DESCRIPTION - PAIN TYPE
TYPE: ACUTE PAIN

## 2024-11-18 ASSESSMENT — COGNITIVE AND FUNCTIONAL STATUS - GENERAL
HELP NEEDED FOR BATHING: A LOT
PERSONAL GROOMING: A LOT
DRESSING REGULAR LOWER BODY CLOTHING: A LOT
DAILY ACTIVITIY SCORE: 12
TOILETING: A LOT
SUGGESTED CMS G CODE MODIFIER DAILY ACTIVITY: CL
EATING MEALS: A LOT
DRESSING REGULAR UPPER BODY CLOTHING: A LOT

## 2024-11-18 NOTE — CARE PLAN
The patient is Stable - Low risk of patient condition declining or worsening    Shift Goals  Clinical Goals: Safety  Patient Goals: Remain updated on POC    Progress made toward(s) clinical / shift goals:     Problem: Pain - Standard  Goal: Alleviation of pain or a reduction in pain to the patient’s comfort goal  Outcome: Progressing     Problem: Knowledge Deficit - Standard  Goal: Patient and family/care givers will demonstrate understanding of plan of care, disease process/condition, diagnostic tests and medications  Outcome: Progressing     Problem: Fall Risk  Goal: Patient will remain free from falls  Outcome: Progressing     Problem: Skin Integrity  Goal: Skin integrity is maintained or improved  Outcome: Progressing

## 2024-11-18 NOTE — PROGRESS NOTES
"Hospital Medicine Daily Progress Note    Date of Service  11/14/2024    Chief Complaint  Bobbi Rousseau is a 84 y.o. female admitted 11/12/2024 with abdominal pain    Hospital Course  This is an 84-year-old female with past medical history of hypertension, dyslipidemia, type 2 diabetes, hypothyroidism, chronic hypoxemic respiratory failure on 2 to 3 L nocturnal, constipation and history of urinary retention who was admitted on 11/12/2024 with worsening back pain.    She was recently admitted at [facility] from 10/24 - 10/29 following a ground-level fall.  CT scan at the time showed an L1 burst fracture, she was discharged to rehab.    CT abdomen/pelvis with contrast was done which showed \"L1 burst fracture with 4 mm retropulsion... Diffuse gallbladder wall thickening with a dilated gallbladder measuring 5.4 cm in diameter and 0.1 cm in length.  Prominent adjacent inflammatory changes noted.  Findings are favored to relate to cholecystitis.  No evidence of gallbladder perforation or abscess.... Urinary bladder distention. \"  RUQ US -noted cholelithiasis, gallbladder sludge, thickened gallbladder, sonographic signs compatible with cholecystitis.  Patient started on Rocephin and Flagyl.  Patient underwent laparoscopic cholecystectomy on 11/15 by Dr. Tena    Urinalysis significant for UTI, patient with significan urinary retention requiring Navarrete catheter placement. Treated with Rocephin.    Lumbar MRI noted moderate canal narrowing at L4-L5 with moderate left lateral recess stenosis.  Superior endplate compression fracture at L1 with extensive bone marrow edema and a fluid filled vertebral body collapse subjacent to the superior endplate.  Approximately about 30% loss of height with minimal posterior superior cortical retropulsion without cauda equina compression. Plan to manage non operatively with pain control and PT OT.    On 11/13 AM, patient noted to have new onset atrial fibrillation with RVR.  " Started on metoprolol, echocardiogram reveals LVEF 65% and no significant valvular abnormality. Patient will be started on Eliquis.        Interval Problem Update  Patient reports some improvement of her neck pain worse after her movement is still limited.  Added lidocaine and ibuprofen for multimodal pain control.  She denies any abdominal pain, nausea or vomiting.  Advance diet as tolerated.  PT OT.  Plan is for patient to discharge to SNF for ongoing PT OT.    I reviewed the chart along with vitals, labs, imaging, test (both pending and resulted) and recommendations from specialists and interdisciplinary team.  I have discussed this patient's plan of care and discharge plan at IDT rounds today with Case Management, Nursing, Nursing leadership, and other members of the IDT team.    Consultants/Specialty  general surgery    Code Status  DNAR/DNI    Disposition  Patient is medically cleared to return back to SNF.    I have placed the appropriate orders for post-discharge needs.    Review of Systems  Review of Systems   All other systems reviewed and are negative.       Physical Exam  Temp:  [36 °C (96.8 °F)-36.4 °C (97.5 °F)] 36.4 °C (97.5 °F)  Pulse:  [69-82] 75  Resp:  [16-17] 17  BP: (115-131)/(60-80) 128/80  SpO2:  [90 %-96 %] 93 %    Physical Exam  Vitals and nursing note reviewed.   Constitutional:       General: She is not in acute distress.  HENT:      Head: Normocephalic and atraumatic.      Mouth/Throat:      Mouth: Mucous membranes are moist.      Pharynx: No oropharyngeal exudate.   Eyes:      Extraocular Movements: Extraocular movements intact.      Pupils: Pupils are equal, round, and reactive to light.   Cardiovascular:      Rate and Rhythm: Normal rate and regular rhythm.      Pulses: Normal pulses.      Heart sounds: No murmur heard.     No friction rub. No gallop.   Pulmonary:      Effort: Pulmonary effort is normal. No respiratory distress.      Breath sounds: No wheezing, rhonchi or rales.    Abdominal:      General: Bowel sounds are normal. There is no distension.      Palpations: Abdomen is soft. There is no mass.      Tenderness: There is abdominal tenderness (RUQ tenderness).   Musculoskeletal:         General: No swelling or tenderness. Normal range of motion.      Cervical back: Normal range of motion. No rigidity. No muscular tenderness.      Right lower leg: No edema.      Left lower leg: No edema.   Skin:     General: Skin is warm and dry.      Capillary Refill: Capillary refill takes less than 2 seconds.      Findings: No erythema or rash.   Neurological:      General: No focal deficit present.      Mental Status: She is alert and oriented to person, place, and time.      Motor: No weakness.      Gait: Gait normal.         Fluids    Intake/Output Summary (Last 24 hours) at 11/17/2024 1613  Last data filed at 11/17/2024 1500  Gross per 24 hour   Intake 720 ml   Output 2450 ml   Net -1730 ml        Laboratory  Recent Labs     11/15/24  1233 11/16/24  0126 11/17/24  0101   WBC 8.8 10.6 8.1   RBC 4.61 4.46 4.40   HEMOGLOBIN 13.4 13.2 12.8   HEMATOCRIT 41.1 40.4 40.1   MCV 89.2 90.6 91.1   MCH 29.1 29.6 29.1   MCHC 32.6 32.7 31.9*   RDW 44.3 45.1 45.2   PLATELETCT 398 394 453*   MPV 9.5 9.4 9.1     Recent Labs     11/15/24  1233 11/16/24  0126 11/17/24  0101   SODIUM 130* 132* 130*   POTASSIUM 3.5* 4.0 3.2*   CHLORIDE 93* 94* 94*   CO2 26 26 27   GLUCOSE 183* 203* 277*   BUN 9 13 11   CREATININE 0.66 0.69 0.80   CALCIUM 8.3* 8.1* 8.6                   Imaging  EC-ECHOCARDIOGRAM COMPLETE W/O CONT   Final Result      DX-CHEST-PORTABLE (1 VIEW)   Final Result      LEFT basilar opacity suspicious for pneumonia. Pleural effusion could be present.      MR-LUMBAR SPINE-W/O   Final Result         Moderate canal narrowing at L4-5 with moderate left lateral recess stenosis.      Superior endplate compression fracture at L1 with extensive bone marrow edema and a fluid-filled vertebral body cleft subadjacent  to the superior endplate. There is approximately 30% loss of height with minimal posterior superior cortical retropulsion    but without cauda equina compression.      Mild edema along the superior endplate of L4 may represent a noncompressive bone contusion.         US-RUQ   Final Result         1.  Cholelithiasis and gallbladder sludge with thickened gallbladder wall, sonographically compatible with cholecystitis.   2.  Hepatomegaly   3.  Echogenic liver, compatible with fatty change versus fibrosis.   4.  Echogenic right kidney, nonspecific but can be associated with medical renal disease.      CT-OUTSIDE IMAGES-CT ABDOMEN/PELVIS   Final Result      CT-OUTSIDE IMAGES-CT LUMBAR SPINE   Final Result      CT-OUTSIDE IMAGES-CT CHEST/ABDOMEN/PELVIS   Final Result           Assessment/Plan  ** Closed stable burst fracture of first lumbar vertebra (HCC)- (present on admission)  Assessment & Plan  Patient fell and admitted at [facility] from 10/24 - 10/29 following CT scan showing L1 burst fracture  She was discharged to rehab and has been having persistent constipation and also found to have urinary retention  Lumbar MRI noted moderate canal narrowing at L4-L5 with moderate left lateral recess stenosis.  Superior endplate compression fracture at L1 with extensive bone marrow edema and a fluid filled vertebral body collapse subjacent to the superior endplate.  Approximately about 30% loss of height with minimal posterior superior cortical retropulsion without cauda equina compression.  Continue narcotic pain management  PT, OT    Hypophosphatemia  Assessment & Plan  Oral Supplementation  Serial BMP, magnesium, phosphorus levels ordered for tomorrow morning to continue to monitor electrolytes       Atrial fibrillation with RVR (HCC)  Assessment & Plan  New onset 11/13 a.m.  Started on metoprolol  Echocardiogram pending  [id number] 5  Vitals:    11/15/24 1623   BP: 130/61   Pulse: 80   Resp: 20   Temp: 36.5 °C (97.7 °F)    SpO2: 97%         Generalized abdominal pain  Assessment & Plan  Concern for possible cholecystitis on outside imaging CT and right upper quadrant ultrasound  Covered empirically with antibiotic therapy  General surgery consulted     Hyponatremia- (present on admission)  Assessment & Plan  119 at outside facility  Unclear etiology, she does appear euvolemic.  Slow rate IV fluids  Urine studies ordered  BMP ordered every 8 to continue monitoring and avoid overcorrection    Recent Labs     11/13/24  1543 11/14/24  0137 11/15/24  1233   SODIUM 128* 129* 130*   POTASSIUM 3.6 3.4* 3.5*   CHLORIDE 91* 93* 93*   CO2 25 25 26   GLUCOSE 208* 169* 183*   BUN 12 11 9   CREATININE 0.65 0.59 0.66     Not sure re: rate of rise of sodium levels at the OSH, stable here.    Constipation- (present on admission)  Assessment & Plan  She has been on narcotics for recent L1 fracture.  Bowel protocol    Dyslipidemia- (present on admission)  Assessment & Plan  Continue statin    Primary hypertension- (present on admission)  Assessment & Plan  Continue losartan  Vitals:    11/15/24 1623   BP: 130/61   Pulse: 80   Resp: 20   Temp: 36.5 °C (97.7 °F)   SpO2: 97%     Stable    Type 2 diabetes mellitus with hyperglycemia, with long-term current use of insulin (HCC)- (present on admission)  Assessment & Plan  Continue glargine, sliding scale added while in the hospital.  Hold home orals  A1c 8.1 slioghtly worse than prior  On insulin    Acquired hypothyroidism- (present on admission)  Assessment & Plan  Continue levothyroxine    Acute cystitis without hematuria- (present on admission)  Assessment & Plan  UA outside facility showed moderate bacteria started on ceftriaxone and metronidazole prior to transfer  Day team to follow-up with UA cultures from outside facility.  Repeat UA here ordered  Treated with IV abx    Urinary retention- (present on admission)  Assessment & Plan  Navarrete catheter placed prior to transfer from [facility] with 2 L of urine  immediately drained  She has been on narcotics for recent fracture  Due to urinary retention as well as constipation there is concern for cauda equina syndrome or other neurologic abnormality following recent L1 burst fracture - RULED OUT          VTE prophylaxis: Lovenox    I have performed a physical exam and reviewed and updated ROS and Plan today (11/17/2024). In review of yesterday's note (11/16/2024), there are no changes except as documented above.

## 2024-11-18 NOTE — THERAPY
"Occupational Therapy  Daily Treatment     Patient Name: Bobbi Rousseau  Age:  84 y.o., Sex:  female  Medical Record #: [id number]  Today's Date: 11/18/2024     Precautions  Precautions: (P) Fall Risk, Spinal / Back Precautions   Comments: (P) L1 burst fracture    Assessment    Pt seen for follow up OT tx session, pt making slow progress with functional mobility and ADLs requiring more assistance than previous session and initially did not want to participate electing to lay in supine completely flat. Will continue to see for skilled therapy while admitted as well as recommend post-acute placement.    Plan    Treatment Plan Status: (P) Continue Current Treatment Plan  Type of Treatment: Self Care / Activities of Daily Living, Adaptive Equipment, Therapeutic Exercises, Neuro Re-Education / Balance, Therapeutic Activity  Treatment Frequency: 4 Times per Week  Treatment Duration: Until Therapy Goals Met    DC Equipment Recommendations: (P) Unable to determine at this time  Discharge Recommendations: (P) Recommend post-acute placement for additional occupational therapy services prior to discharge home    Subjective    \"I'd rather not\"     Objective       11/18/24 1442   Precautions   Precautions Fall Risk;Spinal / Back Precautions    Comments L1 burst fracture   Vitals   O2 (LPM) 4   O2 Delivery Device Silicone Nasal Cannula   Pain 0 - 10 Group   Therapist Pain Assessment Post Activity Pain Same as Prior to Activity;Nurse Notified   Cognition    Cognition / Consciousness WDL   Level of Consciousness Alert   Strength Upper Body   Upper Body Strength  X   Gross Strength Generalized Weakness, Equal Bilaterally.    Balance   Sitting Balance (Static) Fair   Sitting Balance (Dynamic) Fair -   Standing Balance (Static) Fair -   Standing Balance (Dynamic) Poor +   Weight Shift Sitting Poor   Weight Shift Standing Poor   Skilled Intervention Verbal Cuing;Facilitation   Comments w/ FWW   Bed Mobility    Supine to Sit " Supervised   Sit to Supine Contact Guard Assist   Rolling Supervised   Skilled Intervention Verbal Cuing;Facilitation   Comments log rolled without assistance   Activities of Daily Living   Upper Body Dressing Supervision   Lower Body Dressing Maximal Assist   Skilled Intervention Verbal Cuing;Facilitation   How much help from another person does the patient currently need...   Putting on and taking off regular lower body clothing? 2   Bathing (including washing, rinsing, and drying)? 2   Toileting, which includes using a toilet, bedpan, or urinal? 2   Putting on and taking off regular upper body clothing? 2   Taking care of personal grooming such as brushing teeth? 2   Eating meals? 2   6 Clicks Daily Activity Score 12   Functional Mobility   Sit to Stand Maximal Assist   Bed, Chair, Wheelchair Transfer Unable to Participate   Transfer Method Stand Step   Mobility bed mobility, STS, side steps, returned to supine   Skilled Intervention Verbal Cuing   Comments w/ FWW   Activity Tolerance   Sitting in Chair refused   Sitting Edge of Bed 8 min   Standing 3 min   Patient / Family Goals   Patient / Family Goal #1 to go home   Goal #1 Outcome Progressing slower than expected   Short Term Goals   Short Term Goal # 1 Pt will perform LB dressing w/ supv and AE PRN   Goal Outcome # 1 Progressing slower than expected   Short Term Goal # 2 Pt will perform ADL transfer w/ supv   Goal Outcome # 2 Progressing slower than expected   Short Term Goal # 3 Pt will perform toilet hygiene w/ supv   Goal Outcome # 3 Progressing slower than expected   Short Term Goal # 4 Pt pravin perform standing g/h w/ supv   Goal Outcome # 4 Progressing slower than expected   Education Group   Education Provided Role of Occupational Therapist   Role of Occupational Therapist Patient Response Patient;Acceptance;Explanation;Verbal Demonstration   Occupational Therapy Treatment Plan    O.T. Treatment Plan Continue Current Treatment Plan   Anticipated  Discharge Equipment and Recommendations   DC Equipment Recommendations Unable to determine at this time   Discharge Recommendations Recommend post-acute placement for additional occupational therapy services prior to discharge home   Interdisciplinary Plan of Care Collaboration   IDT Collaboration with  Nursing   Patient Position at End of Therapy In Bed;Bed Alarm On;Call Light within Reach;Tray Table within Reach;Phone within Reach   Collaboration Comments RN updated

## 2024-11-18 NOTE — CARE PLAN
The patient is Stable - Low risk of patient condition declining or worsening    Shift Goals  Clinical Goals: Pain control at comfort level less than 5/10 within 12 hour shift, allowing improved mobility  Patient Goals: Pain Control, Rest, Comfort  Family Goals: VICTORIA    Progress made toward(s) clinical / shift goals: Medicated per MAR for complaints of pain with some relief verbalized. Improved compliance with turning and repositioning. Maintained on O2 at 4 lpm via NC. Able to make needs known, call light placed within easy reach. Retaining urine, on bladder scan protocol, straight catheterization done once this shift.     Patient is not progressing towards the following goals:       Problem: Pain - Standard  Goal: Alleviation of pain or a reduction in pain to the patient’s comfort goal  Description: Target End Date:  Prior to discharge or change in level of care    Document on Vitals flowsheet    1.  Document pain using the appropriate pain scale per order or unit policy  2.  Educate and implement non-pharmacologic comfort measures (i.e. relaxation, distraction, massage, cold/heat therapy, etc.)  3.  Pain management medications as ordered  4.  Reassess pain after pain med administration per policy  5.  If opiods administered assess patient's response to pain medication is appropriate per POSS sedation scale  6.  Follow pain management plan developed in collaboration with patient and interdisciplinary team (including palliative care or pain specialists if applicable)  Outcome: Not Progressing     Problem: Skin Integrity  Goal: Skin integrity is maintained or improved  Description: Target End Date:  Prior to discharge or change in level of care    Document interventions on Skin Risk/Jose flowsheet groups and corresponding LDA    1.  Assess and monitor skin integrity, appearance and/or temperature  2.  Assess risk factors for impaired skin integrity and/or pressures ulcers  3.  Implement precautions to protect skin  integrity in collaboration with interdisciplinary team  4.  Implement pressure ulcer prevention protocol if at risk for skin breakdown  5.  Confirm wound care consult if at risk for skin breakdown  6.  Ensure patient use of pressure relieving devices  (Low air loss bed, waffle overlay, heel protectors, ROHO cushion, etc)  Outcome: Not Progressing     Problem: Respiratory:  Goal: Respiratory status will improve  Outcome: Not Progressing     Problem: Urinary Elimination:  Goal: Ability to reestablish a normal urinary elimination pattern will improve  Outcome: Not Progressing

## 2024-11-18 NOTE — PROGRESS NOTES
On voiding trial post FC removal. No urine output noted, bladder distention observed. Denied urge to urinate. Bladder scan done, results of 492 mL. Straight catheterization done, drained 450 mL of urine. Tolerated procedure.

## 2024-11-18 NOTE — PROGRESS NOTES
Received care of pt from NOC RN this am. Pt is A+O x 4, medicated for pain control. On a Q 2 turn and repositioning schedule.

## 2024-11-18 NOTE — CARE PLAN
The patient is Watcher - Medium risk of patient condition declining or worsening    Shift Goals  Clinical Goals: pain control, mobilize  Patient Goals: pain control  Family Goals: VICTORIA    Progress made toward(s) clinical / shift goals:  receives medication for pain control. Declines to mobilize    Patient is not progressing towards the following goals:

## 2024-11-19 LAB
ANION GAP SERPL CALC-SCNC: 9 MMOL/L (ref 7–16)
BUN SERPL-MCNC: 9 MG/DL (ref 8–22)
CALCIUM SERPL-MCNC: 8.9 MG/DL (ref 8.5–10.5)
CHLORIDE SERPL-SCNC: 97 MMOL/L (ref 96–112)
CO2 SERPL-SCNC: 26 MMOL/L (ref 20–33)
CREAT SERPL-MCNC: 0.63 MG/DL (ref 0.5–1.4)
ERYTHROCYTE [DISTWIDTH] IN BLOOD BY AUTOMATED COUNT: 44 FL (ref 35.9–50)
GFR SERPLBLD CREATININE-BSD FMLA CKD-EPI: 88 ML/MIN/1.73 M 2
GLUCOSE BLD STRIP.AUTO-MCNC: 174 MG/DL (ref 65–99)
GLUCOSE BLD STRIP.AUTO-MCNC: 179 MG/DL (ref 65–99)
GLUCOSE BLD STRIP.AUTO-MCNC: 198 MG/DL (ref 65–99)
GLUCOSE BLD STRIP.AUTO-MCNC: 212 MG/DL (ref 65–99)
GLUCOSE SERPL-MCNC: 181 MG/DL (ref 65–99)
HCT VFR BLD AUTO: 42.1 % (ref 37–47)
HGB BLD-MCNC: 14 G/DL (ref 12–16)
MCH RBC QN AUTO: 29.9 PG (ref 27–33)
MCHC RBC AUTO-ENTMCNC: 33.3 G/DL (ref 32.2–35.5)
MCV RBC AUTO: 90 FL (ref 81.4–97.8)
PLATELET # BLD AUTO: 399 K/UL (ref 164–446)
PMV BLD AUTO: 9.3 FL (ref 9–12.9)
POTASSIUM SERPL-SCNC: 4.2 MMOL/L (ref 3.6–5.5)
RBC # BLD AUTO: 4.68 M/UL (ref 4.2–5.4)
SODIUM SERPL-SCNC: 132 MMOL/L (ref 135–145)
WBC # BLD AUTO: 7.4 K/UL (ref 4.8–10.8)

## 2024-11-19 PROCEDURE — 700101 HCHG RX REV CODE 250: Performed by: INTERNAL MEDICINE

## 2024-11-19 PROCEDURE — 700102 HCHG RX REV CODE 250 W/ 637 OVERRIDE(OP): Performed by: INTERNAL MEDICINE

## 2024-11-19 PROCEDURE — 51798 US URINE CAPACITY MEASURE: CPT

## 2024-11-19 PROCEDURE — A9270 NON-COVERED ITEM OR SERVICE: HCPCS | Performed by: STUDENT IN AN ORGANIZED HEALTH CARE EDUCATION/TRAINING PROGRAM

## 2024-11-19 PROCEDURE — 700102 HCHG RX REV CODE 250 W/ 637 OVERRIDE(OP): Performed by: STUDENT IN AN ORGANIZED HEALTH CARE EDUCATION/TRAINING PROGRAM

## 2024-11-19 PROCEDURE — A9270 NON-COVERED ITEM OR SERVICE: HCPCS | Performed by: GENERAL PRACTICE

## 2024-11-19 PROCEDURE — A9270 NON-COVERED ITEM OR SERVICE: HCPCS | Performed by: INTERNAL MEDICINE

## 2024-11-19 PROCEDURE — 770006 HCHG ROOM/CARE - MED/SURG/GYN SEMI*

## 2024-11-19 PROCEDURE — 36415 COLL VENOUS BLD VENIPUNCTURE: CPT

## 2024-11-19 PROCEDURE — 80048 BASIC METABOLIC PNL TOTAL CA: CPT

## 2024-11-19 PROCEDURE — A9270 NON-COVERED ITEM OR SERVICE: HCPCS

## 2024-11-19 PROCEDURE — 82962 GLUCOSE BLOOD TEST: CPT | Mod: 91

## 2024-11-19 PROCEDURE — 85027 COMPLETE CBC AUTOMATED: CPT

## 2024-11-19 PROCEDURE — 700102 HCHG RX REV CODE 250 W/ 637 OVERRIDE(OP): Performed by: GENERAL PRACTICE

## 2024-11-19 PROCEDURE — 700102 HCHG RX REV CODE 250 W/ 637 OVERRIDE(OP)

## 2024-11-19 PROCEDURE — 99232 SBSQ HOSP IP/OBS MODERATE 35: CPT | Performed by: STUDENT IN AN ORGANIZED HEALTH CARE EDUCATION/TRAINING PROGRAM

## 2024-11-19 RX ADMIN — AMLODIPINE BESYLATE 10 MG: 10 TABLET ORAL at 18:10

## 2024-11-19 RX ADMIN — INSULIN LISPRO 4 UNITS: 100 INJECTION, SOLUTION INTRAVENOUS; SUBCUTANEOUS at 11:30

## 2024-11-19 RX ADMIN — SIMVASTATIN 20 MG: 40 TABLET, FILM COATED ORAL at 19:53

## 2024-11-19 RX ADMIN — SENNOSIDES AND DOCUSATE SODIUM 2 TABLET: 50; 8.6 TABLET ORAL at 18:10

## 2024-11-19 RX ADMIN — LOSARTAN POTASSIUM 100 MG: 50 TABLET, FILM COATED ORAL at 06:00

## 2024-11-19 RX ADMIN — LIDOCAINE 1 PATCH: 4 PATCH TOPICAL at 06:02

## 2024-11-19 RX ADMIN — METOPROLOL TARTRATE 50 MG: 50 TABLET, FILM COATED ORAL at 06:01

## 2024-11-19 RX ADMIN — METOPROLOL TARTRATE 50 MG: 50 TABLET, FILM COATED ORAL at 18:10

## 2024-11-19 RX ADMIN — INSULIN LISPRO 3 UNITS: 100 INJECTION, SOLUTION INTRAVENOUS; SUBCUTANEOUS at 08:13

## 2024-11-19 RX ADMIN — MICONAZOLE NITRATE: 2 CREAM TOPICAL at 06:02

## 2024-11-19 RX ADMIN — APIXABAN 5 MG: 5 TABLET, FILM COATED ORAL at 18:10

## 2024-11-19 RX ADMIN — OXYCODONE 5 MG: 5 TABLET ORAL at 08:05

## 2024-11-19 RX ADMIN — INSULIN GLARGINE-YFGN 10 UNITS: 100 INJECTION, SOLUTION SUBCUTANEOUS at 11:30

## 2024-11-19 RX ADMIN — INSULIN LISPRO 3 UNITS: 100 INJECTION, SOLUTION INTRAVENOUS; SUBCUTANEOUS at 19:54

## 2024-11-19 RX ADMIN — SENNOSIDES AND DOCUSATE SODIUM 2 TABLET: 50; 8.6 TABLET ORAL at 06:00

## 2024-11-19 RX ADMIN — OXYCODONE 5 MG: 5 TABLET ORAL at 19:53

## 2024-11-19 RX ADMIN — TAMSULOSIN HYDROCHLORIDE 0.4 MG: 0.4 CAPSULE ORAL at 08:05

## 2024-11-19 RX ADMIN — INSULIN LISPRO 3 UNITS: 100 INJECTION, SOLUTION INTRAVENOUS; SUBCUTANEOUS at 18:10

## 2024-11-19 RX ADMIN — OXYCODONE 2.5 MG: 5 TABLET ORAL at 02:08

## 2024-11-19 ASSESSMENT — CHA2DS2 SCORE
PRIOR STROKE OR TIA OR THROMBOEMBOLISM: NO
SEX: FEMALE
CHA2DS2 VASC SCORE: 5
AGE 75 OR GREATER: YES
CHF OR LEFT VENTRICULAR DYSFUNCTION: NO
VASCULAR DISEASE: NO
HYPERTENSION: YES
AGE 65 TO 74: NO
DIABETES: YES

## 2024-11-19 ASSESSMENT — PAIN DESCRIPTION - PAIN TYPE
TYPE: ACUTE PAIN

## 2024-11-19 NOTE — PROGRESS NOTES
"Hospital Medicine Daily Progress Note    Date of Service  11/14/2024    Chief Complaint  Bobbi Rousseau is a 84 y.o. female admitted 11/12/2024 with abdominal pain    Hospital Course  This is an 84-year-old female with past medical history of hypertension, dyslipidemia, type 2 diabetes, hypothyroidism, chronic hypoxemic respiratory failure on 2 to 3 L nocturnal, constipation and history of urinary retention who was admitted on 11/12/2024 with worsening back pain.    She was recently admitted at [facility] from 10/24 - 10/29 following a ground-level fall.  CT scan at the time showed an L1 burst fracture, she was discharged to rehab.    CT abdomen/pelvis with contrast was done which showed \"L1 burst fracture with 4 mm retropulsion... Diffuse gallbladder wall thickening with a dilated gallbladder measuring 5.4 cm in diameter and 0.1 cm in length.  Prominent adjacent inflammatory changes noted.  Findings are favored to relate to cholecystitis.  No evidence of gallbladder perforation or abscess.... Urinary bladder distention. \"  RUQ US -noted cholelithiasis, gallbladder sludge, thickened gallbladder, sonographic signs compatible with cholecystitis.  Patient started on Rocephin and Flagyl.  Patient underwent laparoscopic cholecystectomy on 11/15 by Dr. Tena    Urinalysis significant for UTI, patient with significan urinary retention requiring Navarrete catheter placement. Treated with Rocephin.    Lumbar MRI noted moderate canal narrowing at L4-L5 with moderate left lateral recess stenosis.  Superior endplate compression fracture at L1 with extensive bone marrow edema and a fluid filled vertebral body collapse subjacent to the superior endplate.  Approximately about 30% loss of height with minimal posterior superior cortical retropulsion without cauda equina compression. Plan to manage non operatively with pain control and PT OT.    On 11/13 AM, patient noted to have new onset atrial fibrillation with RVR.  " Started on metoprolol, echocardiogram reveals LVEF 65% and no significant valvular abnormality. Patient will be started on Eliquis.        Interval Problem Update  No acute events overnight.  Patient reports back pain is controlled, does not hurt if she is laying still.  She denies any abdominal pain. She is not sure if she has been peeing or pooping well.  Patients garcia catheter removed yesterday, urinating well. Occasional bladder scan will be high but able to void adequately.  Last BM 2 days ago per chart review.  Discussed anticoagulation for new diagnosis of a fib and patient is agreeable to starting eliquis BID. Eliquis ordered.  Patient is medically cleared for discharge to SNF.      I have discussed this patient's plan of care and discharge plan at IDT rounds today with Case Management, Nursing, Nursing leadership, and other members of the IDT team.    Consultants/Specialty  general surgery    Code Status  DNAR/DNI    Disposition  Patient is medically cleared to return back to SNF.    I have placed the appropriate orders for post-discharge needs.    Review of Systems  Review of Systems   All other systems reviewed and are negative.       Physical Exam  Temp:  [36.4 °C (97.5 °F)-36.5 °C (97.7 °F)] 36.4 °C (97.6 °F)  Pulse:  [72-92] 92  Resp:  [17-18] 18  BP: (128-148)/(78-95) 148/95  SpO2:  [94 %-99 %] 99 %    Physical Exam  Vitals and nursing note reviewed.   Constitutional:       General: She is not in acute distress.  HENT:      Head: Normocephalic and atraumatic.      Mouth/Throat:      Mouth: Mucous membranes are moist.      Pharynx: No oropharyngeal exudate.   Eyes:      Extraocular Movements: Extraocular movements intact.      Pupils: Pupils are equal, round, and reactive to light.   Cardiovascular:      Rate and Rhythm: Normal rate and regular rhythm.      Pulses: Normal pulses.      Heart sounds: No murmur heard.     No friction rub. No gallop.   Pulmonary:      Effort: Pulmonary effort is normal.  No respiratory distress.      Breath sounds: No wheezing, rhonchi or rales.   Abdominal:      General: Bowel sounds are normal. There is no distension.      Palpations: Abdomen is soft. There is no mass.      Tenderness: There is abdominal tenderness (RUQ tenderness).   Musculoskeletal:         General: No swelling or tenderness. Normal range of motion.      Cervical back: Normal range of motion. No rigidity. No muscular tenderness.      Right lower leg: No edema.      Left lower leg: No edema.   Skin:     General: Skin is warm and dry.      Capillary Refill: Capillary refill takes less than 2 seconds.      Findings: No erythema or rash.   Neurological:      General: No focal deficit present.      Mental Status: She is alert and oriented to person, place, and time.      Motor: No weakness.      Gait: Gait normal.         Fluids    Intake/Output Summary (Last 24 hours) at 11/19/2024 1439  Last data filed at 11/19/2024 0225  Gross per 24 hour   Intake 300 ml   Output 800 ml   Net -500 ml        Laboratory  Recent Labs     11/17/24  0101 11/18/24  0646 11/19/24  0403   WBC 8.1 6.9 7.4   RBC 4.40 4.46 4.68   HEMOGLOBIN 12.8 13.2 14.0   HEMATOCRIT 40.1 40.0 42.1   MCV 91.1 89.7 90.0   MCH 29.1 29.6 29.9   MCHC 31.9* 33.0 33.3   RDW 45.2 44.6 44.0   PLATELETCT 453* 403 399   MPV 9.1 8.9* 9.3     Recent Labs     11/17/24  0101 11/18/24  0646 11/19/24  0403   SODIUM 130* 134* 132*   POTASSIUM 3.2* 4.0 4.2   CHLORIDE 94* 99 97   CO2 27 26 26   GLUCOSE 277* 214* 181*   BUN 11 9 9   CREATININE 0.80 0.56 0.63   CALCIUM 8.6 8.6 8.9                   Imaging  EC-ECHOCARDIOGRAM COMPLETE W/O CONT   Final Result      DX-CHEST-PORTABLE (1 VIEW)   Final Result      LEFT basilar opacity suspicious for pneumonia. Pleural effusion could be present.      MR-LUMBAR SPINE-W/O   Final Result         Moderate canal narrowing at L4-5 with moderate left lateral recess stenosis.      Superior endplate compression fracture at L1 with extensive  bone marrow edema and a fluid-filled vertebral body cleft subadjacent to the superior endplate. There is approximately 30% loss of height with minimal posterior superior cortical retropulsion    but without cauda equina compression.      Mild edema along the superior endplate of L4 may represent a noncompressive bone contusion.         US-RUQ   Final Result         1.  Cholelithiasis and gallbladder sludge with thickened gallbladder wall, sonographically compatible with cholecystitis.   2.  Hepatomegaly   3.  Echogenic liver, compatible with fatty change versus fibrosis.   4.  Echogenic right kidney, nonspecific but can be associated with medical renal disease.      CT-OUTSIDE IMAGES-CT ABDOMEN/PELVIS   Final Result      CT-OUTSIDE IMAGES-CT LUMBAR SPINE   Final Result      CT-OUTSIDE IMAGES-CT CHEST/ABDOMEN/PELVIS   Final Result           Assessment/Plan  ** Closed stable burst fracture of first lumbar vertebra (HCC)- (present on admission)  Assessment & Plan  Patient fell and admitted at [facility] from 10/24 - 10/29 following CT scan showing L1 burst fracture  She was discharged to rehab and has been having persistent constipation and also found to have urinary retention  Lumbar MRI noted moderate canal narrowing at L4-L5 with moderate left lateral recess stenosis.  Superior endplate compression fracture at L1 with extensive bone marrow edema and a fluid filled vertebral body collapse subjacent to the superior endplate.  Approximately about 30% loss of height with minimal posterior superior cortical retropulsion without cauda equina compression.  Continue narcotic pain management  PT, OT    Hypophosphatemia  Assessment & Plan  Oral Supplementation  Serial BMP, magnesium, phosphorus levels ordered for tomorrow morning to continue to monitor electrolytes       Atrial fibrillation with RVR (HCC)  Assessment & Plan  New onset 11/13 a.m.  Started on metoprolol  Echocardiogram pending  [id number] 5  Eliquis started for a  fib stroke risk    Generalized abdominal pain  Assessment & Plan  Concern for possible cholecystitis on outside imaging CT and right upper quadrant ultrasound  Covered empirically with antibiotic therapy  General surgery consulted, s/p sheba dasilvae 11/15; uncomplicated post operative course    Hyponatremia- (present on admission)  Assessment & Plan  119 at outside facility  Unclear etiology, she does appear euvolemic.  Slow rate IV fluids  Urine studies ordered  BMP ordered every 8 to continue monitoring and avoid overcorrection    Recent Labs     11/13/24  1543 11/14/24  0137 11/15/24  1233   SODIUM 128* 129* 130*   POTASSIUM 3.6 3.4* 3.5*   CHLORIDE 91* 93* 93*   CO2 25 25 26   GLUCOSE 208* 169* 183*   BUN 12 11 9   CREATININE 0.65 0.59 0.66     Not sure re: rate of rise of sodium levels at the OSH, stable here.    Constipation- (present on admission)  Assessment & Plan  She has been on narcotics for recent L1 fracture.  Bowel protocol    Dyslipidemia- (present on admission)  Assessment & Plan  Continue statin    Primary hypertension- (present on admission)  Assessment & Plan  Continue losartan  Vitals:    11/15/24 1623   BP: 130/61   Pulse: 80   Resp: 20   Temp: 36.5 °C (97.7 °F)   SpO2: 97%     Stable    Type 2 diabetes mellitus with hyperglycemia, with long-term current use of insulin ([facility])- (present on admission)  Assessment & Plan  Continue glargine, sliding scale added while in the hospital.  Hold home orals  A1c 8.1 slioghtly worse than prior  On insulin    Acquired hypothyroidism- (present on admission)  Assessment & Plan  Continue levothyroxine    Acute cystitis without hematuria- (present on admission)  Assessment & Plan  UA outside facility showed moderate bacteria started on ceftriaxone and metronidazole prior to transfer  Day team to follow-up with UA cultures from outside facility.  Repeat UA here ordered  Treated with IV abx    Urinary retention- (present on admission)  Assessment & Plan  Navarrete  catheter placed prior to transfer from [place] with 2 L of urine immediately drained  She has been on narcotics for recent fracture  Due to urinary retention as well as constipation there is concern for cauda equina syndrome or other neurologic abnormality following recent L1 burst fracture - RULED OUT          VTE prophylaxis: Lovenox    I have performed a physical exam and reviewed and updated ROS and Plan today (11/19/2024). In review of yesterday's note (11/18/2024), there are no changes except as documented above.

## 2024-11-19 NOTE — DISCHARGE PLANNING
@1146  Agency/Facility Name: [facility]  Spoke To: Glenna  Outcome: Bed available tomorrow.  Requested transport is 1400.

## 2024-11-19 NOTE — PROGRESS NOTES
"Hospital Medicine Daily Progress Note    Date of Service  11/14/2024    Chief Complaint  Bobbi Rousseau is a 84 y.o. female admitted 11/12/2024 with abdominal pain    Hospital Course  This is an 84-year-old female with past medical history of hypertension, dyslipidemia, type 2 diabetes, hypothyroidism, chronic hypoxemic respiratory failure on 2 to 3 L nocturnal, constipation and history of urinary retention who was admitted on 11/12/2024 with worsening back pain.    She was recently admitted at [facility] from 10/24 - 10/29 following a ground-level fall.  CT scan at the time showed an L1 burst fracture, she was discharged to rehab.    CT abdomen/pelvis with contrast was done which showed \"L1 burst fracture with 4 mm retropulsion... Diffuse gallbladder wall thickening with a dilated gallbladder measuring 5.4 cm in diameter and 0.1 cm in length.  Prominent adjacent inflammatory changes noted.  Findings are favored to relate to cholecystitis.  No evidence of gallbladder perforation or abscess.... Urinary bladder distention. \"  RUQ US -noted cholelithiasis, gallbladder sludge, thickened gallbladder, sonographic signs compatible with cholecystitis.  Patient started on Rocephin and Flagyl.  Patient underwent laparoscopic cholecystectomy on 11/15 by Dr. Tena    Urinalysis significant for UTI, patient with significan urinary retention requiring Navarrete catheter placement. Treated with Rocephin.    Lumbar MRI noted moderate canal narrowing at L4-L5 with moderate left lateral recess stenosis.  Superior endplate compression fracture at L1 with extensive bone marrow edema and a fluid filled vertebral body collapse subjacent to the superior endplate.  Approximately about 30% loss of height with minimal posterior superior cortical retropulsion without cauda equina compression. Plan to manage non operatively with pain control and PT OT.    On 11/13 AM, patient noted to have new onset atrial fibrillation with RVR.  " Started on metoprolol, echocardiogram reveals LVEF 65% and no significant valvular abnormality. Patient will be started on Eliquis.        Interval Problem Update  Patient reports some improvement of her back pain.  Discontinue Navarrete catheter and do voiding trials.  Patient has been passing bowel movements without difficulty.  Encouraged ambulation.  PT OT.  Plan is for patient to discharge to SNF for ongoing PT OT      I reviewed the chart along with vitals, labs, imaging, test (both pending and resulted) and recommendations from specialists and interdisciplinary team.  I have discussed this patient's plan of care and discharge plan at IDT rounds today with Case Management, Nursing, Nursing leadership, and other members of the IDT team.    Consultants/Specialty  general surgery    Code Status  DNAR/DNI    Disposition  Patient is medically cleared to return back to SNF.    I have placed the appropriate orders for post-discharge needs.    Review of Systems  Review of Systems   All other systems reviewed and are negative.       Physical Exam  Temp:  [36.4 °C (97.6 °F)-36.8 °C (98.2 °F)] 36.4 °C (97.6 °F)  Pulse:  [67-85] 85  Resp:  [17-19] 17  BP: (107-148)/(74-88) 148/84  SpO2:  [94 %-97 %] 97 %    Physical Exam  Vitals and nursing note reviewed.   Constitutional:       General: She is not in acute distress.  HENT:      Head: Normocephalic and atraumatic.      Mouth/Throat:      Mouth: Mucous membranes are moist.      Pharynx: No oropharyngeal exudate.   Eyes:      Extraocular Movements: Extraocular movements intact.      Pupils: Pupils are equal, round, and reactive to light.   Cardiovascular:      Rate and Rhythm: Normal rate and regular rhythm.      Pulses: Normal pulses.      Heart sounds: No murmur heard.     No friction rub. No gallop.   Pulmonary:      Effort: Pulmonary effort is normal. No respiratory distress.      Breath sounds: No wheezing, rhonchi or rales.   Abdominal:      General: Bowel sounds are  normal. There is no distension.      Palpations: Abdomen is soft. There is no mass.      Tenderness: There is abdominal tenderness (RUQ tenderness).   Musculoskeletal:         General: No swelling or tenderness. Normal range of motion.      Cervical back: Normal range of motion. No rigidity. No muscular tenderness.      Right lower leg: No edema.      Left lower leg: No edema.   Skin:     General: Skin is warm and dry.      Capillary Refill: Capillary refill takes less than 2 seconds.      Findings: No erythema or rash.   Neurological:      General: No focal deficit present.      Mental Status: She is alert and oriented to person, place, and time.      Motor: No weakness.      Gait: Gait normal.         Fluids    Intake/Output Summary (Last 24 hours) at 11/18/2024 1718  Last data filed at 11/18/2024 0941  Gross per 24 hour   Intake 360 ml   Output 850 ml   Net -490 ml        Laboratory  Recent Labs     11/16/24  0126 11/17/24  0101 11/18/24  0646   WBC 10.6 8.1 6.9   RBC 4.46 4.40 4.46   HEMOGLOBIN 13.2 12.8 13.2   HEMATOCRIT 40.4 40.1 40.0   MCV 90.6 91.1 89.7   MCH 29.6 29.1 29.6   MCHC 32.7 31.9* 33.0   RDW 45.1 45.2 44.6   PLATELETCT 394 453* 403   MPV 9.4 9.1 8.9*     Recent Labs     11/16/24  0126 11/17/24  0101 11/18/24  0646   SODIUM 132* 130* 134*   POTASSIUM 4.0 3.2* 4.0   CHLORIDE 94* 94* 99   CO2 26 27 26   GLUCOSE 203* 277* 214*   BUN 13 11 9   CREATININE 0.69 0.80 0.56   CALCIUM 8.1* 8.6 8.6                   Imaging  EC-ECHOCARDIOGRAM COMPLETE W/O CONT   Final Result      DX-CHEST-PORTABLE (1 VIEW)   Final Result      LEFT basilar opacity suspicious for pneumonia. Pleural effusion could be present.      MR-LUMBAR SPINE-W/O   Final Result         Moderate canal narrowing at L4-5 with moderate left lateral recess stenosis.      Superior endplate compression fracture at L1 with extensive bone marrow edema and a fluid-filled vertebral body cleft subadjacent to the superior endplate. There is approximately  30% loss of height with minimal posterior superior cortical retropulsion    but without cauda equina compression.      Mild edema along the superior endplate of L4 may represent a noncompressive bone contusion.         US-RUQ   Final Result         1.  Cholelithiasis and gallbladder sludge with thickened gallbladder wall, sonographically compatible with cholecystitis.   2.  Hepatomegaly   3.  Echogenic liver, compatible with fatty change versus fibrosis.   4.  Echogenic right kidney, nonspecific but can be associated with medical renal disease.      CT-OUTSIDE IMAGES-CT ABDOMEN/PELVIS   Final Result      CT-OUTSIDE IMAGES-CT LUMBAR SPINE   Final Result      CT-OUTSIDE IMAGES-CT CHEST/ABDOMEN/PELVIS   Final Result           Assessment/Plan  ** Closed stable burst fracture of first lumbar vertebra (HCC)- (present on admission)  Assessment & Plan  Patient fell and admitted at [facility] from 10/24 - 10/29 following CT scan showing L1 burst fracture  She was discharged to rehab and has been having persistent constipation and also found to have urinary retention  Lumbar MRI noted moderate canal narrowing at L4-L5 with moderate left lateral recess stenosis.  Superior endplate compression fracture at L1 with extensive bone marrow edema and a fluid filled vertebral body collapse subjacent to the superior endplate.  Approximately about 30% loss of height with minimal posterior superior cortical retropulsion without cauda equina compression.  Continue narcotic pain management  PT, OT    Hypophosphatemia  Assessment & Plan  Oral Supplementation  Serial BMP, magnesium, phosphorus levels ordered for tomorrow morning to continue to monitor electrolytes       Atrial fibrillation with RVR (HCC)  Assessment & Plan  New onset 11/13 a.m.  Started on metoprolol  Echocardiogram pending  [id number] 5  Vitals:    11/15/24 1623   BP: 130/61   Pulse: 80   Resp: 20   Temp: 36.5 °C (97.7 °F)   SpO2: 97%         Generalized abdominal  pain  Assessment & Plan  Concern for possible cholecystitis on outside imaging CT and right upper quadrant ultrasound  Covered empirically with antibiotic therapy  General surgery consulted     Hyponatremia- (present on admission)  Assessment & Plan  119 at outside facility  Unclear etiology, she does appear euvolemic.  Slow rate IV fluids  Urine studies ordered  BMP ordered every 8 to continue monitoring and avoid overcorrection    Recent Labs     11/13/24  1543 11/14/24  0137 11/15/24  1233   SODIUM 128* 129* 130*   POTASSIUM 3.6 3.4* 3.5*   CHLORIDE 91* 93* 93*   CO2 25 25 26   GLUCOSE 208* 169* 183*   BUN 12 11 9   CREATININE 0.65 0.59 0.66     Not sure re: rate of rise of sodium levels at the OSH, stable here.    Constipation- (present on admission)  Assessment & Plan  She has been on narcotics for recent L1 fracture.  Bowel protocol    Dyslipidemia- (present on admission)  Assessment & Plan  Continue statin    Primary hypertension- (present on admission)  Assessment & Plan  Continue losartan  Vitals:    11/15/24 1623   BP: 130/61   Pulse: 80   Resp: 20   Temp: 36.5 °C (97.7 °F)   SpO2: 97%     Stable    Type 2 diabetes mellitus with hyperglycemia, with long-term current use of insulin (Former[facility])- (present on admission)  Assessment & Plan  Continue glargine, sliding scale added while in the hospital.  Hold home orals  A1c 8.1 slioghtly worse than prior  On insulin    Acquired hypothyroidism- (present on admission)  Assessment & Plan  Continue levothyroxine    Acute cystitis without hematuria- (present on admission)  Assessment & Plan  UA outside facility showed moderate bacteria started on ceftriaxone and metronidazole prior to transfer  Day team to follow-up with UA cultures from outside facility.  Repeat UA here ordered  Treated with IV abx    Urinary retention- (present on admission)  Assessment & Plan  Navarrete catheter placed prior to transfer from [place] with 2 L of urine immediately drained  She has been on  narcotics for recent fracture  Due to urinary retention as well as constipation there is concern for cauda equina syndrome or other neurologic abnormality following recent L1 burst fracture - RULED OUT          VTE prophylaxis: Lovenox    I have performed a physical exam and reviewed and updated ROS and Plan today (11/18/2024). In review of yesterday's note (11/17/2024), there are no changes except as documented above.

## 2024-11-19 NOTE — CARE PLAN
The patient is Stable - Low risk of patient condition declining or worsening    Shift Goals  Clinical Goals: q2 turning  Patient Goals: pain less than 7  Family Goals: em      Patient is not progressing towards the following goals:      Problem: Fall Risk  Goal: Patient will remain free from falls  Description: Target End Date:  Prior to discharge or change in level of care    Document interventions on the [facility]ixe[facility] Fall Risk Assessment    1.  Assess for fall risk factors  2.  Implement fall precautions  Outcome: Not Progressing

## 2024-11-19 NOTE — CARE PLAN
The patient is Stable - Low risk of patient condition declining or worsening    Shift Goals  Clinical Goals: safety  Patient Goals: know the current plan of care  Family Goals: em    Progress made toward(s) clinical / shift goals:    Problem: Pain - Standard  Goal: Alleviation of pain or a reduction in pain to the patient’s comfort goal  Outcome: Progressing     Problem: Fall Risk  Goal: Patient will remain free from falls  Outcome: Progressing     Pt has had no falls during this shift. Pt pain is managed. No questions or concerns at this time.

## 2024-11-20 VITALS
WEIGHT: 203.93 LBS | SYSTOLIC BLOOD PRESSURE: 114 MMHG | OXYGEN SATURATION: 93 % | BODY MASS INDEX: 30.2 KG/M2 | TEMPERATURE: 97.4 F | HEART RATE: 86 BPM | DIASTOLIC BLOOD PRESSURE: 71 MMHG | HEIGHT: 69 IN | RESPIRATION RATE: 17 BRPM

## 2024-11-20 LAB
GLUCOSE BLD STRIP.AUTO-MCNC: 159 MG/DL (ref 65–99)
GLUCOSE BLD STRIP.AUTO-MCNC: 172 MG/DL (ref 65–99)

## 2024-11-20 PROCEDURE — A9270 NON-COVERED ITEM OR SERVICE: HCPCS | Performed by: GENERAL PRACTICE

## 2024-11-20 PROCEDURE — A9270 NON-COVERED ITEM OR SERVICE: HCPCS | Performed by: STUDENT IN AN ORGANIZED HEALTH CARE EDUCATION/TRAINING PROGRAM

## 2024-11-20 PROCEDURE — A9270 NON-COVERED ITEM OR SERVICE: HCPCS

## 2024-11-20 PROCEDURE — 700102 HCHG RX REV CODE 250 W/ 637 OVERRIDE(OP): Performed by: GENERAL PRACTICE

## 2024-11-20 PROCEDURE — 700102 HCHG RX REV CODE 250 W/ 637 OVERRIDE(OP): Performed by: INTERNAL MEDICINE

## 2024-11-20 PROCEDURE — 700101 HCHG RX REV CODE 250: Performed by: INTERNAL MEDICINE

## 2024-11-20 PROCEDURE — 99239 HOSP IP/OBS DSCHRG MGMT >30: CPT | Performed by: STUDENT IN AN ORGANIZED HEALTH CARE EDUCATION/TRAINING PROGRAM

## 2024-11-20 PROCEDURE — A9270 NON-COVERED ITEM OR SERVICE: HCPCS | Performed by: INTERNAL MEDICINE

## 2024-11-20 PROCEDURE — 700102 HCHG RX REV CODE 250 W/ 637 OVERRIDE(OP)

## 2024-11-20 PROCEDURE — 700102 HCHG RX REV CODE 250 W/ 637 OVERRIDE(OP): Performed by: STUDENT IN AN ORGANIZED HEALTH CARE EDUCATION/TRAINING PROGRAM

## 2024-11-20 PROCEDURE — 82962 GLUCOSE BLOOD TEST: CPT | Mod: 91

## 2024-11-20 RX ORDER — METOPROLOL TARTRATE 50 MG
50 TABLET ORAL 2 TIMES DAILY
Qty: 60 TABLET | Refills: 0
Start: 2024-11-20

## 2024-11-20 RX ORDER — OXYCODONE HYDROCHLORIDE 5 MG/1
5 TABLET ORAL EVERY 6 HOURS PRN
Qty: 15 TABLET | Refills: 0 | Status: SHIPPED | OUTPATIENT
Start: 2024-11-20 | End: 2024-11-20

## 2024-11-20 RX ORDER — OXYCODONE HYDROCHLORIDE 5 MG/1
5 TABLET ORAL EVERY 6 HOURS PRN
Qty: 15 TABLET | Refills: 0 | Status: SHIPPED | OUTPATIENT
Start: 2024-11-20 | End: 2024-11-25

## 2024-11-20 RX ADMIN — POLYETHYLENE GLYCOL 3350 1 PACKET: 17 POWDER, FOR SOLUTION ORAL at 06:04

## 2024-11-20 RX ADMIN — LOSARTAN POTASSIUM 100 MG: 50 TABLET, FILM COATED ORAL at 06:04

## 2024-11-20 RX ADMIN — INSULIN GLARGINE-YFGN 10 UNITS: 100 INJECTION, SOLUTION SUBCUTANEOUS at 08:12

## 2024-11-20 RX ADMIN — OXYCODONE 5 MG: 5 TABLET ORAL at 00:21

## 2024-11-20 RX ADMIN — SENNOSIDES AND DOCUSATE SODIUM 2 TABLET: 50; 8.6 TABLET ORAL at 06:04

## 2024-11-20 RX ADMIN — METOPROLOL TARTRATE 50 MG: 50 TABLET, FILM COATED ORAL at 06:04

## 2024-11-20 RX ADMIN — LIDOCAINE 1 PATCH: 4 PATCH TOPICAL at 06:03

## 2024-11-20 RX ADMIN — APIXABAN 5 MG: 5 TABLET, FILM COATED ORAL at 06:04

## 2024-11-20 RX ADMIN — TAMSULOSIN HYDROCHLORIDE 0.4 MG: 0.4 CAPSULE ORAL at 11:59

## 2024-11-20 RX ADMIN — INSULIN LISPRO 3 UNITS: 100 INJECTION, SOLUTION INTRAVENOUS; SUBCUTANEOUS at 08:11

## 2024-11-20 RX ADMIN — INSULIN LISPRO 3 UNITS: 100 INJECTION, SOLUTION INTRAVENOUS; SUBCUTANEOUS at 12:34

## 2024-11-20 ASSESSMENT — PAIN DESCRIPTION - PAIN TYPE
TYPE: ACUTE PAIN
TYPE: ACUTE PAIN

## 2024-11-20 NOTE — DISCHARGE SUMMARY
"Discharge Summary    CHIEF COMPLAINT ON ADMISSION  Chief Complaint   Patient presents with    Back Pain     BIB from [facility], reports lower back pain since patient had a fall last 10/24. Patient has L1 compression fracture. Patient also states having abd distention and constipation for 1 week. Patient sodium is 119 and CT result showed cholecystitis, here for further work-up. On 02 at 3 LPM baseline, with garcia cath. Sats: 91%        Reason for Admission  ems    Admission Date  11/12/2024     CODE STATUS  DNAR/DNI    HPI & HOSPITAL COURSE  This is an 84-year-old female with past medical history of hypertension, dyslipidemia, type 2 diabetes, hypothyroidism, chronic hypoxemic respiratory failure on 2 to 3 L nocturnal, constipation and history of urinary retention who was admitted on 11/12/2024 with worsening back pain.    She was recently admitted at [facility] from 10/24 - 10/29 following a ground-level fall.  CT scan at the time showed an L1 burst fracture, she was discharged to rehab.    CT abdomen/pelvis with contrast was done which showed \"L1 burst fracture with 4 mm retropulsion... Diffuse gallbladder wall thickening with a dilated gallbladder measuring 5.4 cm in diameter and 0.1 cm in length.  Prominent adjacent inflammatory changes noted.  Findings are favored to relate to cholecystitis.  No evidence of gallbladder perforation or abscess.... Urinary bladder distention. \"  RUQ US -noted cholelithiasis, gallbladder sludge, thickened gallbladder, sonographic signs compatible with cholecystitis.  Patient started on Rocephin and Flagyl.   For cholecystitis, patient treated with antibiotics, and ultimately underwent laparoscopic cholecystectomy without complications.     Urinalysis significant for UTI, patient with significan urinary retention requiring Garcia catheter placement. She completed UTI treatment, she failed voiding trial and garcia catheter was replaced. She is to follow up with urology outpatient for urinary " retention management.    Lumbar MRI noted moderate canal narrowing at L4-L5 with moderate left lateral recess stenosis.  Superior endplate compression fracture at L1 with extensive bone marrow edema and a fluid filled vertebral body collapse subjacent to the superior endplate.  Approximately about 30% loss of height with minimal posterior superior cortical retropulsion without cauda equina compression. No need for surgical intervention for stable lumbar MRI findings.    Patients hospitalization also notable for new onset atrial fibrillation. She was started on metoprolol for rate control and eliquis for a fib stroke risk. She is tolerating medications well, she is to follow up with her PCP.    Patient feeling well, she continues to have chronic back pain from lumbar fracture which is managed with pain medications. PT/OT recommend post acute services. She is accepted to SNF.    Therefore, she is discharged in fair and stable condition to skilled nursing facility.    The patient met 2-midnight criteria for an inpatient stay at the time of discharge.      FOLLOW UP ITEMS POST DISCHARGE  Take medications as prescribed.  Follow up with PCP and urology.    DISCHARGE DIAGNOSES  Principal Problem:    Closed stable burst fracture of first lumbar vertebra (HCC) (POA: Yes)  Active Problems:    Urinary retention (POA: Yes)    Acute cystitis without hematuria (POA: Yes)    Acquired hypothyroidism (POA: Yes)    Type 2 diabetes mellitus with hyperglycemia, with long-term current use of insulin (HCC) (POA: Yes)    Primary hypertension (POA: Yes)    Dyslipidemia (POA: Yes)    Constipation (POA: Yes)    Hyponatremia (POA: Yes)    Generalized abdominal pain (POA: Clinically Undetermined)    Atrial fibrillation with RVR (HCC) (POA: Unknown)    Hypophosphatemia (POA: Unknown)  Resolved Problems:    * No resolved hospital problems. *      FOLLOW UP  No future appointments.  Gabriel Liu M.D.  [address] [facility]  [address]  [phone]    Follow up      [facility]  [id number]  [place] [id number]  [phone]    Follow up in 2 week(s)  For post operative follow up, For wound re-check      MEDICATIONS ON DISCHARGE     Medication List        START taking these medications        Instructions   apixaban 5mg Tabs  Commonly known as: Eliquis   Take 1 Tablet by mouth 2 times a day. Indications: Thromboembolism secondary to Atrial Fibrillation  Dose: 5 mg     metoprolol tartrate 50 MG Tabs  Commonly known as: Lopressor   Take 1 Tablet by mouth 2 times a day.  Dose: 50 mg     oxyCODONE immediate-release 5 MG Tabs  Commonly known as: Roxicodone   Take 1 Tablet by mouth every 6 hours as needed for Severe Pain for up to 5 days.  Dose: 5 mg            CHANGE how you take these medications        Instructions   insulin  UNIT/ML Susp  What changed: how much to take  Commonly known as: HumuLIN/NovoLIN N   Inject 10 Units under the skin every day.  Dose: 10 Units            CONTINUE taking these medications        Instructions   acetaminophen 325 MG Tabs  Commonly known as: Tylenol   Take 650 mg by mouth every 6 hours as needed for Mild Pain.  Dose: 650 mg     amLODIPine 10 MG Tabs  Commonly known as: Norvasc   Take 10 mg by mouth every day.  Dose: 10 mg     anastrozole 1 MG Tabs  Commonly known as: Arimidex   Take 1 mg by mouth every day.  Dose: 1 mg     furosemide 40 MG Tabs  Commonly known as: Lasix   Take 40 mg by mouth every day.  Dose: 40 mg     gabapentin 100 MG Caps  Commonly known as: Neurontin   Take 100 mg by mouth 4 times a day.  Dose: 100 mg     guaiFENesin  MG Tb12  Commonly known as: Mucinex   Take 600 mg by mouth 2 Times a Day.  Dose: 600 mg     lidocaine 4 % Ptch  Commonly known as: Asperflex   Place 1-3 Patches on the skin every 24 hours.  Dose: 1-3 Patch     losartan 100 MG Tabs  Commonly known as: Cozaar   Take 100 mg by mouth every day.  Dose: 100 mg     meloxicam 15 MG tablet  Commonly  known as: Mobic   Take 15 mg by mouth every day.  Dose: 15 mg     metformin 1000 MG tablet  Commonly known as: Glucophage   Take 1,000 mg by mouth 2 times daily, before breakfast and dinner.  Dose: 1,000 mg     omeprazole 20 MG delayed-release capsule  Commonly known as: PriLOSEC   Take 20 mg by mouth every day.  Dose: 20 mg     ondansetron 4 MG Tbdp  Commonly known as: Zofran ODT   Take 4 mg by mouth every 6 hours as needed for Nausea/Vomiting.  Dose: 4 mg     VESIcare 5 MG tablet  Generic drug: solifenacin   Take 5 mg by mouth every day.  Dose: 5 mg     Zocor 20 MG Tabs  Generic drug: simvastatin   Take 20 mg by mouth every evening.  Dose: 20 mg     zolpidem 12.5 MG CR tablet  Commonly known as: Ambien CR   Take 12.5 mg by mouth at bedtime as needed for Sleep.  Dose: 12.5 mg            STOP taking these medications      HYDROcodone-acetaminophen 5-325 MG Tabs per tablet  Commonly known as: Norco              Allergies  No Known Allergies    DIET  Orders Placed This Encounter   Procedures    Diet Order Diet: Consistent CHO (Diabetic)     Standing Status:   Standing     Number of Occurrences:   1     Order Specific Question:   Diet:     Answer:   Consistent CHO (Diabetic) [4]       ACTIVITY  As tolerated and directed by skilled nursing.  Weight bearing as tolerated    LINES, DRAINS, AND WOUNDS  This is an automated list. Peripheral IVs will be removed prior to discharge.  Peripheral IV 11/12/24 20 G Anterior;Right Forearm (Active)   Site Assessment Clean;Dry;Intact 11/20/24 0745   Dressing Type Transparent 11/20/24 0745   Line Status Saline locked 11/20/24 0745   Dressing Status Clean;Dry;Intact 11/20/24 0745   Dressing Intervention N/A 11/20/24 0745   Dressing Change Due 11/23/24 11/20/24 0745   Infiltration Grading ([facility]) 0 11/20/24 0745   Phlebitis Scale ([facility] Only) 0 11/20/24 0745     Urethral Catheter Non-latex 16 Fr. (Active)   Output (mL) 950 mL 11/20/24 0547       External Urinary Catheter (Female  Wick) (Active)   Collection Container Suction container 11/18/24 0100   Output (mL) 0 mL 11/18/24 0400      Moisture Associated Skin Damage 11/12/24 Sacrum;Buttock (Active)   Wound Image   11/16/24 0030   NEXT Weekly Photo (Inpatient Only) 11/19/24 11/15/24 0742   Drainage Amount None 11/19/24 2045   Periwound Assessment Pink;Red 11/19/24 2045   IAD Cleansing Foam Cleanser/Washcloth 11/18/24 2035   Periwound Protectant Barrier Paste 11/19/24 2045   IAD Containment Device Purewick 11/17/24 2206   WOUND NURSE ONLY - Time Spent with Patient (mins) 15 11/12/24 1600       Wound 11/15/24 Incision Abdomen dermabond (Active)   Wound Image   11/17/24 2100   Site Assessment Clean;Dry;Intact 11/19/24 2045   Periwound Assessment Intact;Dry;Clean 11/19/24 2045   Closure Dermabond 11/19/24 2045   Treatments Site care 11/19/24 2045   Dressing Status Open to Air 11/19/24 2045   Dressing Changed Changed 11/16/24 0800   Dressing Options Open to Air 11/19/24 2045       Peripheral IV 11/12/24 20 G Anterior;Right Forearm (Active)   Site Assessment Clean;Dry;Intact 11/20/24 0745   Dressing Type Transparent 11/20/24 0745   Line Status Saline locked 11/20/24 0745   Dressing Status Clean;Dry;Intact 11/20/24 0745   Dressing Intervention N/A 11/20/24 0745   Dressing Change Due 11/23/24 11/20/24 0745   Infiltration Grading ([facility]) 0 11/20/24 0745   Phlebitis Scale ([facility] Only) 0 11/20/24 0745           Urethral Catheter Non-latex 16 Fr. (Active)   Output (mL) 950 mL 11/20/24 0547        MENTAL STATUS ON TRANSFER         Alert, oriented x3    CONSULTATIONS  surgery    PROCEDURES  DATE OF OPERATION:                    11/15/2024     PREOPERATIVE DIAGNOSIS:         acute cholecystitis.     POSTOPERATIVE DIAGNOSIS:      gangrenous cholecystitis.     PROCEDURE PERFORMED:           Laparoscopic cholecystectomy     SURGEON:                             Wes Tena M.D.     ASSISTANT:                          Naty Taylor PA-C.       ANESTHESIOLOGIST:          Manoj Fay M.D.     ANESTHESIA:                       General endotracheal anesthesia.     INDICATIONS: The patient is a 84 year-old woman with clinical and radiographic findings of acute cholecystitis. She is taken to the operating room for a laparoscopic cholecystectomy.      The nature of the surgical procedure warranted additional skilled operative assistance from a licensed Physician Assistant (LISSY). The assistant was present during the entire operation. The surgical assistant performed the following: provided assistance with optimal surgical exposure of the operative field and provided high complexity, subspecialty decision making input.                 FINDINGS: Gangrenous cholecystitis necrotic tissue abscess     WOUND CLASSIFICATION:             Class Infection present at the time of surgery (PATOS).     SPECIMEN:                                        Gallbladder.     ESTIMATED BLOOD LOSS:             50 mL.     PROCEDURE: Following informed consent consent, the patient was properly identified, taken to the operating room and placed in supine position where general endotracheal anesthesia was administered. Intravenous antibiotics were administered by the anesthesiologist in correct time interval. The patient voided prior to surgery. A urinary catheter was not placed. Sequential compression devices were employed. The abdomen was prepped and draped into a sterile field. A timeout was conducted with the full attention and participation of all operating room personnel.     Marcaine 0.5% was used to infiltrate the port sites. An infraumbilical midline incision was made and the subcutaneous tissues spread bluntly. The fascia was elevated and incised.  Sparks port was placed.  Carbon dioxide pneumoperitoneum was instilled.  A 5 mm 30 degree lens and camera was passed into the peritoneal cavity. An 11 mm port was placed in the epigastric midline under direct vision. Two 5 mm  right subcostal ports were placed under direct vision.      Air in the soft tissuesThe gallbladder was identified and elevated. Dissection was carried out to completely expose and delineate the hepatocystic triangle. The critical view was achieved definitively identifying the single cystic duct and single cystic artery entering the gallbladder. These structures were multiply clipped proximally, once distally and divided. The gallbladder was dissected free from the undersurface of the liver using electrocautery and placed within a laparoscopic specimen retrieval bag. The gallbladder was delivered intact from the abdominal cavity and submitted for pathology.  The gallbladder fossa was inspected. Hemostasis was satisfactory.  Gallbladder was necrotic tissue was friable abscess present between the gallbladder and liver      The umbilical port site fascia was approximated using a trocar site closure device with a 0 VICRYL® Plus Antibacterial suture.  Epigastric port site was closed with interrupted 0 Vicryl incisions were irrigated skin was closed with Monocryl and Dermabond was placed.     The patient tolerated the procedure well, and there were no apparent complications. All sponge, needle, and instrument counts were correct on 2 separate occasions. The patient was awakened, extubated, and transferred to  to the post anesthesia care unit (PACU) in satisfactory condition.    LABORATORY  Lab Results   Component Value Date    SODIUM 132 (L) 11/19/2024    POTASSIUM 4.2 11/19/2024    CHLORIDE 97 11/19/2024    CO2 26 11/19/2024    GLUCOSE 181 (H) 11/19/2024    BUN 9 11/19/2024    CREATININE 0.63 11/19/2024    CREATININE 1.0 05/15/2009        Lab Results   Component Value Date    WBC 7.4 11/19/2024    HEMOGLOBIN 14.0 11/19/2024    HEMATOCRIT 42.1 11/19/2024    PLATELETCT 399 11/19/2024        Total time of the discharge process exceeds 34 minutes.

## 2024-11-20 NOTE — PROGRESS NOTES
Bladder scan at 17:00 revealed 898 mL.  Attempted to place garcia catheter, but was failed attempt. This was only garcia on S5 and S6 floors.  Used straight cath on patient instead to get 700 mL urine from patient.  Ordered 2 garcia catheters for future use.

## 2024-11-20 NOTE — DISCHARGE PLANNING
DC Transport Scheduled    Transport Company Scheduled:  [employer]  Spoke with Yoko at [facility] to schedule transport.      Scheduled Date: 11/20/2024  Scheduled Time: 1400    Transport Type: [employer]  Destination: [facility]   Destination address: [address] [place] [id number]        Notified care team of scheduled transport via [employer].     If there are any changes needed to the DC transportation scheduled, please contact [facility] at ext. [phone] between the hours of 2678-8613 Mon-Fri. If outside those hours, contact the ED Case Manager at ext. [phone].

## 2024-11-20 NOTE — CARE PLAN
The patient is Stable - Low risk of patient condition declining or worsening    Shift Goals  Clinical Goals: q2 turns  Patient Goals: pain less than 7  Family Goals: em    Progress made toward(s) clinical / shift goals:      Problem: Pain - Standard  Goal: Alleviation of pain or a reduction in pain to the patient’s comfort goal  Outcome: Progressing     Pt pain is controlled during shift. Pt able to rest without pain. No questions at this time about pain.   Patient is not progressing towards the following goals:      Problem: Urinary Elimination:  Goal: Ability to reestablish a normal urinary elimination pattern will improve  Outcome: Not Progressing   Pt still retaining fluids. Bladder scan was greater than 450mL. Pt now has agrcia catheter in place.

## 2024-11-20 NOTE — DISCHARGE PLANNING
Case Management Discharge Planning    Admission Date: 11/12/2024  GMLOS: 3.3  ALOS: 8    6-Clicks ADL Score: 12  6-Clicks Mobility Score: 11  PT and/or OT Eval ordered: Yes  Post-acute Referrals Ordered: Yes  Post-acute Choice Obtained: Yes  Has referral(s) been sent to post-acute provider:  Yes      Anticipated Discharge Dispo: Discharge Disposition: D/T to SNF with Medicare cert in anticipation of skilled care (03)    DME Needed: No    Action(s) Taken: Chart review completed. Patient discussed in am rounds. Per chart review, patient is [facility] and has been accepted at [facility]; requesting transport for today at 1400    RNCM submitted transport request via [employer] order; [facility] requesting ABN through [facility]     ABN form faxed to [facility] at: [phone] for signature from their director    COBRA packet initiated and in CM office; RNCM updated patient at bedside and obtained signature for IMM/COBRA form    1125: Signed ABN faxed to [facility] to complete transport request    1145: Completed COBRA packet hand delivered to bedside RN

## 2024-11-20 NOTE — PROGRESS NOTES
RN made multiple attempts to contact [facility] to give report without success.    Charge RN and  Notified.

## 2024-11-20 NOTE — CARE PLAN
Pt AO x 4, forgetful at times.  Pt denied pain during initial assessment.  Call light and belongings within reach.  Bed locked and in lowest position.  Hourly rounding.  Needs currently met.           The patient is Stable - Low risk of patient condition declining or worsening    Shift Goals  Clinical Goals: maintain skin integrity; q2 turns  Patient Goals: rest for now  Family Goals: VICTORIA    Progress made toward(s) clinical / shift goals:    Pt was able to tolerate all q2 turns during shift. Pt discharged successfully to [facility] via [facility].       Problem: Knowledge Deficit - Standard  Goal: Patient and family/care givers will demonstrate understanding of plan of care, disease process/condition, diagnostic tests and medications  Outcome: Progressing     Problem: Fall Risk  Goal: Patient will remain free from falls  Outcome: Progressing

## 2025-05-06 ENCOUNTER — TELEPHONE (OUTPATIENT)
Dept: HEALTH INFORMATION MANAGEMENT | Facility: OTHER | Age: 85
End: 2025-05-06
Payer: MEDICARE

## 2025-06-19 ENCOUNTER — APPOINTMENT (OUTPATIENT)
Dept: NEUROLOGY | Facility: MEDICAL CENTER | Age: 85
End: 2025-06-19
Attending: STUDENT IN AN ORGANIZED HEALTH CARE EDUCATION/TRAINING PROGRAM
Payer: MEDICARE

## (undated) DEVICE — DRAPE LARGE 3 QUARTER - (20/CA)

## (undated) DEVICE — SPONGE GAUZESTER 4 X 4 4PLY - (128PK/CA)

## (undated) DEVICE — SENSOR SPO2 NEO LNCS ADHESIVE (20/BX) SEE USER NOTES

## (undated) DEVICE — SENSOR OXIMETER ADULT SPO2 RD SET (20EA/BX)

## (undated) DEVICE — LENS/HOOD FOR SPACESUIT - (32/PK) PEEL AWAY FACE

## (undated) DEVICE — GOWN WARMING STANDARD FLEX - (30/CA)

## (undated) DEVICE — GLOVE BIOGEL INDICATOR SZ 8.5 SURGICAL PF LTX - (50/BX 4BX/CA)

## (undated) DEVICE — BLADE 90X12.5X1.37MM SAW SAGITTAL

## (undated) DEVICE — ENDO PEANUT 5MM DEVICE (12EA/BX)

## (undated) DEVICE — BLADE SAGITTAL SAW 9.4MM X 25.5MM X .4MM FINE TOOTH (1/EA)

## (undated) DEVICE — TUBING CLEARLINK DUO-VENT - C-FLO (48EA/CA)

## (undated) DEVICE — KIT ANESTHESIA W/CIRCUIT & 3/LT BAG W/FILTER (20EA/CA)

## (undated) DEVICE — SUTURE GENERAL

## (undated) DEVICE — CHLORAPREP 26 ML APPLICATOR - ORANGE TINT(25/CA)

## (undated) DEVICE — SYRINGE SAFETY 3 ML 18 GA X 1 1/2 BLUNT LL (100/BX 8BX/CA)

## (undated) DEVICE — CANISTER SUCTION 3000ML MECHANICAL FILTER AUTO SHUTOFF MEDI-VAC NONSTERILE LF DISP (40EA/CA)

## (undated) DEVICE — BLADE RECIPROCATING 12.7 X 78.7 X 1.0MM (1/EA)

## (undated) DEVICE — HANDPIECE 10FT INTPLS SCT PLS IRRIGATION HAND CONTROL SET (6/PK)

## (undated) DEVICE — CANNULA W/SEAL 5X100 Z-THRE - ADED KII (12/BX)

## (undated) DEVICE — SUTURE 0 VICRYL PLUS UR-6 - 27 INCH (36/BX)

## (undated) DEVICE — SET EXTENSION WITH 2 PORTS (48EA/CA) ***PART #2C8610 IS A SUBSTITUTE*****

## (undated) DEVICE — SLEEVE, VASO, THIGH, MED

## (undated) DEVICE — GLOVE BIOGEL SZ 8.5 SURGICAL PF LTX - (50PR/BX 4BX/CA)

## (undated) DEVICE — BAG RETRIEVAL 10ML (10EA/BX)

## (undated) DEVICE — PAD PREP 24 X 48 CUFFED - (100/CA)

## (undated) DEVICE — SUTURE 4-0 MONOCRYL PLUS PS-2 - 27 INCH (36/BX)

## (undated) DEVICE — KIT ROOM DECONTAMINATION

## (undated) DEVICE — GLOVE BIOGEL SZ 8 SURGICAL PF LTX - (50PR/BX 4BX/CA)

## (undated) DEVICE — STOCKINET TUBULAR 6IN STERILE - 6 X 48YDS (25/CA)

## (undated) DEVICE — CANISTER SUCTION 3000ML MECHANICAL FILTER AUTO SHUTOFF MEDI-VAC NONSTERILE LF DISP  (40EA/CA)

## (undated) DEVICE — TROCAR Z THREAD11MM OPTICAL - NON BLADED(6/BX)

## (undated) DEVICE — SUCTION INSTRUMENT YANKAUER BULBOUS TIP W/O VENT (50EA/CA)

## (undated) DEVICE — SODIUM CHL IRRIGATION 0.9% 1000ML (12EA/CA)

## (undated) DEVICE — TROCAR 5X100 NON BLADED Z-TH - READ KII (6/BX)

## (undated) DEVICE — GOWN SURGEONS X-LARGE - DISP. (30/CA)

## (undated) DEVICE — BLADE SAGITTAL SAW DUAL CUT 25.0 X 90.0 X 1.27MM (1/EA)

## (undated) DEVICE — ELECTRODE 850 FOAM ADHESIVE - HYDROGEL RADIOTRNSPRNT (50/PK)

## (undated) DEVICE — LACTATED RINGERS INJ 1000 ML - (14EA/CA 60CA/PF)

## (undated) DEVICE — LEAD SET 6 DISP. EKG NIHON KOHDEN

## (undated) DEVICE — CLIP MED LG INTNL HRZN TI ESCP - (20/BX)

## (undated) DEVICE — SET SUCTION/IRRIGATION WITH DISPOSABLE TIP (6/CA )PART #0250-070-520 IS A SUB

## (undated) DEVICE — PACK LAP CHOLE OR - (2EA/CA)

## (undated) DEVICE — TIP INTPLS HFLO ML ORFC BTRY - (12/CS)  FOR SURGILAV

## (undated) DEVICE — SET LEADWIRE 5 LEAD BEDSIDE DISPOSABLE ECG (1SET OF 5/EA)

## (undated) DEVICE — DERMABOND ADVANCED - (12EA/BX)

## (undated) DEVICE — MASK ANESTHESIA ADULT  - (100/CA)

## (undated) DEVICE — MIXER BONE CEMENT REVOLUTION - W/FEMORAL PRESSURIZER (6/CA)

## (undated) DEVICE — HEAD HOLDER JUNIOR/ADULT

## (undated) DEVICE — TOURNIQUET CUFF 34 X 4 ONE PORT DISP - STERILE (10/BX)

## (undated) DEVICE — PROTECTOR ULNA NERVE - (36PR/CA)

## (undated) DEVICE — VESSEL DIVIDER SEAL LAP LIGASURE 37CM (6EA/BX)

## (undated) DEVICE — SUTURE 1 VICRYL PLUS CTX - 8 X 18 INCH (12/BX)

## (undated) DEVICE — DRAPE U ORTHOPEDIC - (10/BX)

## (undated) DEVICE — NEPTUNE 4 PORT MANIFOLD - (20/PK)

## (undated) DEVICE — BANDAGE ELASTIC 6 HONEYCOMB - 6X5YD LF (20/CA)"

## (undated) DEVICE — SUTURE 3-0 MONOCRYL PLUS PS-1 - 27 INCH (36/BX)

## (undated) DEVICE — GLOVE BIOGEL INDICATOR SZ 8 SURGICAL PF LTX - (50/BX 4BX/CA)

## (undated) DEVICE — BLOCK

## (undated) DEVICE — Device

## (undated) DEVICE — ELECTRODE DUAL RETURN W/ CORD - (50/PK)

## (undated) DEVICE — BLADE SAGITTAL DUAL 25MM

## (undated) DEVICE — TROCAR LAPSCP 100MM 12MM NTHRD - (6/BX)

## (undated) DEVICE — MASK, LARYNGEAL AIRWAY #4

## (undated) DEVICE — PACK TOTAL KNEE  (1/CA)

## (undated) DEVICE — SUTURE 2-0 MONOCRYL PLUS UNDYED CT-1 1 X 36 (36EA/BX)"

## (undated) DEVICE — COVER LIGHT HANDLE ALC PLUS DISP (18EA/BX)